# Patient Record
Sex: MALE | Race: WHITE | Employment: OTHER | URBAN - METROPOLITAN AREA
[De-identification: names, ages, dates, MRNs, and addresses within clinical notes are randomized per-mention and may not be internally consistent; named-entity substitution may affect disease eponyms.]

---

## 2017-10-13 PROBLEM — K92.2 GI BLEED: Chronic | Status: ACTIVE | Noted: 2017-10-13

## 2017-10-13 PROBLEM — Z79.01 CHRONIC ANTICOAGULATION: Status: ACTIVE | Noted: 2017-10-13

## 2017-10-16 PROBLEM — K25.0 ACUTE GASTRIC ULCER WITH HEMORRHAGE: Status: ACTIVE | Noted: 2017-10-16

## 2017-11-01 ENCOUNTER — HOSPITAL ENCOUNTER (OUTPATIENT)
Dept: SURGERY | Age: 72
Discharge: HOME | End: 2017-11-01
Attending: SPECIALIST | Admitting: SPECIALIST

## 2017-11-01 VITALS
HEART RATE: 71 BPM | DIASTOLIC BLOOD PRESSURE: 54 MMHG | WEIGHT: 159 LBS | TEMPERATURE: 97.1 F | HEIGHT: 70 IN | RESPIRATION RATE: 16 BRPM | SYSTOLIC BLOOD PRESSURE: 106 MMHG | OXYGEN SATURATION: 95 % | BODY MASS INDEX: 22.76 KG/M2

## 2017-11-01 RX ORDER — SODIUM CHLORIDE, SODIUM LACTATE, POTASSIUM CHLORIDE, CALCIUM CHLORIDE 600; 310; 30; 20 MG/100ML; MG/100ML; MG/100ML; MG/100ML
INJECTION, SOLUTION INTRAVENOUS CONTINUOUS
Status: DISCONTINUED | OUTPATIENT
Start: 2017-11-01 | End: 2017-11-02 | Stop reason: HOSPADM

## 2017-11-01 RX ADMIN — SODIUM CHLORIDE, SODIUM LACTATE, POTASSIUM CHLORIDE, CALCIUM CHLORIDE: 600; 310; 30; 20 INJECTION, SOLUTION INTRAVENOUS at 11:59

## 2017-11-01 ASSESSMENT — PAIN SCALES - GENERAL
PAINLEVEL_OUTOF10: 0
PAINLEVEL_OUTOF10: 0

## 2017-11-01 ASSESSMENT — PAIN - FUNCTIONAL ASSESSMENT: PAIN_FUNCTIONAL_ASSESSMENT: 0-10

## 2017-11-01 NOTE — PROGRESS NOTES
1326 denies pain or nausea. Head of bed up. Dr Ciarra Soriano provided update at bedside, call light in reach  1332 juice provided  [phone] denies needs  [phone] discharge instructions reviewed.  Verbalized understanding  1350 ambulated to bathroom   discharged to car

## 2017-11-01 NOTE — BRIEF OP NOTE
BRIEF EGD REPORT:    Impression:    1) no fresh or old blood and no stigmata of recent bleeding   2) small hiatal hernia with possible short-segment Zimmer's- biopsied   3) otherwise normal          Suggest:   1) continue current med        The complete operative report (including photos) is available in the following locations:   1) soft chart now   2) report will also be scanned and can then be found by going to \"chart review\" then \"notes\" then \"op report\" - \"scanning HPF\" or by going to \"chart review\" then \"media\" then \"op report\". For review of photos, may need to go to page 2.

## 2017-11-01 NOTE — ANESTHESIA PRE-OP
Preanesthesia Chart Xotwmp77/01/17  11:51 AM    Name:  Nikita Pedersen  MRN:  [id number]  :  1945   Age:  67 y.o. Planned procedure: -EGD      Surgeon:   Beth Saravia     Past Medical History:   Diagnosis Date    CAD (coronary artery disease)     History of blood clots     Hyperlipidemia     Hypertension     PAD (peripheral artery disease) (HCC)        Past Surgical History:   Procedure Laterality Date    APPENDECTOMY      ENDOSCOPY, COLON, DIAGNOSTIC      2 weeks ago    FEMORAL BYPASS Bilateral     aortal-femoral bypass surgery     LAPAROSCOPIC APPENDECTOMY  10/26/2016    and primary closure umbilical hernia        Social History     Social History    Marital status:      Spouse name: N/A    Number of children: N/A    Years of education: N/A     Occupational History    Not on file. Social History Main Topics    Smoking status: Current Every Day Smoker     Types: Cigars, Pipe    Smokeless tobacco: Never Used    Alcohol use Yes      Comment: occasionally     Drug use: No    Sexual activity: Not on file     Other Topics Concern    Not on file     Social History Narrative    No narrative on file        Present on Admission:  **None**       Allergies   Allergen Reactions    Azithromycin Diarrhea and Nausea And Vomiting     Diarrhea and coffee ground vomiting. Not in a hospital admission.       lactated ringers infusion Continuous     Most Recent Results:  /64   Pulse 71   Temp 97.1 °F (36.2 °C) (Temporal)   Resp 16   Ht 5' 10\" (1.778 m)   Wt 159 lb (72.1 kg)   SpO2 98%   BMI 22.81 kg/m²      Lab Results   Component Value Date/Time    WBC 8.1 10/31/2017 02:15 PM    HGB 11.8 (L) 10/31/2017 02:15 PM    HCT 36.5 (L) 10/31/2017 02:15 PM     (H) 10/31/2017 02:15 PM     10/31/2017 02:15 PM    K 4.6 10/31/2017 02:15 PM     10/31/2017 02:15 PM    CO2 27 10/31/2017 02:15 PM    BUN 22 10/31/2017 02:15 PM    CREATININE 1.4 (H) 10/31/2017 02:15 PM GLUCOSE 57 (L) 10/31/2017 02:15 PM    PROTIME 20.6 (H) 10/31/2017 02:15 PM    INR 1.78 10/31/2017 02:15 PM    APTT 29.1 10/31/2017 02:15 PM     Anesthesiology focused physical examination:  MP2  RRR without murmur  Lungs clear    Assessment/Plan:  NPO status confirmed  Not on beta-blockers  Plan MAC/TIVA    PS 3    Anesthesia procedures discussed - all questions answered.   Information/plan discussed with CRNA involved in the anesthesia care team.    Archie Jamil MD

## 2017-11-01 NOTE — ANESTHESIA POST-OP
Anesthesia Post-op Note    Patient: Catracho Ryan  MRN: [id number]  YOB: 1945  Date of evaluation: 11/1/2017  Time:  1:24 PM     Procedure(s) Performed: EGD with Bx    Last Vitals: /64   Pulse 71   Temp 97.1 °F (36.2 °C) (Temporal)   Resp 16   Ht 5' 10\" (1.778 m)   Wt 159 lb (72.1 kg)   SpO2 98%   BMI 22.81 kg/m²     Kami Phase I: Kami Score: 10    Kami Phase II:      Anesthesia Post Evaluation    Final anesthesia type: general  Patient location during evaluation: procedure area  Patient participation: complete - patient participated  Level of consciousness: awake and alert  Airway patency: patent  Nausea & Vomiting: no nausea and no vomiting  Complications: no  Cardiovascular status: hemodynamically stable  Respiratory status: acceptable  Hydration status: euvolemic        Gordon Padilla CRNA  1:24 PM

## 2018-10-17 ENCOUNTER — ANESTHESIA EVENT (OUTPATIENT)
Dept: OPERATING ROOM | Age: 73
End: 2018-10-17
Payer: MEDICARE

## 2018-10-17 ASSESSMENT — LIFESTYLE VARIABLES: SMOKING_STATUS: 1

## 2018-10-17 NOTE — ANESTHESIA PRE PROCEDURE
Units by mouth daily      magnesium oxide (MAG-OX) 400 MG tablet Take 400 mg by mouth daily      Multiple Vitamins-Minerals (CENTRUM SILVER PO) Take 1 tablet by mouth daily         Allergies: Allergies   Allergen Reactions    Azithromycin Diarrhea and Nausea And Vomiting     Diarrhea and coffee ground vomiting.         Problem List:    Patient Active Problem List   Diagnosis Code    Acute appendicitis with localized peritonitis K35.30    GI bleed K92.2    Chronic anticoagulation Z79.01    Acute gastric ulcer with hemorrhage K25.0       Past Medical History:        Diagnosis Date    CAD (coronary artery disease)     History of blood clots 1995    Hyperlipidemia     Hypertension     PAD (peripheral artery disease) ([facility] [address]        Past Surgical History:        Procedure Laterality Date    APPENDECTOMY      ENDOSCOPY, COLON, DIAGNOSTIC      2 weeks ago    ENDOSCOPY, COLON, DIAGNOSTIC  11/01/2017    egd w/ biopsy, healing duodenal ulcers w/o recurrent bleeding possible barretts, small hiatal hernia    FEMORAL BYPASS Bilateral 1995    aortal-femoral bypass surgery     LAPAROSCOPIC APPENDECTOMY  10/26/2016    and primary closure umbilical hernia       Social History:    Social History   Substance Use Topics    Smoking status: Current Every Day Smoker     Types: Cigars, Pipe    Smokeless tobacco: Never Used    Alcohol use Yes      Comment: occasionally                                 Ready to quit: Not Answered  Counseling given: Not Answered      Vital Signs (Current):   Vitals:    10/16/18 1650   Weight: 170 lb (77.1 kg)   Height: 5' 10\" (1.778 m)                                              BP Readings from Last 3 Encounters:   11/01/17 (!) 106/54   10/31/17 112/61   10/16/17 (!) 183/65       NPO Status:                                                                                 BMI:   Wt Readings from Last 3 Encounters:   11/01/17 159 lb (72.1 kg)   10/31/17 150 lb (68 kg)   10/16/17 172 lb 11.2 oz (78.3 kg)     Body mass index is 24.39 kg/m². CBC:   Lab Results   Component Value Date    WBC 8.1 10/31/2017    RBC 3.55 10/31/2017    HGB 11.8 10/31/2017    HCT 36.5 10/31/2017    .8 10/31/2017    RDW 12.9 10/31/2017     10/31/2017       CMP:   Lab Results   Component Value Date     10/31/2017    K 4.6 10/31/2017     10/31/2017    CO2 27 10/31/2017    BUN 22 10/31/2017    CREATININE 1.4 10/31/2017    GFRAA >60 10/31/2017    LABGLOM 50 10/31/2017    GLUCOSE 57 10/31/2017    PROT 7.2 10/31/2017    CALCIUM 9.5 10/31/2017    BILITOT 0.2 10/31/2017    ALKPHOS 58 10/31/2017    AST 14 10/31/2017    ALT 8 10/31/2017       POC Tests: No results for input(s): POCGLU, POCNA, POCK, POCCL, POCBUN, POCHEMO, POCHCT in the last 72 hours. Coags:   Lab Results   Component Value Date    PROTIME 20.6 10/31/2017    INR 1.78 10/31/2017    APTT 29.1 10/31/2017       HCG (If Applicable): No results found for: PREGTESTUR, PREGSERUM, HCG, HCGQUANT     ABGs: No results found for: PHART, PO2ART, UJP8EPI, OOY1IZZ, BEART, Q9YIXCNQ     Type & Screen (If Applicable):  No results found for: LABABO, [address]    Anesthesia Evaluation  Patient summary reviewed  Airway: Mallampati: II     Neck ROM: full   Dental:    (+) upper dentures and lower dentures      Pulmonary:normal exam    (+) current smoker          Patient did not smoke on day of surgery. Cardiovascular:    (+) hypertension:, CAD:, hyperlipidemia                  Neuro/Psych:               GI/Hepatic/Renal:   (+) PUD,           Endo/Other:    (+) blood dyscrasia: anticoagulation therapy:., .                 Abdominal:           Vascular:   + PVD, aortic or cerebral, . Anesthesia Plan      MAC     ASA 3     ( Pre Anesthesia Assessment complete. Chart reviewed on 10/17/2018)  Induction: intravenous. Anesthetic plan and risks discussed with patient. Plan discussed with CRNA.     Attending anesthesiologist reviewed and agrees with Pre Eval content            GERRI Schaffer - CRNA   10/17/2018

## 2018-10-18 ENCOUNTER — ANESTHESIA (OUTPATIENT)
Dept: OPERATING ROOM | Age: 73
End: 2018-10-18
Payer: MEDICARE

## 2018-10-18 ENCOUNTER — HOSPITAL ENCOUNTER (OUTPATIENT)
Age: 73
Setting detail: OUTPATIENT SURGERY
Discharge: HOME | End: 2018-10-18
Attending: SPECIALIST | Admitting: SPECIALIST
Payer: MEDICARE

## 2018-10-18 VITALS
RESPIRATION RATE: 16 BRPM | SYSTOLIC BLOOD PRESSURE: 123 MMHG | BODY MASS INDEX: 24.94 KG/M2 | DIASTOLIC BLOOD PRESSURE: 72 MMHG | HEIGHT: 70 IN | OXYGEN SATURATION: 94 % | HEART RATE: 72 BPM | WEIGHT: 174.2 LBS | TEMPERATURE: 97.3 F

## 2018-10-18 VITALS — SYSTOLIC BLOOD PRESSURE: 107 MMHG | DIASTOLIC BLOOD PRESSURE: 59 MMHG | OXYGEN SATURATION: 100 %

## 2018-10-18 PROCEDURE — 2709999900 HC NON-CHARGEABLE SUPPLY: Performed by: SPECIALIST

## 2018-10-18 PROCEDURE — 7100000010 HC PHASE II RECOVERY - FIRST 15 MIN: Performed by: SPECIALIST

## 2018-10-18 PROCEDURE — 3700000000 HC ANESTHESIA ATTENDED CARE: Performed by: SPECIALIST

## 2018-10-18 PROCEDURE — 6360000002 HC RX W HCPCS: Performed by: NURSE ANESTHETIST, CERTIFIED REGISTERED

## 2018-10-18 PROCEDURE — 2580000003 HC RX 258: Performed by: SPECIALIST

## 2018-10-18 PROCEDURE — 88305 TISSUE EXAM BY PATHOLOGIST: CPT

## 2018-10-18 PROCEDURE — 3700000001 HC ADD 15 MINUTES (ANESTHESIA): Performed by: SPECIALIST

## 2018-10-18 PROCEDURE — 2500000003 HC RX 250 WO HCPCS: Performed by: NURSE ANESTHETIST, CERTIFIED REGISTERED

## 2018-10-18 PROCEDURE — 7100000011 HC PHASE II RECOVERY - ADDTL 15 MIN: Performed by: SPECIALIST

## 2018-10-18 PROCEDURE — 3609010300 HC COLONOSCOPY W/BIOPSY SINGLE/MULTIPLE: Performed by: SPECIALIST

## 2018-10-18 RX ORDER — SODIUM CHLORIDE, SODIUM LACTATE, POTASSIUM CHLORIDE, CALCIUM CHLORIDE 600; 310; 30; 20 MG/100ML; MG/100ML; MG/100ML; MG/100ML
INJECTION, SOLUTION INTRAVENOUS CONTINUOUS
Status: DISCONTINUED | OUTPATIENT
Start: 2018-10-18 | End: 2018-10-18 | Stop reason: HOSPADM

## 2018-10-18 RX ORDER — PROPOFOL 10 MG/ML
INJECTION, EMULSION INTRAVENOUS PRN
Status: DISCONTINUED | OUTPATIENT
Start: 2018-10-18 | End: 2018-10-18 | Stop reason: SDUPTHER

## 2018-10-18 RX ORDER — LIDOCAINE HYDROCHLORIDE 20 MG/ML
INJECTION, SOLUTION EPIDURAL; INFILTRATION; INTRACAUDAL; PERINEURAL PRN
Status: DISCONTINUED | OUTPATIENT
Start: 2018-10-18 | End: 2018-10-18 | Stop reason: SDUPTHER

## 2018-10-18 RX ADMIN — SODIUM CHLORIDE, POTASSIUM CHLORIDE, SODIUM LACTATE AND CALCIUM CHLORIDE: 600; 310; 30; 20 INJECTION, SOLUTION INTRAVENOUS at 10:06

## 2018-10-18 RX ADMIN — LIDOCAINE HYDROCHLORIDE 100 MG: 20 INJECTION, SOLUTION EPIDURAL; INFILTRATION; INTRACAUDAL; PERINEURAL at 11:27

## 2018-10-18 RX ADMIN — PROPOFOL 60 MG: 10 INJECTION, EMULSION INTRAVENOUS at 11:31

## 2018-10-18 RX ADMIN — PROPOFOL 60 MG: 10 INJECTION, EMULSION INTRAVENOUS at 11:33

## 2018-10-18 RX ADMIN — PROPOFOL 30 MG: 10 INJECTION, EMULSION INTRAVENOUS at 11:40

## 2018-10-18 RX ADMIN — PROPOFOL 70 MG: 10 INJECTION, EMULSION INTRAVENOUS at 11:27

## 2018-10-18 ASSESSMENT — PAIN - FUNCTIONAL ASSESSMENT: PAIN_FUNCTIONAL_ASSESSMENT: 0-10

## 2018-10-18 ASSESSMENT — PAIN SCALES - GENERAL
PAINLEVEL_OUTOF10: 0

## 2018-10-18 NOTE — ANESTHESIA POSTPROCEDURE EVALUATION
Department of Anesthesiology  Postprocedure Note    Patient: Paolo Moore  MRN: [id number]  YOB: 1945  Date of evaluation: 10/18/2018  Time:  12:05 PM     Procedure Summary     Date:  10/18/18 Room / Location:  [address] [facility] OR    Anesthesia Start:  0954 Anesthesia Stop:  8931    Procedure:  COLONOSCOPY WITH BIOPSY (N/A ) Diagnosis:  (HX Polyps)    Surgeon:  Fatuma Lundy MD Responsible Provider:  Andi Montano MD    Anesthesia Type:  MAC ASA Status:  3          Anesthesia Type: MAC    Kami Phase I: Kami Score: 10    Kami Phase II:      Last vitals: Reviewed and per EMR flowsheets.        Anesthesia Post Evaluation    Patient location during evaluation: PACU  Patient participation: complete - patient participated  Level of consciousness: awake  Airway patency: patent  Nausea & Vomiting: no vomiting and no nausea  Complications: no  Cardiovascular status: blood pressure returned to baseline  Respiratory status: acceptable  Hydration status: euvolemic

## 2018-10-18 NOTE — ANESTHESIA POSTPROCEDURE EVALUATION
Department of Anesthesiology  Postprocedure Note    Patient: Livia Grady  MRN: [id number]  YOB: 1945  Date of evaluation: 10/18/2018  Time:  11:43 AM     Procedure Summary     Date:  10/18/18 Room / Location:  [place] [address] / [facility]    Anesthesia Start:  1126 Anesthesia Stop:      Procedure:  COLONOSCOPY WITH BIOPSY (N/A ) Diagnosis:  (HX Polyps)    Surgeon:  Preston Yanes MD Responsible Provider:  Bita Sevilla MD    Anesthesia Type:  MAC ASA Status:  3          Anesthesia Type: MAC    Kami Phase I: Kami Score: 10    Kami Phase II:      Last vitals: Reviewed and per EMR flowsheets.        Anesthesia Post Evaluation    Patient location during evaluation: bedside  Patient participation: complete - patient participated  Level of consciousness: awake  Pain score: 0  Airway patency: patent  Nausea & Vomiting: no nausea and no vomiting  Complications: no  Cardiovascular status: blood pressure returned to baseline  Respiratory status: spontaneous ventilation and acceptable  Hydration status: euvolemic

## 2018-10-29 ENCOUNTER — TELEPHONE (OUTPATIENT)
Dept: CARDIOLOGY CLINIC | Age: 73
End: 2018-10-29

## 2018-10-29 ENCOUNTER — INITIAL CONSULT (OUTPATIENT)
Dept: CARDIOLOGY CLINIC | Age: 73
End: 2018-10-29
Payer: MEDICARE

## 2018-10-29 VITALS
DIASTOLIC BLOOD PRESSURE: 60 MMHG | HEIGHT: 70 IN | WEIGHT: 178 LBS | SYSTOLIC BLOOD PRESSURE: 94 MMHG | BODY MASS INDEX: 25.48 KG/M2 | HEART RATE: 60 BPM

## 2018-10-29 DIAGNOSIS — Z76.89 ENCOUNTER TO ESTABLISH CARE WITH NEW DOCTOR: Primary | ICD-10-CM

## 2018-10-29 PROCEDURE — 93000 ELECTROCARDIOGRAM COMPLETE: CPT | Performed by: INTERNAL MEDICINE

## 2018-10-29 PROCEDURE — 1101F PT FALLS ASSESS-DOCD LE1/YR: CPT | Performed by: INTERNAL MEDICINE

## 2018-10-29 PROCEDURE — G8419 CALC BMI OUT NRM PARAM NOF/U: HCPCS | Performed by: INTERNAL MEDICINE

## 2018-10-29 PROCEDURE — 99203 OFFICE O/P NEW LOW 30 MIN: CPT | Performed by: INTERNAL MEDICINE

## 2018-10-29 PROCEDURE — 3017F COLORECTAL CA SCREEN DOC REV: CPT | Performed by: INTERNAL MEDICINE

## 2018-10-29 PROCEDURE — G8484 FLU IMMUNIZE NO ADMIN: HCPCS | Performed by: INTERNAL MEDICINE

## 2018-10-29 PROCEDURE — G8427 DOCREV CUR MEDS BY ELIG CLIN: HCPCS | Performed by: INTERNAL MEDICINE

## 2018-10-29 RX ORDER — INDOMETHACIN 25 MG/1
25 CAPSULE ORAL DAILY PRN
COMMUNITY
End: 2019-11-05

## 2018-10-29 RX ORDER — CHOLECALCIFEROL (VITAMIN D3) 125 MCG
1 CAPSULE ORAL EVERY MORNING
COMMUNITY

## 2018-10-29 NOTE — PROGRESS NOTES
constipation, diarrhea or heartburn  · Genitourinary: No dysuria, trouble voiding, or hematuria  · Musculoskeletal:  No gait disturbance, weakness or joint complaints  · Integumentary: No rash or pruritis  · Neurological: No TIA or stroke symptoms  · Psychiatric: No anxiety or depression  · Endocrine: No malaise, fatigue or temperature intolerance  · Hematologic/Lymphatic: No bleeding problems, blood clots or swollen lymph nodes  · Allergic/Immunologic: No nasal congestion or hives  All systems negative except as marked. ·   ·      Physical Examination:    Vitals:    10/29/18 1058   BP: 94/60   Pulse: 60    rr 14  afebrile  Wt Readings from Last 3 Encounters:   10/29/18 178 lb (80.7 kg)   10/18/18 174 lb 3.2 oz (79 kg)   11/01/17 159 lb (72.1 kg)     Body mass index is 25.54 kg/m². General Appearance:  No distress, conversant    Constitutional:  Well developed, Well nourished, No acute distress, Non-toxic appearance. HENT:  Normocephalic, Atraumatic, Bilateral external ears normal, Oropharynx moist, No oral exudates, Nose normal. Neck- Normal range of motion, No tenderness, Supple, No stridor,no apical-carotid delay, no carotid bruit  Eyes:  PERRL, EOMI, Conjunctiva normal, No discharge. Respiratory:  Normal breath sounds, No respiratory distress, No wheezing, No chest tenderness. ,no use of accessory muscles, diaphragm movement is normal  Cardiovascular: (PMI) apex non displaced,no lifts no thrills, no s3,no s4, Normal heart rate, Normal rhythm, No murmurs, No rubs, No gallops. Carotid arteries pulse and amplitude are normal no bruit, no abdominal bruit noted ( normal abdominal aorta ausculation), femoral arteries pulse and amplitude are normal no bruit, pedal pulses are normal  GI:  Bowel sounds normal, Soft, No tenderness, No masses, No pulsatile masses, no hepatosplenomegally, no bruits  : External genitalia appear normal, No masses or lesions. No discharge. No CVA tenderness.    Musculoskeletal:

## 2019-05-02 ENCOUNTER — OFFICE VISIT (OUTPATIENT)
Dept: CARDIOLOGY CLINIC | Age: 74
End: 2019-05-02
Payer: MEDICARE

## 2019-05-02 VITALS
DIASTOLIC BLOOD PRESSURE: 70 MMHG | SYSTOLIC BLOOD PRESSURE: 118 MMHG | HEART RATE: 80 BPM | BODY MASS INDEX: 26.34 KG/M2 | HEIGHT: 70 IN | WEIGHT: 184 LBS

## 2019-05-02 DIAGNOSIS — I25.10 CORONARY ARTERY DISEASE INVOLVING NATIVE CORONARY ARTERY OF NATIVE HEART WITHOUT ANGINA PECTORIS: Primary | ICD-10-CM

## 2019-05-02 PROCEDURE — G8427 DOCREV CUR MEDS BY ELIG CLIN: HCPCS | Performed by: INTERNAL MEDICINE

## 2019-05-02 PROCEDURE — 3017F COLORECTAL CA SCREEN DOC REV: CPT | Performed by: INTERNAL MEDICINE

## 2019-05-02 PROCEDURE — G8598 ASA/ANTIPLAT THER USED: HCPCS | Performed by: INTERNAL MEDICINE

## 2019-05-02 PROCEDURE — G8419 CALC BMI OUT NRM PARAM NOF/U: HCPCS | Performed by: INTERNAL MEDICINE

## 2019-05-02 PROCEDURE — 4040F PNEUMOC VAC/ADMIN/RCVD: CPT | Performed by: INTERNAL MEDICINE

## 2019-05-02 PROCEDURE — 1123F ACP DISCUSS/DSCN MKR DOCD: CPT | Performed by: INTERNAL MEDICINE

## 2019-05-02 PROCEDURE — 1036F TOBACCO NON-USER: CPT | Performed by: INTERNAL MEDICINE

## 2019-05-02 PROCEDURE — 99214 OFFICE O/P EST MOD 30 MIN: CPT | Performed by: INTERNAL MEDICINE

## 2019-05-02 NOTE — PROGRESS NOTES
Marissa Jefferson MD        OFFICE  FOLLOWUP NOTE    Chief complaints: patient is here for management of HTN, dyslipidemia, PAD ( aorta-fempop bypass)      Subjective: patient feels better, no chest pain, no shortness of breath, no dizziness, no palpitations    HPI [facility] is a 68 y. o.year old who  has a past medical history of CAD (coronary artery disease), H/O cardiac catheterization, History of blood clots, Hyperlipidemia, Hypertension, and PAD (peripheral artery disease) ([address] and presents for management of HTN, dyslipidemia, PAD ( aorta-fempop bypass) which are well controlled, ast time his lisinopril/hctz was stopped due to low blood pressure, he feels better now. Current Outpatient Medications   Medication Sig Dispense Refill    Cholecalciferol (VITAMIN D3) 2000 units TABS Take 1 tablet by mouth daily      indomethacin (INDOCIN) 25 MG capsule Take 25 mg by mouth daily as needed for Pain      Magnesium 400 MG CAPS Take 1 capsule by mouth daily      diclofenac sodium (VOLTAREN) 1 % GEL Apply 2 g topically 2 times daily      pantoprazole (PROTONIX) 40 MG tablet Take 1 tablet by mouth every morning (before breakfast) 30 tablet 3    pravastatin (PRAVACHOL) 20 MG tablet Take 20 mg by mouth nightly       cilostazol (PLETAL) 50 MG tablet Take 50 mg by mouth 2 times daily      Aspirin Buf,[id number]-[id number], (BUFFERED ASPIRIN) 325 MG TABS Take by mouth      fenofibrate (TRICOR) 54 MG tablet Take 54 mg by mouth daily s [facility] pharmacy: Please dispense generic fenofibrate unless prescriber denote      ascorbic acid (GNP VITAMIN C) 500 MG tablet Take 500 mg by mouth daily      metoprolol succinate (TOPROL XL) 50 MG extended release tablet Take 50 mg by mouth 2 times daily      nitroGLYCERIN (NITROSTAT) 0.4 MG SL tablet Place 0.4 mg under the tongue every 5 minutes as needed for Chest pain Dissolve 1 tab under tongue at first sign of chest pain.  May repeat every 5 minutes until relief is obtained. If pain persists after taking 3 tabs in a 15-minute period, or the pain is different than is typically experienced, call 9-1-1 immediately.  vitamin E 400 UNIT capsule Take 400 Units by mouth daily      magnesium oxide (MAG-OX) 400 MG tablet Take 400 mg by mouth daily      Multiple Vitamins-Minerals (CENTRUM SILVER PO) Take 1 tablet by mouth daily      warfarin (COUMADIN) 1 MG tablet Take 1 tablet by mouth every other day for 10 days (Patient taking differently: Take 4 mg by mouth daily ) 30 tablet 0     No current facility-administered medications for this visit.       Allergies: Azithromycin  Past Medical History:   Diagnosis Date    CAD (coronary artery disease)     H/O cardiac catheterization 2013 or 2014    No stenting    History of blood clots 1995    Hyperlipidemia     Hypertension     PAD (peripheral artery disease) (formerly [facility])      Past Surgical History:   Procedure Laterality Date    APPENDECTOMY      COLONOSCOPY N/A 10/18/2018    COLONOSCOPY WITH BIOPSY performed by Leana Manzo MD at [address] [place] [address] Rd, COLON, DIAGNOSTIC      2 weeks ago    ENDOSCOPY, COLON, DIAGNOSTIC  11/01/2017    egd w/ biopsy, healing duodenal ulcers w/o recurrent bleeding possible barretts, small hiatal hernia    FEMORAL BYPASS Bilateral 1995    aortal-femoral bypass surgery     LAPAROSCOPIC APPENDECTOMY  10/26/2016    and primary closure umbilical hernia     Family History   Problem Relation Age of Onset    Cancer Mother     Heart Attack Father     Hypertension Father     Crohn's Disease Sister     Deep Vein Thrombosis Sister      Social History     Tobacco Use    Smoking status: Former Smoker     Types: Cigars, Pipe    Smokeless tobacco: Never Used    Tobacco comment: patient states he vapes    Substance Use Topics    Alcohol use: Yes     Comment: occasionally       [unfilled]  Review of Systems:   · Constitutional: No Fever or Weight Loss   · Eyes: No Decreased Vision  · ENT: No Headaches, Hearing Loss or Vertigo  · Cardiovascular: No chest pain, dyspnea on exertion, palpitations or loss of consciousness  · Respiratory: No cough or wheezing    · Gastrointestinal: No abdominal pain, appetite loss, blood in stools, constipation, diarrhea or heartburn  · Genitourinary: No dysuria, trouble voiding, or hematuria  · Musculoskeletal:  No gait disturbance, weakness or joint complaints  · Integumentary: No rash or pruritis  · Neurological: No TIA or stroke symptoms  · Psychiatric: No anxiety or depression  · Endocrine: No malaise, fatigue or temperature intolerance  · Hematologic/Lymphatic: No bleeding problems, blood clots or swollen lymph nodes  · Allergic/Immunologic: No nasal congestion or hives  All systems negative except as marked. Objective:  /70   Pulse 80   Ht 5' 10\" (1.778 m)   Wt 184 lb (83.5 kg)   BMI 26.40 kg/m²   Wt Readings from Last 3 Encounters:   05/02/19 184 lb (83.5 kg)   10/29/18 178 lb (80.7 kg)   10/18/18 174 lb 3.2 oz (79 kg)     Body mass index is 26.4 kg/m². GENERAL - Alert, oriented, pleasant, in no apparent distress,normal grooming  HEENT - pupils are reactive to light and accomodation, cornea intact, conjunctive normal color, ears are normal in exam,throat exam in normal, teeth, gum and palate are normal, oral mucosa is normal without any notation of pallor or cyanosis  Neck - Supple. No jugular venous distention noted. No carotid bruits, no apical -carotid delay  Respiratory:  Normal breath sounds, No respiratory distress, No wheezing, No chest tenderness. ,no use of accessory muscles, diaphragm movement is normal  Cardiovascular: (PMI) apex non displaced,no lifts no thrills, no s3,no s4, Normal heart rate, Normal rhythm, No murmurs, No rubs, No gallops.  Carotid arteries pulse and amplitude are normal no bruit, no abdominal bruit noted ( normal abdominal aorta ausculation), femoral arteries pulse and amplitude are normal no bruit, pedal pulses are normal  Femoral pulses have normal amplitude, no bruits   Extremities - No cyanosis, clubbing, or significant edema, no varicose veins    Abdomen - No masses, tenderness, or organomegaly, no hepato-splenomegally, no bruits  Musculoskeletal - No significant edema, no kyphosis or scoliosis, no deformity in any extremity noted, muscle strength and tone are normal  Skin: no ulcer,no scar,no stasis dermatitis   Neurologic - alert oriented times 3,Cranial nerves II through XII are grossly intact. There were no gross focal neurologic abnormalities. All sensory and motor nerves examined and were normal  Psychiatric: normal mood and affect    No results found for: CKTOTAL, CKMB, CKMBINDEX, TROPONINI  BNP:  No results found for: BNP  PT/INR:  No results found for: PTINR  No results found for: LABA1C  No results found for: CHOL, TRIG, HDL, LDLCALC, LDLDIRECT  Lab Results   Component Value Date    ALT 8 (L) 10/31/2017    AST 14 (L) 10/31/2017     TSH:  No results found for: TSH    Impression:  Astrid Mckeon is a 68 y. o.year old who  has a past medical history of CAD (coronary artery disease), H/O cardiac catheterization, History of blood clots, Hyperlipidemia, Hypertension, and PAD (peripheral artery disease) ([facility] [address] and presents with     Plan:     1. HTN: better off hctz/lisinopril, continue lopressor,  2. PAD: patient had aorta- to femoral graft clot, will continue coumadin and pletal, arterial doppler ordered  3. Echo and stress test ordered AS HE CLAIMS TO HAVE HAVE MINI heart attacks and as per Dr. Suzy Meigs, HE HAd some blockage  4. dyslipidemia : continue statins and fenofibrates  5. Could not get records from PMD AS Dr. Hannah Cross office is closed    1. Health maintenance: exerise and diet  All labs, medications and tests reviewed, continue all other medications of all above medical condition listed as is.     [unfilled]

## 2019-05-17 ENCOUNTER — PROCEDURE VISIT (OUTPATIENT)
Dept: CARDIOLOGY CLINIC | Age: 74
End: 2019-05-17
Payer: MEDICARE

## 2019-05-17 DIAGNOSIS — I25.10 CORONARY ARTERY DISEASE INVOLVING NATIVE CORONARY ARTERY OF NATIVE HEART WITHOUT ANGINA PECTORIS: ICD-10-CM

## 2019-05-17 PROCEDURE — 93922 UPR/L XTREMITY ART 2 LEVELS: CPT | Performed by: INTERNAL MEDICINE

## 2019-05-17 PROCEDURE — 93930 UPPER EXTREMITY STUDY: CPT | Performed by: INTERNAL MEDICINE

## 2019-05-20 ENCOUNTER — PROCEDURE VISIT (OUTPATIENT)
Dept: CARDIOLOGY CLINIC | Age: 74
End: 2019-05-20
Payer: MEDICARE

## 2019-05-20 DIAGNOSIS — I73.9 PAD (PERIPHERAL ARTERY DISEASE) (HCC): Primary | ICD-10-CM

## 2019-05-20 DIAGNOSIS — I25.10 CORONARY ARTERY DISEASE INVOLVING NATIVE CORONARY ARTERY OF NATIVE HEART WITHOUT ANGINA PECTORIS: ICD-10-CM

## 2019-05-20 DIAGNOSIS — I25.10 CORONARY ARTERY DISEASE INVOLVING NATIVE CORONARY ARTERY OF NATIVE HEART WITHOUT ANGINA PECTORIS: Primary | ICD-10-CM

## 2019-05-20 LAB
LV EF: 53 %
LV EF: 64 %
LVEF MODALITY: NORMAL
LVEF MODALITY: NORMAL

## 2019-05-20 PROCEDURE — A9500 TC99M SESTAMIBI: HCPCS | Performed by: INTERNAL MEDICINE

## 2019-05-20 PROCEDURE — 78452 HT MUSCLE IMAGE SPECT MULT: CPT | Performed by: INTERNAL MEDICINE

## 2019-05-20 PROCEDURE — 93306 TTE W/DOPPLER COMPLETE: CPT | Performed by: INTERNAL MEDICINE

## 2019-05-20 PROCEDURE — 93015 CV STRESS TEST SUPVJ I&R: CPT | Performed by: INTERNAL MEDICINE

## 2019-05-22 ENCOUNTER — TELEPHONE (OUTPATIENT)
Dept: CARDIOLOGY CLINIC | Age: 74
End: 2019-05-22

## 2019-05-22 NOTE — TELEPHONE ENCOUNTER
LM on  for patient to call me back for testing results. Supervising physician Dr. Hannah Phipps . normal stress test, normal LVEF, Normal    tracer uptake in all myocardial segment during rest and stress. Decreased    uptake inferiorly due to diaphragmatic artifact.        Recommendation    OPTIMIZE MEDICATIONS,    Recommendation: Routine follow-up. Normal bilateral ABIs.  Right distal SFA has 20-49% stenosis.    Diffuse plaquing noted throughout.        Recommendations        optimize medications       Left ventricular systolic function is normal with an ejection fraction of   50-55%.   Mild concentric left ventricular hypertrophy.   Sclerotic, but non-stenotic aortic valve.   Mild thickening of anterior leaflet of mitral valve.   Right ventricular systolic pressure of 41 mmHg consistent with mild   pulmonary hypertension.   No evidence of pericardial effusion.

## 2019-08-19 ENCOUNTER — OFFICE VISIT (OUTPATIENT)
Dept: CARDIOLOGY CLINIC | Age: 74
End: 2019-08-19
Payer: MEDICARE

## 2019-08-19 VITALS
HEART RATE: 76 BPM | HEIGHT: 70 IN | RESPIRATION RATE: 16 BRPM | WEIGHT: 181 LBS | BODY MASS INDEX: 25.91 KG/M2 | DIASTOLIC BLOOD PRESSURE: 70 MMHG | SYSTOLIC BLOOD PRESSURE: 138 MMHG

## 2019-08-19 DIAGNOSIS — I10 ESSENTIAL HYPERTENSION: Primary | ICD-10-CM

## 2019-08-19 DIAGNOSIS — R53.83 TIREDNESS: ICD-10-CM

## 2019-08-19 PROCEDURE — 99214 OFFICE O/P EST MOD 30 MIN: CPT | Performed by: INTERNAL MEDICINE

## 2019-08-19 PROCEDURE — 3017F COLORECTAL CA SCREEN DOC REV: CPT | Performed by: INTERNAL MEDICINE

## 2019-08-19 PROCEDURE — G8419 CALC BMI OUT NRM PARAM NOF/U: HCPCS | Performed by: INTERNAL MEDICINE

## 2019-08-19 PROCEDURE — G8598 ASA/ANTIPLAT THER USED: HCPCS | Performed by: INTERNAL MEDICINE

## 2019-08-19 PROCEDURE — 1036F TOBACCO NON-USER: CPT | Performed by: INTERNAL MEDICINE

## 2019-08-19 PROCEDURE — 4040F PNEUMOC VAC/ADMIN/RCVD: CPT | Performed by: INTERNAL MEDICINE

## 2019-08-19 PROCEDURE — 1123F ACP DISCUSS/DSCN MKR DOCD: CPT | Performed by: INTERNAL MEDICINE

## 2019-08-19 PROCEDURE — G8427 DOCREV CUR MEDS BY ELIG CLIN: HCPCS | Performed by: INTERNAL MEDICINE

## 2019-08-19 RX ORDER — NITROGLYCERIN 0.4 MG/1
0.4 TABLET SUBLINGUAL EVERY 5 MIN PRN
Qty: 25 TABLET | Refills: 3 | Status: SHIPPED | OUTPATIENT
Start: 2019-08-19 | End: 2021-04-12 | Stop reason: SDUPTHER

## 2019-08-19 NOTE — PROGRESS NOTES
with     Plan:  1. HTN: better off hctz/lisinopril, continue lopressor,  2. PAD: patient had aorta- to femoral graft clot, will continue coumadin and pletal, arterial doppler ordered  3. Echo and stress test ordered showed possible diaphragmatic artifact,AS HE CLAIMS TO HAVE HAVE MINI heart attacks and as per Dr. Leela Piedra, HE HAd some blockage  4. Tiredness \" check tsh  5. Sleep apnea: recommend to follow up with pulmonary to adjust the CPAP, HE had it initially from [place]  6. dyslipidemia : continue statins and fenofibrates  7. Could not get records from PMD AS Dr. Jorge Busby office is closed  1.  Health maintenance: exerise and diet  All labs, medications and tests reviewed, continue all other medications of all above medical condition listed as is.     [unfilled]

## 2019-10-30 ENCOUNTER — HOSPITAL ENCOUNTER (OUTPATIENT)
Dept: CT IMAGING | Age: 74
Discharge: HOME | End: 2019-10-30
Payer: MEDICARE

## 2019-10-30 DIAGNOSIS — R31.21 ASYMPTOMATIC MICROSCOPIC HEMATURIA: ICD-10-CM

## 2019-10-30 LAB
GFR AFRICAN AMERICAN: >60 ML/MIN/1.73M2
GFR NON-AFRICAN AMERICAN: 59 ML/MIN/1.73M2
POC CREATININE: 1.2 MG/DL (ref 0.9–1.3)

## 2019-10-30 PROCEDURE — 74178 CT ABD&PLV WO CNTR FLWD CNTR: CPT

## 2019-10-30 PROCEDURE — 6360000004 HC RX CONTRAST MEDICATION: Performed by: UROLOGY

## 2019-10-30 PROCEDURE — 2580000003 HC RX 258: Performed by: UROLOGY

## 2019-10-30 RX ORDER — SODIUM CHLORIDE 0.9 % (FLUSH) 0.9 %
10 SYRINGE (ML) INJECTION ONCE
Status: COMPLETED | OUTPATIENT
Start: 2019-10-30 | End: 2019-10-30

## 2019-10-30 RX ORDER — 0.9 % SODIUM CHLORIDE 0.9 %
50 VIAL (ML) INJECTION ONCE
Status: COMPLETED | OUTPATIENT
Start: 2019-10-30 | End: 2019-10-30

## 2019-10-30 RX ADMIN — Medication 50 ML: at 11:19

## 2019-10-30 RX ADMIN — IOPAMIDOL 75 ML: 755 INJECTION, SOLUTION INTRAVENOUS at 11:19

## 2019-10-30 RX ADMIN — Medication 10 ML: at 11:19

## 2019-11-01 ENCOUNTER — TELEPHONE (OUTPATIENT)
Dept: CARDIOLOGY CLINIC | Age: 74
End: 2019-11-01

## 2019-11-02 ENCOUNTER — HOSPITAL ENCOUNTER (OUTPATIENT)
Age: 74
Setting detail: SPECIMEN
Discharge: HOME | End: 2019-11-02
Payer: MEDICARE

## 2019-11-02 PROCEDURE — 82274 ASSAY TEST FOR BLOOD FECAL: CPT

## 2019-11-06 ENCOUNTER — HOSPITAL ENCOUNTER (OUTPATIENT)
Age: 74
Discharge: HOME | End: 2019-11-06
Payer: MEDICARE

## 2019-11-06 LAB
BASOPHILS ABSOLUTE: 0.1 K/CU MM
BASOPHILS RELATIVE PERCENT: 0.7 % (ref 0–1)
DIFFERENTIAL TYPE: ABNORMAL
EOSINOPHILS ABSOLUTE: 0.5 K/CU MM
EOSINOPHILS RELATIVE PERCENT: 4.4 % (ref 0–3)
HCT VFR BLD CALC: 33.5 % (ref 42–52)
HEMOGLOBIN: 9.1 GM/DL (ref 13.5–18)
IMMATURE NEUTROPHIL %: 1.3 % (ref 0–0.43)
LYMPHOCYTES ABSOLUTE: 2 K/CU MM
LYMPHOCYTES RELATIVE PERCENT: 16.5 % (ref 24–44)
MCH RBC QN AUTO: 24.5 PG (ref 27–31)
MCHC RBC AUTO-ENTMCNC: 27.2 % (ref 32–36)
MCV RBC AUTO: 90.3 FL (ref 78–100)
MONOCYTES ABSOLUTE: 1 K/CU MM
MONOCYTES RELATIVE PERCENT: 8.5 % (ref 0–4)
NUCLEATED RBC %: 0.8 %
PDW BLD-RTO: 16.6 % (ref 11.7–14.9)
PLATELET # BLD: 667 K/CU MM (ref 140–440)
PMV BLD AUTO: 9.4 FL (ref 7.5–11.1)
RBC # BLD: 3.71 M/CU MM (ref 4.6–6.2)
SEGMENTED NEUTROPHILS ABSOLUTE COUNT: 8.3 K/CU MM
SEGMENTED NEUTROPHILS RELATIVE PERCENT: 68.6 % (ref 36–66)
TOTAL IMMATURE NEUTOROPHIL: 0.16 K/CU MM
TOTAL NUCLEATED RBC: 0.1 K/CU MM
WBC # BLD: 12.2 K/CU MM (ref 4–10.5)

## 2019-11-06 PROCEDURE — 85025 COMPLETE CBC W/AUTO DIFF WBC: CPT

## 2019-11-06 PROCEDURE — 36415 COLL VENOUS BLD VENIPUNCTURE: CPT

## 2019-11-07 LAB
OCCULT BLOOD, FECAL, IA: ABNORMAL
OCCULT BLOOD, FECAL, IA: POSITIVE

## 2019-11-19 ENCOUNTER — TELEPHONE (OUTPATIENT)
Dept: GASTROENTEROLOGY | Age: 74
End: 2019-11-19

## 2019-11-19 ENCOUNTER — HOSPITAL ENCOUNTER (OUTPATIENT)
Age: 74
Discharge: HOME | End: 2019-11-19
Payer: MEDICARE

## 2019-11-19 DIAGNOSIS — K92.2 GASTROINTESTINAL HEMORRHAGE, UNSPECIFIED GASTROINTESTINAL HEMORRHAGE TYPE: Primary | ICD-10-CM

## 2019-11-19 LAB
BASOPHILS ABSOLUTE: 0.1 K/CU MM
BASOPHILS RELATIVE PERCENT: 0.8 % (ref 0–1)
DIFFERENTIAL TYPE: ABNORMAL
EOSINOPHILS ABSOLUTE: 0.3 K/CU MM
EOSINOPHILS RELATIVE PERCENT: 3.6 % (ref 0–3)
HCT VFR BLD CALC: 32.2 % (ref 42–52)
HEMOGLOBIN: 8.5 GM/DL (ref 13.5–18)
IMMATURE NEUTROPHIL %: 0.4 % (ref 0–0.43)
LYMPHOCYTES ABSOLUTE: 1.3 K/CU MM
LYMPHOCYTES RELATIVE PERCENT: 15.9 % (ref 24–44)
MCH RBC QN AUTO: 23.7 PG (ref 27–31)
MCHC RBC AUTO-ENTMCNC: 26.4 % (ref 32–36)
MCV RBC AUTO: 89.7 FL (ref 78–100)
MONOCYTES ABSOLUTE: 0.8 K/CU MM
MONOCYTES RELATIVE PERCENT: 9.4 % (ref 0–4)
NUCLEATED RBC %: 0.4 %
PDW BLD-RTO: 16.4 % (ref 11.7–14.9)
PLATELET # BLD: 574 K/CU MM (ref 140–440)
PMV BLD AUTO: 9 FL (ref 7.5–11.1)
RBC # BLD: 3.59 M/CU MM (ref 4.6–6.2)
SEGMENTED NEUTROPHILS ABSOLUTE COUNT: 5.8 K/CU MM
SEGMENTED NEUTROPHILS RELATIVE PERCENT: 69.9 % (ref 36–66)
TOTAL IMMATURE NEUTOROPHIL: 0.03 K/CU MM
TOTAL NUCLEATED RBC: 0 K/CU MM
WBC # BLD: 8.3 K/CU MM (ref 4–10.5)

## 2019-11-19 PROCEDURE — 85025 COMPLETE CBC W/AUTO DIFF WBC: CPT

## 2019-11-19 PROCEDURE — 36415 COLL VENOUS BLD VENIPUNCTURE: CPT

## 2019-11-26 ENCOUNTER — TELEPHONE (OUTPATIENT)
Dept: GASTROENTEROLOGY | Age: 74
End: 2019-11-26

## 2019-11-26 ENCOUNTER — HOSPITAL ENCOUNTER (OUTPATIENT)
Age: 74
Discharge: HOME | End: 2019-11-26
Payer: MEDICARE

## 2019-11-26 DIAGNOSIS — D64.9 ANEMIA, UNSPECIFIED TYPE: Primary | ICD-10-CM

## 2019-11-26 LAB
BASOPHILS ABSOLUTE: 0.1 K/CU MM
BASOPHILS RELATIVE PERCENT: 0.5 % (ref 0–1)
DIFFERENTIAL TYPE: ABNORMAL
EOSINOPHILS ABSOLUTE: 0.2 K/CU MM
EOSINOPHILS RELATIVE PERCENT: 1.4 % (ref 0–3)
HCT VFR BLD CALC: 32 % (ref 42–52)
HEMOGLOBIN: 8.8 GM/DL (ref 13.5–18)
IMMATURE NEUTROPHIL %: 0.4 % (ref 0–0.43)
LYMPHOCYTES ABSOLUTE: 1.2 K/CU MM
LYMPHOCYTES RELATIVE PERCENT: 10.5 % (ref 24–44)
MCH RBC QN AUTO: 24 PG (ref 27–31)
MCHC RBC AUTO-ENTMCNC: 27.5 % (ref 32–36)
MCV RBC AUTO: 87.2 FL (ref 78–100)
MONOCYTES ABSOLUTE: 0.9 K/CU MM
MONOCYTES RELATIVE PERCENT: 8.2 % (ref 0–4)
NUCLEATED RBC %: 0.4 %
PDW BLD-RTO: 16.3 % (ref 11.7–14.9)
PLATELET # BLD: 494 K/CU MM (ref 140–440)
PMV BLD AUTO: 9.3 FL (ref 7.5–11.1)
RBC # BLD: 3.67 M/CU MM (ref 4.6–6.2)
SEGMENTED NEUTROPHILS ABSOLUTE COUNT: 9.1 K/CU MM
SEGMENTED NEUTROPHILS RELATIVE PERCENT: 79 % (ref 36–66)
TOTAL IMMATURE NEUTOROPHIL: 0.05 K/CU MM
TOTAL NUCLEATED RBC: 0.1 K/CU MM
WBC # BLD: 11.5 K/CU MM (ref 4–10.5)

## 2019-11-26 PROCEDURE — 85025 COMPLETE CBC W/AUTO DIFF WBC: CPT

## 2019-11-26 PROCEDURE — 36415 COLL VENOUS BLD VENIPUNCTURE: CPT

## 2019-12-02 ENCOUNTER — ANESTHESIA EVENT (OUTPATIENT)
Dept: OPERATING ROOM | Age: 74
End: 2019-12-02
Payer: MEDICARE

## 2019-12-04 ENCOUNTER — HOSPITAL ENCOUNTER (OUTPATIENT)
Age: 74
Setting detail: OUTPATIENT SURGERY
Discharge: HOME | End: 2019-12-04
Attending: SPECIALIST | Admitting: SPECIALIST
Payer: MEDICARE

## 2019-12-04 ENCOUNTER — ANESTHESIA (OUTPATIENT)
Dept: OPERATING ROOM | Age: 74
End: 2019-12-04
Payer: MEDICARE

## 2019-12-04 VITALS
WEIGHT: 178 LBS | HEIGHT: 70 IN | SYSTOLIC BLOOD PRESSURE: 111 MMHG | TEMPERATURE: 97 F | RESPIRATION RATE: 16 BRPM | HEART RATE: 67 BPM | OXYGEN SATURATION: 95 % | BODY MASS INDEX: 25.48 KG/M2 | DIASTOLIC BLOOD PRESSURE: 53 MMHG

## 2019-12-04 VITALS — DIASTOLIC BLOOD PRESSURE: 52 MMHG | OXYGEN SATURATION: 97 % | SYSTOLIC BLOOD PRESSURE: 93 MMHG

## 2019-12-04 PROCEDURE — 6360000002 HC RX W HCPCS: Performed by: NURSE ANESTHETIST, CERTIFIED REGISTERED

## 2019-12-04 PROCEDURE — 3609155800 HC ENTEROSCOPY WITH INTERVENTION: Performed by: SPECIALIST

## 2019-12-04 PROCEDURE — 88305 TISSUE EXAM BY PATHOLOGIST: CPT

## 2019-12-04 PROCEDURE — 2720000010 HC SURG SUPPLY STERILE: Performed by: SPECIALIST

## 2019-12-04 PROCEDURE — 2580000003 HC RX 258: Performed by: SPECIALIST

## 2019-12-04 PROCEDURE — 2709999900 HC NON-CHARGEABLE SUPPLY: Performed by: SPECIALIST

## 2019-12-04 PROCEDURE — 7100000011 HC PHASE II RECOVERY - ADDTL 15 MIN: Performed by: SPECIALIST

## 2019-12-04 PROCEDURE — 2500000003 HC RX 250 WO HCPCS: Performed by: NURSE ANESTHETIST, CERTIFIED REGISTERED

## 2019-12-04 PROCEDURE — 3700000000 HC ANESTHESIA ATTENDED CARE: Performed by: SPECIALIST

## 2019-12-04 PROCEDURE — 7100000010 HC PHASE II RECOVERY - FIRST 15 MIN: Performed by: SPECIALIST

## 2019-12-04 PROCEDURE — 3700000001 HC ADD 15 MINUTES (ANESTHESIA): Performed by: SPECIALIST

## 2019-12-04 RX ORDER — LIDOCAINE HYDROCHLORIDE 20 MG/ML
INJECTION, SOLUTION EPIDURAL; INFILTRATION; INTRACAUDAL; PERINEURAL PRN
Status: DISCONTINUED | OUTPATIENT
Start: 2019-12-04 | End: 2019-12-04 | Stop reason: SDUPTHER

## 2019-12-04 RX ORDER — PROPOFOL 10 MG/ML
INJECTION, EMULSION INTRAVENOUS PRN
Status: DISCONTINUED | OUTPATIENT
Start: 2019-12-04 | End: 2019-12-04 | Stop reason: SDUPTHER

## 2019-12-04 RX ORDER — SODIUM CHLORIDE, SODIUM LACTATE, POTASSIUM CHLORIDE, CALCIUM CHLORIDE 600; 310; 30; 20 MG/100ML; MG/100ML; MG/100ML; MG/100ML
INJECTION, SOLUTION INTRAVENOUS CONTINUOUS
Status: DISCONTINUED | OUTPATIENT
Start: 2019-12-04 | End: 2019-12-04 | Stop reason: HOSPADM

## 2019-12-04 RX ADMIN — PROPOFOL 50 MG: 10 INJECTION, EMULSION INTRAVENOUS at 09:51

## 2019-12-04 RX ADMIN — PROPOFOL 30 MG: 10 INJECTION, EMULSION INTRAVENOUS at 09:48

## 2019-12-04 RX ADMIN — PROPOFOL 50 MG: 10 INJECTION, EMULSION INTRAVENOUS at 09:58

## 2019-12-04 RX ADMIN — PROPOFOL 50 MG: 10 INJECTION, EMULSION INTRAVENOUS at 09:46

## 2019-12-04 RX ADMIN — PROPOFOL 50 MG: 10 INJECTION, EMULSION INTRAVENOUS at 09:54

## 2019-12-04 RX ADMIN — PROPOFOL 50 MG: 10 INJECTION, EMULSION INTRAVENOUS at 09:56

## 2019-12-04 RX ADMIN — GLUCAGON HYDROCHLORIDE 0.5 MG: KIT at 09:55

## 2019-12-04 RX ADMIN — SODIUM CHLORIDE, POTASSIUM CHLORIDE, SODIUM LACTATE AND CALCIUM CHLORIDE: 600; 310; 30; 20 INJECTION, SOLUTION INTRAVENOUS at 08:48

## 2019-12-04 RX ADMIN — LIDOCAINE HYDROCHLORIDE 100 MG: 20 INJECTION, SOLUTION EPIDURAL; INFILTRATION; INTRACAUDAL; PERINEURAL at 09:46

## 2019-12-04 RX ADMIN — SODIUM CHLORIDE, POTASSIUM CHLORIDE, SODIUM LACTATE AND CALCIUM CHLORIDE: 600; 310; 30; 20 INJECTION, SOLUTION INTRAVENOUS at 09:23

## 2019-12-04 RX ADMIN — PROPOFOL 50 MG: 10 INJECTION, EMULSION INTRAVENOUS at 10:00

## 2019-12-04 ASSESSMENT — PAIN SCALES - GENERAL
PAINLEVEL_OUTOF10: 0
PAINLEVEL_OUTOF10: 0

## 2019-12-04 ASSESSMENT — PAIN - FUNCTIONAL ASSESSMENT: PAIN_FUNCTIONAL_ASSESSMENT: 0-10

## 2019-12-06 ENCOUNTER — HOSPITAL ENCOUNTER (OUTPATIENT)
Age: 74
Discharge: HOME | End: 2019-12-06
Payer: MEDICARE

## 2019-12-06 ENCOUNTER — TELEPHONE (OUTPATIENT)
Dept: CARDIOLOGY CLINIC | Age: 74
End: 2019-12-06

## 2019-12-06 ENCOUNTER — OFFICE VISIT (OUTPATIENT)
Dept: CARDIOLOGY CLINIC | Age: 74
End: 2019-12-06
Payer: MEDICARE

## 2019-12-06 VITALS
WEIGHT: 178.8 LBS | SYSTOLIC BLOOD PRESSURE: 130 MMHG | BODY MASS INDEX: 25.6 KG/M2 | HEART RATE: 66 BPM | DIASTOLIC BLOOD PRESSURE: 60 MMHG | HEIGHT: 70 IN

## 2019-12-06 DIAGNOSIS — D50.0 IRON DEFICIENCY ANEMIA DUE TO CHRONIC BLOOD LOSS: Primary | ICD-10-CM

## 2019-12-06 LAB
BASOPHILS ABSOLUTE: 0 K/CU MM
BASOPHILS RELATIVE PERCENT: 0.4 % (ref 0–1)
DIFFERENTIAL TYPE: ABNORMAL
EOSINOPHILS ABSOLUTE: 0.2 K/CU MM
EOSINOPHILS RELATIVE PERCENT: 2.4 % (ref 0–3)
HCT VFR BLD CALC: 32.5 % (ref 42–52)
HEMOGLOBIN: 8.9 GM/DL (ref 13.5–18)
IMMATURE NEUTROPHIL %: 0.5 % (ref 0–0.43)
LYMPHOCYTES ABSOLUTE: 1 K/CU MM
LYMPHOCYTES RELATIVE PERCENT: 11 % (ref 24–44)
MCH RBC QN AUTO: 24.1 PG (ref 27–31)
MCHC RBC AUTO-ENTMCNC: 27.4 % (ref 32–36)
MCV RBC AUTO: 88.1 FL (ref 78–100)
MONOCYTES ABSOLUTE: 0.8 K/CU MM
MONOCYTES RELATIVE PERCENT: 8.5 % (ref 0–4)
NUCLEATED RBC %: 0.2 %
PDW BLD-RTO: 16.8 % (ref 11.7–14.9)
PLATELET # BLD: 467 K/CU MM (ref 140–440)
PMV BLD AUTO: 9.2 FL (ref 7.5–11.1)
RBC # BLD: 3.69 M/CU MM (ref 4.6–6.2)
SEGMENTED NEUTROPHILS ABSOLUTE COUNT: 7.2 K/CU MM
SEGMENTED NEUTROPHILS RELATIVE PERCENT: 77.2 % (ref 36–66)
TOTAL IMMATURE NEUTOROPHIL: 0.05 K/CU MM
TOTAL NUCLEATED RBC: 0 K/CU MM
WBC # BLD: 9.3 K/CU MM (ref 4–10.5)

## 2019-12-06 PROCEDURE — 99214 OFFICE O/P EST MOD 30 MIN: CPT | Performed by: INTERNAL MEDICINE

## 2019-12-06 PROCEDURE — 3017F COLORECTAL CA SCREEN DOC REV: CPT | Performed by: INTERNAL MEDICINE

## 2019-12-06 PROCEDURE — G8484 FLU IMMUNIZE NO ADMIN: HCPCS | Performed by: INTERNAL MEDICINE

## 2019-12-06 PROCEDURE — G8427 DOCREV CUR MEDS BY ELIG CLIN: HCPCS | Performed by: INTERNAL MEDICINE

## 2019-12-06 PROCEDURE — 36415 COLL VENOUS BLD VENIPUNCTURE: CPT

## 2019-12-06 PROCEDURE — G8417 CALC BMI ABV UP PARAM F/U: HCPCS | Performed by: INTERNAL MEDICINE

## 2019-12-06 PROCEDURE — 4040F PNEUMOC VAC/ADMIN/RCVD: CPT | Performed by: INTERNAL MEDICINE

## 2019-12-06 PROCEDURE — G8598 ASA/ANTIPLAT THER USED: HCPCS | Performed by: INTERNAL MEDICINE

## 2019-12-06 PROCEDURE — 1036F TOBACCO NON-USER: CPT | Performed by: INTERNAL MEDICINE

## 2019-12-06 PROCEDURE — 1123F ACP DISCUSS/DSCN MKR DOCD: CPT | Performed by: INTERNAL MEDICINE

## 2019-12-06 PROCEDURE — 85025 COMPLETE CBC W/AUTO DIFF WBC: CPT

## 2020-01-24 ENCOUNTER — OFFICE VISIT (OUTPATIENT)
Dept: CARDIOLOGY CLINIC | Age: 75
End: 2020-01-24
Payer: MEDICARE

## 2020-01-24 VITALS
BODY MASS INDEX: 25.77 KG/M2 | HEIGHT: 70 IN | HEART RATE: 80 BPM | DIASTOLIC BLOOD PRESSURE: 90 MMHG | SYSTOLIC BLOOD PRESSURE: 170 MMHG | WEIGHT: 180 LBS

## 2020-01-24 PROCEDURE — 1123F ACP DISCUSS/DSCN MKR DOCD: CPT | Performed by: INTERNAL MEDICINE

## 2020-01-24 PROCEDURE — 99214 OFFICE O/P EST MOD 30 MIN: CPT | Performed by: INTERNAL MEDICINE

## 2020-01-24 PROCEDURE — 93000 ELECTROCARDIOGRAM COMPLETE: CPT | Performed by: INTERNAL MEDICINE

## 2020-01-24 PROCEDURE — G8427 DOCREV CUR MEDS BY ELIG CLIN: HCPCS | Performed by: INTERNAL MEDICINE

## 2020-01-24 PROCEDURE — 1036F TOBACCO NON-USER: CPT | Performed by: INTERNAL MEDICINE

## 2020-01-24 PROCEDURE — 3017F COLORECTAL CA SCREEN DOC REV: CPT | Performed by: INTERNAL MEDICINE

## 2020-01-24 PROCEDURE — 4040F PNEUMOC VAC/ADMIN/RCVD: CPT | Performed by: INTERNAL MEDICINE

## 2020-01-24 PROCEDURE — G8417 CALC BMI ABV UP PARAM F/U: HCPCS | Performed by: INTERNAL MEDICINE

## 2020-01-24 PROCEDURE — G8484 FLU IMMUNIZE NO ADMIN: HCPCS | Performed by: INTERNAL MEDICINE

## 2020-01-24 RX ORDER — FERROUS SULFATE 325(65) MG
325 TABLET ORAL
COMMUNITY
End: 2022-10-13

## 2020-01-24 NOTE — LETTER
CLINICAL STAFF DOCUMENTATION    Lamare Lesches  1945  [id number]    Have you had any Chest Pain - Yes  If Yes DO EKG - How does it feel - Dull Ache   How long does the pain last - minutes   How long have you been having the pain - Years   Did you take a Nitro   And did it relieve the pain - No    Have you had any Shortness of Breath - No     Have you had any dizziness - No       Have you had any palpitations - No       Is the patient on any of the following medications - NONE  If Yes DO EKG    Do you have any edema - swelling in NONE    If Yes - CHECK TO SEE IF THE EDEMA IS PITTING      Check Venous \"LEG PROBLEM Questionnaire\"    Do you have a surgery or procedure scheduled in the near future - No  If Yes- DO EKG    Ask patient [facility] if [facility] are not already [facility]    Check to see if we have an E-MAIL on file for the patient    Check medication list thoroughly!!!  BE SURE TO ASK PATIENT IF THEY NEED MEDICATION REFILLS

## 2020-01-24 NOTE — PROGRESS NOTES
Micah Menchaca MD        OFFICE  FOLLOWUP NOTE    Chief complaints: patient is here for management of  HTN, dyslipidemia, PAD ( aorta-fempop bypass), tiredness, SLEEP apnea. GI bleeding with AVM H/O PUD    Subjective: patient feels better, no chest pain, no shortness of breath, no dizziness, no palpitations    HPI Ayse Rosales is a 76 y. o.year old who  has a past medical history of Arthritis, CAD (coronary artery disease), H/O cardiac catheterization, History of blood clots, History of echocardiogram, History of nuclear stress test, Hx of Arterial Doppler ultrasound, Hx of blood clots, Hyperlipidemia, Hypertension, PAD (peripheral artery disease) ([facility] [address] and Venous malformation. and presents for management of CAD, DM, HTN, AFIB, which are well controlled, HIS HEMOGLOBIN IS 13,HE FEELS BETTER, HE IS NOT BLEEDING NOW,HIS STOOL COLOR IS STILL BLACK DUE TO PEPTO BISMOL. Current Outpatient Medications   Medication Sig Dispense Refill    ferrous sulfate 325 (65 Fe) MG tablet Take 325 mg by mouth daily (with breakfast)      NONFORMULARY 1 Dose every 3 months Pt gets Cortisone joint injection shots every 3 months in bilateral shoulders and Left hip, in [place]. [facility].  Dr. Johnson Messer nitroGLYCERIN (NITROSTAT) 0.4 MG SL tablet Place 1 tablet under the tongue every 5 minutes as needed for Chest pain 25 tablet 3    Cholecalciferol (VITAMIN D3) 2000 units TABS Take 1 tablet by mouth every morning       Magnesium 400 MG CAPS Take 1 capsule by mouth every morning       diclofenac sodium (VOLTAREN) 1 % GEL Apply 2 g topically nightly as needed       pantoprazole (PROTONIX) 40 MG tablet Take 1 tablet by mouth every morning (before breakfast) 30 tablet 3    pravastatin (PRAVACHOL) 20 MG tablet Take 20 mg by mouth nightly       warfarin (COUMADIN) 1 MG tablet Take 1 tablet by mouth every other day for 10 days (Patient taking differently: Take 4 mg by mouth every morning ) 30 tablet 0    Aspirin Buf,[id number], (BUFFERED ASPIRIN) 325 MG TABS Take by mouth every morning       fenofibrate (TRICOR) 54 MG tablet Take 54 mg by mouth every morning [facility] pharmacy: Please dispense generic fenofibrate unless prescriber denote       ascorbic acid (GNP VITAMIN C) 500 MG tablet Take 500 mg by mouth every morning       metoprolol succinate (TOPROL XL) 50 MG extended release tablet Take 50 mg by mouth 2 times daily      vitamin E 400 UNIT capsule Take 400 Units by mouth every morning       Multiple Vitamins-Minerals (CENTRUM SILVER PO) Take 1 tablet by mouth every morning        No current facility-administered medications for this visit.       Allergies: Azithromycin  Past Medical History:   Diagnosis Date    Arthritis     CAD (coronary artery disease)     H/O cardiac catheterization 2013 or 2014    No stenting    History of blood clots 1995    History of echocardiogram 05/17/2019    EF 50-55%, Mild concentric left ventricular hypertrophy, sclerotic but non stenotic aortic valve, Mild thickening of anterior leaflet of mitral valve, Mild Pulm HTN    History of nuclear stress test 05/20/2019    Normal LVEF, normal tracer uptake in all myocardial segment during rest and stress, decreased uptake inferiorly due to diaphragmatic artifact    Hx of Arterial Doppler ultrasound 05/17/2019    Normal bilateral ABIs, Right distal SFA has 20-49% stenosis, diffuse plaquing noted throughout    Hx of blood clots     Hyperlipidemia     Hypertension     PAD [facility]peripheral artery disease) ([facility] [address]     Venous malformation 11/2019     Past Surgical History:   Procedure Laterality Date    APPENDECTOMY      COLONOSCOPY N/A 10/18/2018    COLONOSCOPY WITH BIOPSY performed by Esme Bland MD at [address] [place] [address] Rd, COLON, DIAGNOSTIC      2 weeks ago    ENDOSCOPY, COLON, DIAGNOSTIC  11/01/2017    egd w/ biopsy, healing duodenal ulcers w/o recurrent bleeding possible barretts, small hiatal hernia    ENDOSCOPY, COLON, DIAGNOSTIC  12/04/2019    enteroscopy, bx, cauterization, avm stomach. hiatal hernia, short segment barretts    FEMORAL BYPASS Bilateral 1995    aortal-femoral bypass surgery     LAPAROSCOPIC APPENDECTOMY  10/26/2016    and primary closure umbilical hernia    UPPER GASTROINTESTINAL ENDOSCOPY N/A 12/4/2019    ENTEROSCOPY WITH INTERVENTION apc cauterization avm upper body of stomach, and biopsy performed by Bruno Main MD at [place] [address]     Family History   Problem Relation Age of Onset    Cancer Mother     Heart Attack Father     Hypertension Father     Crohn's Disease Sister     Deep Vein Thrombosis Sister      Social History     Tobacco Use    Smoking status: Former Smoker     Types: Cigars, Pipe    Smokeless tobacco: Never Used    Tobacco comment: patient states he vapes    Substance Use Topics    Alcohol use: Yes     Comment: occasionally       [unfilled]  Review of Systems:   · Constitutional: No Fever or Weight Loss   · Eyes: No Decreased Vision  · ENT: No Headaches, Hearing Loss or Vertigo  · Cardiovascular: No chest pain, dyspnea on exertion, palpitations or loss of consciousness  · Respiratory: No cough or wheezing    · Gastrointestinal: No abdominal pain, appetite loss, blood in stools, constipation, diarrhea or heartburn  · Genitourinary: No dysuria, trouble voiding, or hematuria  · Musculoskeletal:  No gait disturbance, weakness or joint complaints  · Integumentary: No rash or pruritis  · Neurological: No TIA or stroke symptoms  · Psychiatric: No anxiety or depression  · Endocrine: No malaise, fatigue or temperature intolerance  · Hematologic/Lymphatic: No bleeding problems, blood clots or swollen lymph nodes  · Allergic/Immunologic: No nasal congestion or hives  All systems negative except as marked.    Objective:  BP (!) 170/90 (Site: Left Upper Arm, Position: Sitting, Cuff Size: Medium Adult)   Pulse 80   Ht 5' 10\" (1.778 m)   Wt 180 lb (81.6 kg)   BMI 25.83 kg/m² Wt Readings from Last 3 Encounters:   01/24/20 180 lb (81.6 kg)   12/06/19 178 lb 12.8 oz (81.1 kg)   12/04/19 178 lb (80.7 kg)     Body mass index is 25.83 kg/m². GENERAL - Alert, oriented, pleasant, in no apparent distress,normal grooming  HEENT - pupils are reactive to light and accomodation, cornea intact, conjunctive normal color, ears are normal in exam,throat exam in normal, teeth, gum and palate are normal, oral mucosa is normal without any notation of pallor or cyanosis  Neck - Supple. No jugular venous distention noted. No carotid bruits, no apical -carotid delay  Respiratory:  Normal breath sounds, No respiratory distress, No wheezing, No chest tenderness. ,no use of accessory muscles, diaphragm movement is normal  Cardiovascular: (PMI) apex non displaced,no lifts no thrills, no s3,no s4, Normal heart rate, Normal rhythm, No murmurs, No rubs, No gallops. Carotid arteries pulse and amplitude are normal no bruit, no abdominal bruit noted ( normal abdominal aorta ausculation), femoral arteries pulse and amplitude are normal no bruit, pedal pulses are normal  Femoral pulses have normal amplitude, no bruits   Extremities - No cyanosis, clubbing, or significant edema, no varicose veins    Abdomen - No masses, tenderness, or organomegaly, no hepato-splenomegally, no bruits  Musculoskeletal - No significant edema, no kyphosis or scoliosis, no deformity in any extremity noted, muscle strength and tone are normal  Skin: no ulcer,no scar,no stasis dermatitis   Neurologic - alert oriented times 3,Cranial nerves II through XII are grossly intact. There were no gross focal neurologic abnormalities.  All sensory and motor nerves examined and were normal  Psychiatric: normal mood and affect    No results found for: CKTOTAL, CKMB, CKMBINDEX, TROPONINI  BNP:  No results found for: BNP  PT/INR:  No results found for: PTINR  No results found for: LABA1C  No results found for: CHOL, TRIG, HDL, LDLCALC, LDLDIRECT  Lab

## 2020-08-26 ENCOUNTER — HOSPITAL ENCOUNTER (OUTPATIENT)
Age: 75
Discharge: HOME | End: 2020-08-26
Payer: MEDICARE

## 2020-08-26 LAB
BASOPHILS ABSOLUTE: 0.1 K/CU MM
BASOPHILS RELATIVE PERCENT: 0.7 % (ref 0–1)
CHOLESTEROL: 233 MG/DL
DIFFERENTIAL TYPE: ABNORMAL
EOSINOPHILS ABSOLUTE: 0.3 K/CU MM
EOSINOPHILS RELATIVE PERCENT: 3.9 % (ref 0–3)
HCT VFR BLD CALC: 46.7 % (ref 42–52)
HDLC SERPL-MCNC: 54 MG/DL
HEMOGLOBIN: 14.1 GM/DL (ref 13.5–18)
IMMATURE NEUTROPHIL %: 0.5 % (ref 0–0.43)
INR BLD: 3.61 INDEX
LDL CHOLESTEROL DIRECT: 143 MG/DL
LYMPHOCYTES ABSOLUTE: 1.3 K/CU MM
LYMPHOCYTES RELATIVE PERCENT: 17 % (ref 24–44)
MCH RBC QN AUTO: 31.7 PG (ref 27–31)
MCHC RBC AUTO-ENTMCNC: 30.2 % (ref 32–36)
MCV RBC AUTO: 104.9 FL (ref 78–100)
MONOCYTES ABSOLUTE: 0.6 K/CU MM
MONOCYTES RELATIVE PERCENT: 7.6 % (ref 0–4)
NUCLEATED RBC %: 0 %
PDW BLD-RTO: 12.8 % (ref 11.7–14.9)
PLATELET # BLD: 392 K/CU MM (ref 140–440)
PMV BLD AUTO: 9.4 FL (ref 7.5–11.1)
PROTHROMBIN TIME: 44.2 SECONDS (ref 11.7–14.5)
RBC # BLD: 4.45 M/CU MM (ref 4.6–6.2)
SEGMENTED NEUTROPHILS ABSOLUTE COUNT: 5.4 K/CU MM
SEGMENTED NEUTROPHILS RELATIVE PERCENT: 70.3 % (ref 36–66)
TOTAL IMMATURE NEUTOROPHIL: 0.04 K/CU MM
TOTAL NUCLEATED RBC: 0 K/CU MM
TRIGL SERPL-MCNC: 228 MG/DL
WBC # BLD: 7.7 K/CU MM (ref 4–10.5)

## 2020-08-26 PROCEDURE — 80061 LIPID PANEL: CPT

## 2020-08-26 PROCEDURE — 85610 PROTHROMBIN TIME: CPT

## 2020-08-26 PROCEDURE — 83721 ASSAY OF BLOOD LIPOPROTEIN: CPT

## 2020-08-26 PROCEDURE — 85025 COMPLETE CBC W/AUTO DIFF WBC: CPT

## 2020-08-26 PROCEDURE — 36415 COLL VENOUS BLD VENIPUNCTURE: CPT

## 2020-08-26 RX ORDER — NITROGLYCERIN 0.4 MG/1
TABLET SUBLINGUAL
Qty: 25 TABLET | Refills: 0 | OUTPATIENT
Start: 2020-08-26

## 2020-10-03 ENCOUNTER — HOSPITAL ENCOUNTER (OUTPATIENT)
Age: 75
Discharge: HOME | End: 2020-10-03
Payer: MEDICARE

## 2020-10-03 PROCEDURE — 84154 ASSAY OF PSA FREE: CPT

## 2020-10-03 PROCEDURE — 84153 ASSAY OF PSA TOTAL: CPT

## 2020-10-03 PROCEDURE — 36415 COLL VENOUS BLD VENIPUNCTURE: CPT

## 2020-10-07 LAB
PROSTATE SPECIFIC ANTIGEN FREE: 0.6 NG/ML
PROSTATE SPECIFIC ANTIGEN PERCENT FREE: 11 %
PROSTATE SPECIFIC ANTIGEN: 5.6 NG/ML (ref 0–4)

## 2020-11-18 RX ORDER — NITROGLYCERIN 0.4 MG/1
TABLET SUBLINGUAL
Qty: 25 TABLET | Refills: 0 | OUTPATIENT
Start: 2020-11-18

## 2021-04-01 ENCOUNTER — TELEPHONE (OUTPATIENT)
Dept: CARDIOLOGY CLINIC | Age: 76
End: 2021-04-01

## 2021-04-01 NOTE — TELEPHONE ENCOUNTER
Cardiologist: Dr. Sincere Sommer  Surgeon:    Surgery: Left reverse total shoulder replacement  Anesthesia: General  Date: 5/13/2021  FAX# [phone]  # [phone]    Last OV 1/24/2020 w/Barry      1. GI BLEEDING, resolved, his hemoglobin in in 13( however, result is not in Epic). he has AVM on the EGD and PUD,CONTINUE COUMADIN  2. HTN: better off hctz/lisinopril, continue lopressor,  3. PAD: patient had aorta- to femoral graft clot, will continue coumadin and  normal LY noted, OFF  pletal as he has gi bleeing  4. Echo and stress test ordered showed possible diaphragmatic artifact,AS HE CLAIMS TO HAVE HAVE MINI heart attacks and as per Dr. Geovani Oconnell, HE HAd some blockage  5. Tiredness \" check tsh  6. Sleep apnea: recommend to follow up with pulmonary to adjust the CPAP, HE had it initially from [address] [facility]  7. dyslipidemia : continue statins and fenofibrates  1. Could not get records from PMD AS Dr. Viola Castorena maintenanceHealth maintenance: exerise and diet        NM- 5/20/2019  Supervising physician Dr. Sincere Sommer . normal stress test, normal LVEF, Normal    tracer uptake in all myocardial segment during rest and stress. Decreased    uptake inferiorly due to diaphragmatic artifact         Echo- 5/20/2019   Left ventricular systolic function is normal with an ejection fraction of   50-55%. Mild concentric left ventricular hypertrophy. Sclerotic, but non-stenotic aortic valve. Mild thickening of anterior leaflet of mitral valve. Right ventricular systolic pressure of 41 mmHg consistent with mild   pulmonary hypertension. No evidence of pericardial effusion.     Coumadin    Aspirin

## 2021-04-12 ENCOUNTER — HOSPITAL ENCOUNTER (OUTPATIENT)
Age: 76
Discharge: HOME | End: 2021-04-12
Payer: MEDICARE

## 2021-04-12 ENCOUNTER — TELEPHONE (OUTPATIENT)
Dept: CARDIOLOGY CLINIC | Age: 76
End: 2021-04-12

## 2021-04-12 ENCOUNTER — OFFICE VISIT (OUTPATIENT)
Dept: CARDIOLOGY CLINIC | Age: 76
End: 2021-04-12
Payer: MEDICARE

## 2021-04-12 VITALS
DIASTOLIC BLOOD PRESSURE: 84 MMHG | BODY MASS INDEX: 25.91 KG/M2 | HEART RATE: 68 BPM | HEIGHT: 70 IN | WEIGHT: 181 LBS | SYSTOLIC BLOOD PRESSURE: 138 MMHG

## 2021-04-12 DIAGNOSIS — I10 ESSENTIAL HYPERTENSION: ICD-10-CM

## 2021-04-12 DIAGNOSIS — E78.2 MIXED HYPERLIPIDEMIA: ICD-10-CM

## 2021-04-12 DIAGNOSIS — I73.9 PAD (PERIPHERAL ARTERY DISEASE) (HCC): Primary | ICD-10-CM

## 2021-04-12 DIAGNOSIS — Z01.818 PRE-OPERATIVE CLEARANCE: ICD-10-CM

## 2021-04-12 LAB
INR BLD: 2.89 INDEX
PROSTATE SPECIFIC ANTIGEN: 6.95 NG/ML (ref 0–4)
PROTHROMBIN TIME: 35.4 SECONDS (ref 11.7–14.5)

## 2021-04-12 PROCEDURE — G8427 DOCREV CUR MEDS BY ELIG CLIN: HCPCS | Performed by: NURSE PRACTITIONER

## 2021-04-12 PROCEDURE — 99214 OFFICE O/P EST MOD 30 MIN: CPT | Performed by: NURSE PRACTITIONER

## 2021-04-12 PROCEDURE — 85610 PROTHROMBIN TIME: CPT

## 2021-04-12 PROCEDURE — 4040F PNEUMOC VAC/ADMIN/RCVD: CPT | Performed by: NURSE PRACTITIONER

## 2021-04-12 PROCEDURE — G8417 CALC BMI ABV UP PARAM F/U: HCPCS | Performed by: NURSE PRACTITIONER

## 2021-04-12 PROCEDURE — 1036F TOBACCO NON-USER: CPT | Performed by: NURSE PRACTITIONER

## 2021-04-12 PROCEDURE — 93000 ELECTROCARDIOGRAM COMPLETE: CPT | Performed by: NURSE PRACTITIONER

## 2021-04-12 PROCEDURE — G0103 PSA SCREENING: HCPCS

## 2021-04-12 PROCEDURE — 1123F ACP DISCUSS/DSCN MKR DOCD: CPT | Performed by: NURSE PRACTITIONER

## 2021-04-12 PROCEDURE — 36415 COLL VENOUS BLD VENIPUNCTURE: CPT

## 2021-04-12 PROCEDURE — 3017F COLORECTAL CA SCREEN DOC REV: CPT | Performed by: NURSE PRACTITIONER

## 2021-04-12 RX ORDER — NITROGLYCERIN 0.4 MG/1
0.4 TABLET SUBLINGUAL EVERY 5 MIN PRN
Qty: 25 TABLET | Refills: 3 | Status: SHIPPED | OUTPATIENT
Start: 2021-04-12

## 2021-04-12 RX ORDER — PRAVASTATIN SODIUM 40 MG
40 TABLET ORAL DAILY
COMMUNITY
End: 2022-10-13 | Stop reason: SDUPTHER

## 2021-04-12 ASSESSMENT — ENCOUNTER SYMPTOMS
EYES NEGATIVE: 1
GASTROINTESTINAL NEGATIVE: 1
RESPIRATORY NEGATIVE: 1

## 2021-04-12 NOTE — PROGRESS NOTES
[facility] ([facility] [place] [address], [address] [facility],Third [address]  Phone: [phone]    Fax [phone]                  Shay Mccrary MD, Adele Mckay MD, Debra Reddy MD, MD Michelle Marte MD Karenann Rede, MD Leoncio Underwood, APRN      [facility], APRN  Dieudonne Nia, [facility], [facility]        Cardiology Progress Note      4/12/2021    RE: Claudine Suggs  (1945)                             Primary cardiologist: Dr. Michelle Brennan       Subjective:  CC:   1. PAD (peripheral artery disease) ([facility] [address]    2. Essential hypertension    3. Mixed hyperlipidemia    4. Pre-operative clearance        HPI: Claudine Suggs, who is a  76y.o. year old male with a past medical history as listed below. Patient presents to the office for follow up on PAD, HTN, and hyperlipidemia. Patient is  an active male who walks regularly. Patient is  compliant with medications. Patient denies any chest pain, shortness of breath, dizziness, syncope, or palpitations.     Past Medical History:   Diagnosis Date    Arthritis     CAD (coronary artery disease)     H/O cardiac catheterization 2013 or 2014    No stenting    History of blood clots 1995    History of echocardiogram 05/17/2019    EF 50-55%, Mild concentric left ventricular hypertrophy, sclerotic but non stenotic aortic valve, Mild thickening of anterior leaflet of mitral valve, Mild Pulm HTN    History of nuclear stress test 05/20/2019    Normal LVEF, normal tracer uptake in all myocardial segment during rest and stress, decreased uptake inferiorly due to diaphragmatic artifact    Hx of Arterial Doppler ultrasound 05/17/2019    Normal bilateral ABIs, Right distal SFA has 20-49% stenosis, diffuse plaquing noted throughout    Hx of blood clots     Hyperlipidemia     Hypertension     PAD (peripheral artery disease) ([facility] [address]     Venous malformation 11/2019       Current Outpatient Medications   Medication Sig Dispense Refill    nitroGLYCERIN (NITROSTAT) 0.4 MG SL tablet Place 1 tablet under the tongue every 5 minutes as needed for Chest pain 25 tablet 3    ferrous sulfate 325 (65 Fe) MG tablet Take 325 mg by mouth daily (with breakfast)      NONFORMULARY 1 Dose every 3 months Pt gets Cortisone joint injection shots every 3 months in bilateral shoulders and Left hip, in [facility]. Sports Medicine. Dr. Anne Ye Cholecalciferol (VITAMIN D3) 2000 units TABS Take 1 tablet by mouth every morning       Magnesium 400 MG CAPS Take 1 capsule by mouth every morning       diclofenac sodium (VOLTAREN) 1 % GEL Apply 2 g topically nightly as needed       pantoprazole (PROTONIX) 40 MG tablet Take 1 tablet by mouth every morning (before breakfast) 30 tablet 3    pravastatin (PRAVACHOL) 20 MG tablet Take 20 mg by mouth nightly       warfarin (COUMADIN) 1 MG tablet Take 1 tablet by mouth every other day for 10 days (Patient taking differently: Take 4 mg by mouth every morning ) 30 tablet 0    Aspirin Buf,[id number], (BUFFERED ASPIRIN) 325 MG TABS Take by mouth every morning       fenofibrate (TRICOR) 54 MG tablet Take 54 mg by mouth every morning [facility] pharmacy: Please dispense generic fenofibrate unless prescriber denote       ascorbic acid (GNP VITAMIN C) 500 MG tablet Take 500 mg by mouth every morning       metoprolol succinate (TOPROL XL) 50 MG extended release tablet Take 50 mg by mouth 2 times daily      vitamin E 400 UNIT capsule Take 400 Units by mouth every morning       Multiple Vitamins-Minerals (CENTRUM SILVER PO) Take 1 tablet by mouth every morning        No current facility-administered medications for this visit. Review of Systems:  Review of Systems   Constitutional: Negative. HENT: Negative. Eyes: Negative. Respiratory: Negative. Cardiovascular: Negative. Gastrointestinal: Negative. Genitourinary: Negative. Musculoskeletal: Positive for joint swelling. Neurological: Negative. Hematological: Negative. Psychiatric/Behavioral: Negative. All other systems reviewed and are negative. Objective:      Physical Exam:  /84 (Site: Right Upper Arm, Position: Sitting, Cuff Size: Medium Adult)   Pulse 68   Ht 5' 10\" (1.778 m)   Wt 181 lb (82.1 kg)   BMI 25.97 kg/m²   Wt Readings from Last 3 Encounters:   04/12/21 181 lb (82.1 kg)   01/24/20 180 lb (81.6 kg)   12/06/19 178 lb 12.8 oz (81.1 kg)     Body mass index is 25.97 kg/m². Physical exam:  Physical Exam   Constitutional: He is oriented to person, place, and time. He appears well-developed and well-nourished. HENT:   Head: Normocephalic. Eyes: Pupils are equal, round, and reactive to light. Neck: Normal range of motion. Neck supple. Cardiovascular: Normal rate, regular rhythm and normal heart sounds. Pulses:       Dorsalis pedis pulses are 1+ on the right side and 1+ on the left side. Posterior tibial pulses are 1+ on the right side and 1+ on the left side. Pulmonary/Chest: Effort normal and breath sounds normal.   Abdominal: Soft. Bowel sounds are normal.   Musculoskeletal: Normal range of motion. Neurological: He is alert and oriented to person, place, and time. Skin: Skin is warm and dry. Psychiatric: He has a normal mood and affect. Vitals reviewed.        DATA:  No results found for: CKTOTAL, CKMB, CKMBINDEX, TROPONINI  BNP:  No results found for: BNP  PT/INR:  No results found for: PTINR  No results found for: LABA1C  Lab Results   Component Value Date    CHOL 233 (H) 08/26/2020    TRIG 228 (H) 08/26/2020    HDL 54 08/26/2020    LDLDIRECT 143 (H) 08/26/2020     Lab Results   Component Value Date    ALT 8 (L) 10/31/2017    AST 14 (L) 10/31/2017     TSH:    Lab Results   Component Value Date    TSH 1.250 08/26/2019       Vitals:    04/12/21 1110   BP: 138/84   Pulse: 68       Echo:5/20/19  Left ventricular systolic function is normal with an ejection fraction of 50-55%. Mild concentric left ventricular hypertrophy. Sclerotic, but non-stenotic aortic valve. Mild thickening of anterior leaflet of mitral valve. Right ventricular systolic pressure of 41 mmHg consistent with mild   pulmonary hypertension. No evidence of pericardial effusion. Stress Test:2019  No ischemia       LY screenin  normal     The 10-year ASCVD risk score (Italia Jo, et al., 2013) is: 28.4%    Values used to calculate the score:      Age: 76 years      Sex: Male      Is Non- : No      Diabetic: No      Tobacco smoker: No      Systolic Blood Pressure: 216 mmHg      Is BP treated: No      HDL Cholesterol: 54 MG/DL      Total Cholesterol: 233 MG/DL      Assessment/ Plan:     PAD (peripheral artery disease) ([facility] [address]  -aortal-femoral bypass in , normal Ly in 2019. Patient had developed multiple clots 1 month post-op and is now on life long anticoagulation. Essential hypertension  -Stable, continue Toprol-XL 50 mg daily    Mixed hyperlipidemia  -At or near goal no, patient does not want to increase pravastatin at this time. Previously had some lathergy with higher doses.      -He is to continue current medications (fenofibrate and pravastatin) Hepatic function panel WNL. No abdominal pain. No myalgias.     -The nature of cardiac risk has been fully discussed with this patient. I have made him aware of his LDL target goal given his cardiovascular risk analysis. I have discussed the appropriate diet. The need for lifelong compliance in order to reduce risk is stressed. A regular exercise program is recommended to help achieve and maintain normal body weight, fitness and improve lipid balance. A written copy of a low fat, low cholesterol diet has been given to the patient.        Pre-operative clearance  Revised Cardiac Risk Index:   High risk type of surgery no   H/O ischemic heart disease  (h/o MI, or a positive stress test, current complaint of chest pain considered to be secondary to myocardial ischemia,  Use of nitrate therapy or ECG with pathological Q waves; do not count prior coronary revascularization procedure unless one of the other criteria for ischemic heart disease is present) no     H/O heart failure no  H/O CVA no   H/O DM treated with insulin no  Preoperative serum creatinine >2.0 mg/dl (177 micromol/L) no     The calculated rate of cardiac death, nonfatal myocardial infarction, and nonfatal cardiac arrest according to the number of predictors is; No risk factors  0.4% (95% CI: 0.1-0.8)     he is considered a low risk for surgery. Anticoagulation can be held prior to surgery at the discretion of the surgeon. Current recommendations are to hold 24-48 hours prior to surgery. It should be resumed as soon as possible if held. Patient seen, interviewed and examined. Testing was reviewed. Patient is encouraged to exercise even a brisk walk for 30 minutes at least 3 to 4 times a week. Lifestyle and risk factor modificatons discussed. Various goals are discussed and questions answered. Continue current medications. Appropriate prescriptions are addressed. Questions answered and patient verbalizes understanding. Call for any problems, questions, or concerns. Pt is to follow up in 6 months for Cardiac management    Electronically signed by GERRI Milton CNP on 4/12/2021 at 11:39 AM

## 2021-04-12 NOTE — TELEPHONE ENCOUNTER
Pt was seen today and states she wanted to up his carlie meds to 40 mg, pt states Family Dr agreed with her. Pt will stay on 40 mg. Please change on his med list. For this office it states 20 mg.  Nan Falcon

## 2021-04-12 NOTE — ASSESSMENT & PLAN NOTE
-At or near goal no, patient does not want to increase pravastatin at this time. Previously had some lathergy with higher doses.      -He is to continue current medications (fenofibrate and pravastatin) Hepatic function panel WNL. No abdominal pain. No myalgias.     -The nature of cardiac risk has been fully discussed with this patient. I have made him aware of his LDL target goal given his cardiovascular risk analysis. I have discussed the appropriate diet. The need for lifelong compliance in order to reduce risk is stressed. A regular exercise program is recommended to help achieve and maintain normal body weight, fitness and improve lipid balance. A written copy of a low fat, low cholesterol diet has been given to the patient.

## 2021-04-12 NOTE — ASSESSMENT & PLAN NOTE
-aortal-femoral bypass in 1995, normal Christian in 2019. Patient had developed multiple clots 1 month post-op and is now on life long anticoagulation.

## 2021-04-12 NOTE — LETTER
[address]  1945  [id number]    Have you had any Chest Pain that is not new? - Yes  If Yes DO EKG - How does it feel - Tightness   How long does the pain last - 5 minutes    How long have you been having the pain - Weeks   Did you take a Nitro   And did it relieve the pain - Yes    Have you had any Shortness of Breath - No  If Yes - When     Have you had any dizziness - No       Have you had any palpitations that are not new? - No       Is the patient on any of the following medications - NONE  If Yes DO EKG - Needs done every 6 months    Do you have any edema - swelling in No    If Yes - CHECK TO SEE IF THE EDEMA IS PITTING      When did you have your last labs drawn 8/26/20, in Epic    If we do not have these labs you are retrieve these labs for these providers! Do you have a surgery or procedure scheduled in the near future - Yes  If Yes- DO EKG  If Yes - Who is the surgery with?  Dr. Asia Prince  Phone number of physician     Fax number of physician     Type of surgery Left reverse total shoulder replacement, 5/13/2021    GIVE THIS INFORMATION TO ADINA WELCH     Ask patient if they want to sign up for [facility] if they are not already signed up     Check to see if we have an E-MAIL on file for the patient     Check medication list thoroughly!!! AND RECONCILE OUTSIDE MEDICATIONS  If dose has changed change the entire order not just the MG  BE SURE TO ASK PATIENT IF [address] check out add to every patient's \"wrap up\" the following dot phrase AFTERHOURSEDUCATION and ensure we explain this to our patients

## 2021-04-21 ENCOUNTER — TELEPHONE (OUTPATIENT)
Dept: CARDIOLOGY CLINIC | Age: 76
End: 2021-04-21

## 2021-05-12 PROBLEM — Z01.818 PRE-OPERATIVE CLEARANCE: Status: RESOLVED | Noted: 2021-04-12 | Resolved: 2021-05-12

## 2021-05-17 ENCOUNTER — HOSPITAL ENCOUNTER (OUTPATIENT)
Age: 76
Discharge: HOME | End: 2021-05-17
Payer: MEDICARE

## 2021-05-17 LAB
INR BLD: 1.66 INDEX
PROTHROMBIN TIME: 20.2 SECONDS (ref 11.7–14.5)

## 2021-05-17 PROCEDURE — 36415 COLL VENOUS BLD VENIPUNCTURE: CPT

## 2021-05-17 PROCEDURE — 85610 PROTHROMBIN TIME: CPT

## 2021-06-07 ENCOUNTER — HOSPITAL ENCOUNTER (OUTPATIENT)
Dept: PHYSICAL THERAPY | Age: 76
Setting detail: THERAPIES SERIES
Discharge: HOME | End: 2021-06-07
Payer: MEDICARE

## 2021-06-07 PROCEDURE — 97162 PT EVAL MOD COMPLEX 30 MIN: CPT

## 2021-06-07 PROCEDURE — 97016 VASOPNEUMATIC DEVICE THERAPY: CPT

## 2021-06-07 PROCEDURE — 97110 THERAPEUTIC EXERCISES: CPT

## 2021-06-07 ASSESSMENT — PAIN DESCRIPTION - PAIN TYPE: TYPE: SURGICAL PAIN

## 2021-06-07 ASSESSMENT — PAIN DESCRIPTION - DESCRIPTORS: DESCRIPTORS: SHARP

## 2021-06-07 ASSESSMENT — PAIN DESCRIPTION - ORIENTATION: ORIENTATION: LEFT;ANTERIOR;OUTER

## 2021-06-07 ASSESSMENT — PAIN SCALES - GENERAL: PAINLEVEL_OUTOF10: 0

## 2021-06-07 ASSESSMENT — PAIN - FUNCTIONAL ASSESSMENT: PAIN_FUNCTIONAL_ASSESSMENT: PREVENTS OR INTERFERES SOME ACTIVE ACTIVITIES AND ADLS

## 2021-06-07 ASSESSMENT — PAIN DESCRIPTION - PROGRESSION: CLINICAL_PROGRESSION: GRADUALLY IMPROVING

## 2021-06-07 ASSESSMENT — PAIN DESCRIPTION - FREQUENCY: FREQUENCY: INTERMITTENT

## 2021-06-07 ASSESSMENT — PAIN DESCRIPTION - LOCATION: LOCATION: SHOULDER

## 2021-06-07 NOTE — PLAN OF CARE
Outpatient Physical Therapy           [place]           [] Phone: [phone]   Fax: [phone]  [place]           [] Phone: [phone]   Fax: [phone]     To: Referring Practitioner: Jake Garcia PA-C    From: Dagoberto Hayes, PT, DPT, OCS    Patient: John Martinez         : 1945  Diagnosis: Diagnosis: L Reverse TSA   Treatment Diagnosis: Treatment Diagnosis: L UE stiffness, weakness, pain   Date: 2021    Physical Therapy Certification/Re-Certification Form  Dear Jake Garcia PA-C  The following patient has been evaluated for physical therapy services and for therapy to continue, insurance requires physician review of the treatment plan initially and every 90 days. Please review the attached evaluation and/or summary of the patient's plan of care, and verify that you agree therapy should continue by signing the attached document and sending it back to our office. Assessment:      Pt is 76year old male s/p L Reverse TSA 21. Pt now has difficulties completing all ADLS with use of L UE and sleeping. Pt demo deficits this date that include L UE PROM, scap/deltoid strength in neutral, elow ext ROM and pain. Pt will benefit with PT services with progression of strength/ROM, manual and modalities per protocol to return to OF. Pt prior to onset of current condition had min/no pain with able to complete full ADLs and outdoor activities. Patient received education on their current pathology and how their condition effects them with their functional activities. Patient understood discussion and questions were answered. Patient understands their activity limitations and understands rational for treatment progression.     Plan of Care/Treatment to date:  [x] Therapeutic Exercise  [x] Modalities:  [x] Therapeutic Activity     [] Ultrasound  [x] Electrical Stimulation  [] Gait Training      [] Cervical Traction [] Lumbar Traction  [x] Neuromuscular Re-education    [x] Cold/hotpack [] Iontophoresis   [x] Instruction in HEP      [x] Vasopneumatic    [] Dry Needling  [x] Manual Therapy               [] Aquatic Therapy       Other:          Frequency/Duration:  # Days per week: [] 1 day # Weeks: [] 1 week [] 5 weeks     [x] 2 days   [] 2 weeks [] 6 weeks     [] 3 days   [] 3 weeks [] 7 weeks     [] 4 days   [] 4 weeks [] 8 weeks         [] 9 weeks [x] 10 weeks         [] 11 weeks [] 12 weeks    Rehab Potential/Progress: [] Excellent [x] Good [] Fair  [] Poor     Goals:    Patient goals : return to full L UE function  Short term goals  Time Frame for Short term goals: Defer to Long Term Goals  Short term goal 1: 5 weeks 6/14/21  Short term goal 2: Pt will demo >75% full PROM with flex/ABD to ease reaching. Short term goal 3: Pt will discharge sling appropriately. Short term goal 4: Pt will report <55% disability per Quick DASH. Long term goals  Time Frame for Long term goals : 10 weeks 8/14/21  Long term goal 1: Pt will demo I with HEP/symptom management. Long term goal 2: Pt will demo AROM within 10 deg of R UE to ease ADLs. Long term goal 3: Pt will demo Good strength in neutral and able to lift 3# overhead to ease overhead reaching. Long term goal 4: Pt will report <35% Quick DASH to demo improved function. 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 Electronically signed by:  Favio Can PT, DPT, OCS  6/7/2021, 11:28 AM    6/7/2021 11:28 AM         If you have any questions or concerns, please don't hesitate to call.   Thank you for your referral.      Physician Signature:________________________________Date:_________ TIME: _____  By signing above, therapists plan is approved by physician

## 2021-06-07 NOTE — PROGRESS NOTES
Physical Therapy  Initial Assessment  Date: 2021  Patient Name: Chana Pierre  MRN: [id number]  : 1945     Treatment Diagnosis: L UE stiffness, weakness, pain    Restrictions       Subjective   General  Chart Reviewed: Yes  Patient assessed for rehabilitation services?: Yes  Referring Practitioner: Teo Lancaster PA-C  Diagnosis: L Reverse TSA  PT Visit Information  PT Insurance Information: [employer]  Subjective  Subjective: L shoulder pain and weakness for 5-6 years. Eventual MRI with tear and bone on bone. Decision to have surgery. DOS: 21 with donning sling since. Return followed at 2 weeks and expect to return he beleives at 6 week post op. Completing pendulum, elbow/wrist AROM and power  for home exercise program. Denies N/T. Difficulty sleeping due to sling and laying supine. Min pain since start of therapy. Pain Screening  Patient Currently in Pain: Yes  Pain Assessment  Pain Assessment: 0-10  Pain Level: 0 (Worst: 7-8/10)  Patient's Stated Pain Goal: No pain  Pain Type: Surgical pain  Pain Location: Shoulder  Pain Orientation: Left; Anterior; Outer  Pain Descriptors: Sharp  Pain Frequency: Intermittent  Clinical Progression: Gradually improving  Functional Pain Assessment: Prevents or interferes some active activities and ADLs  Vital Signs  Patient Currently in Pain: Yes    Vision/Hearing  Vision  Vision: Within Functional Limits  Hearing  Hearing: Within functional limits    Orientation  Orientation  Overall Orientation Status: Within Normal Limits    Social/Functional History  Social/Functional History  Lives With: Spouse  ADL Assistance: Needs assistance  Homemaking Assistance: Needs assistance  Ambulation Assistance: Independent  Transfer Assistance: Independent  Active : Yes  Mode of Transportation: Car  Occupation: Retired  Leisure & Hobbies: photography, gardening    Objective     Observation/Palpation  Palpation: Global [facility] and effects them with their functional activities. Patient understood discussion and questions were answered. Patient understands their activity limitations and understands rational for treatment progression. Treatment Diagnosis: L UE stiffness, weakness, pain  Prognosis: Good  Decision Making: Medium Complexity  Barriers to Learning: None  REQUIRES PT FOLLOW UP: Yes  Activity Tolerance  Activity Tolerance: Patient Tolerated treatment well         Plan   Plan  Times per week: 2  Plan weeks: 10  Specific instructions for Next Treatment: review HEP, manual PROM into flex/scaption to  deg, elbow ext, scap strengthening, light isometrics into ext/ER/flex (No IR) , gameready. SEE PROTOCOL  Current Treatment Recommendations: Strengthening, ROM, Neuromuscular Re-education, Home Exercise Program, Manual Therapy - Soft Tissue Mobilization, Modalities     Goals  Short term goals  Time Frame for Short term goals: Defer to Long Term Goals  Short term goal 1: 5 weeks 6/14/21  Short term goal 2: Pt will demo >75% full PROM with flex/ABD to ease reaching. Short term goal 3: Pt will discharge sling appropriately. Short term goal 4: Pt will report <55% disability per Quick DASH. Long term goals  Time Frame for Long term goals : 10 weeks 8/14/21  Long term goal 1: Pt will demo I with HEP/symptom management. Long term goal 2: Pt will demo AROM within 10 deg of R UE to ease ADLs. Long term goal 3: Pt will demo Good strength in neutral and able to lift 3# overhead to ease overhead reaching. Long term goal 4: Pt will report <35% Quick DASH to demo improved function. 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   Patient Goals   Patient goals : return to full L UE function        Rowena Mccauley, PT, DPT, OCS    6/7/2021 11:26 AM

## 2021-06-07 NOTE — FLOWSHEET NOTE
Outpatient Physical Therapy  [place]           [x] Phone: [phone]   Fax: [phone]  [place]           [] Phone: [phone]   Fax: [phone]        Physical Therapy Daily Treatment Note  Date:  2021    Patient Name:  Claudine Suggs    :  1945  MRN: [id number]  Restrictions/Precautions:  No ER past neutral, no active IR, sling till 6 weeks   Diagnosis:  L Reverse Total Shoulder Replacement     Date of Injury/Surgery: 21  Treatment Diagnosis:    L UE stiffness, weakness, pain   Insurance/Certification information: [employer]     Referring Physician:  Gabriela Horton PA-C   Next Doctor Visit:    Plan of care signed (Y/N): N, sent 21    Outcome Measure: Quick DASH: 77% disability   Visit# / total visits:   per POC  Pain level: 0/10   Goals:         Patient goals : return to full L UE function  Short term goals  Time Frame for Short term goals: Defer to Long Term Goals  Short term goal 1: 5 weeks 21  Short term goal 2: Pt will demo >75% full PROM with flex/ABD to ease reaching. Short term goal 3: Pt will discharge sling appropriately. Short term goal 4: Pt will report <55% disability per Quick DASH. Long term goals  Time Frame for Long term goals : 10 weeks 21  Long term goal 1: Pt will demo I with HEP/symptom management. Long term goal 2: Pt will demo AROM within 10 deg of R UE to ease ADLs. Long term goal 3: Pt will demo Good strength in neutral and able to lift 3# overhead to ease overhead reaching. Long term goal 4: Pt will report <35% Quick DASH to demo improved function. 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 Summary of Evaluation: Pt is 76year old male s/p L Reverse TSA 21. Pt now has difficulties completing all ADLS with use of L UE and sleeping. Pt demo deficits this date that include L UE PROM, scap/deltoid strength in neutral, elow ext ROM and pain.  Pt will benefit with PT services with progression of strength/ROM, manual and modalities per protocol to return to PLOF. Pt prior to onset of current condition had min/no pain with able to complete full ADLs and outdoor activities. Patient received education on their current pathology and how their condition effects them with their functional activities. Patient understood discussion and questions were answered. Patient understands their activity limitations and understands rational for treatment progression. Subjective:  See eval         Any changes in Ambulatory Summary Sheet? None        Objective:  See eval   COVID screening questions were asked and patient attested that there had been no contact or symptoms        Exercises: (No more than 4 columns)   Exercise/Equipment 6/7/21 #1 Date Date           WARM UP                     TABLE      *PROM table flexion slides with stool slides  10x  5\"     *scap squeezes with hands clasped 10x  3\"                          STANDING      *Elbow ext  10x  5\"     Pendulums into flex/ext       *iso shoulder ext into pillow/ball 10x2  2\"     Passive shoulder ROM with SB on table into flex/ext/circles                              PROPRIOCEPTION                                    MODALITIES                      Other Therapeutic Activities/Education:  Patient received education on their current pathology and how their condition effects them with their functional activities. Patient understood discussion and questions were answered. Patient understands their activity limitations and understands rational for treatment progression. Home Exercise Program:  HO issued, reviewed and discussed with patient. Pt agreed to comply. Manual Treatments:        Modalities:  Gameready to L shoulder in sitting, low pressure, 34 deg x10' for pain management. No adverse effects.         Communication with other providers:  POC sent 6/7/21       Assessment:  (Response towards treatment session) (Pain Rating) Pt is 76year old male s/p L Reverse TSA 5/13/21. Pt now has difficulties completing all ADLS with use of L UE and sleeping. Pt demo deficits this date that include L UE PROM, scap/deltoid strength in neutral, elow ext ROM and pain. Pt will benefit with PT services with progression of strength/ROM, manual and modalities per protocol to return to PLOF. Pt prior to onset of current condition had min/no pain with able to complete full ADLs and outdoor activities. Patient received education on their current pathology and how their condition effects them with their functional activities. Patient understood discussion and questions were answered. Patient understands their activity limitations and understands rational for treatment progression. Plan for Next Session:  review HEP, manual PROM into flex/scaption to  deg, elbow ext, scap strengthening, light isometrics into ext/ER/flex (No IR) , gameready.  SEE PROTOCOL      Time In / Time Out:   4020-3423         If BWC Please Indicate Time In/Out/Total Time  CPT Code Time in Time out Total Time                                                            Total for session             Timed Code/Total Treatment Minutes:  12/50'  12' TE, 1 PT eval, 10' vaso       Next Progress Note due:  Eval 6/7/21  Visit 10       Plan of Care Interventions:  [x] Therapeutic Exercise  [x] Modalities:  [x] Therapeutic Activity     [] Ultrasound  [x] Estim  [] Gait Training      [] Cervical Traction [] Lumbar Traction  [x] Neuromuscular Re-education    [x] Cold/hotpack [] Iontophoresis   [x] Instruction in HEP      [x] Vasopneumatic   [] Dry Needling    [x] Manual Therapy               [] Aquatic Therapy              Electronically signed by:  Samantha Vu PT, DPT, OCS  6/7/2021, 8:17 AM    6/7/2021 8:17 AM

## 2021-06-10 NOTE — FLOWSHEET NOTE
Patients Plan of Care was received and signed. Signed POC was scanned and placed in the patients chart.     Viet Omalley

## 2021-06-12 ENCOUNTER — HOSPITAL ENCOUNTER (OUTPATIENT)
Dept: PHYSICAL THERAPY | Age: 76
Setting detail: THERAPIES SERIES
Discharge: HOME | End: 2021-06-12
Payer: MEDICARE

## 2021-06-12 PROCEDURE — 97140 MANUAL THERAPY 1/> REGIONS: CPT

## 2021-06-12 PROCEDURE — 97016 VASOPNEUMATIC DEVICE THERAPY: CPT

## 2021-06-12 PROCEDURE — 97110 THERAPEUTIC EXERCISES: CPT

## 2021-06-12 NOTE — FLOWSHEET NOTE
Outpatient Physical Therapy  [place]           [x] Phone: [phone]   Fax: [phone]  [place]           [] Phone: [phone]   Fax: [phone]        Physical Therapy Daily Treatment Note  Date:  2021    Patient Name:  Gregoria Bonilla    :  1945  MRN: [id number]  Restrictions/Precautions:  No ER past neutral, no active IR, sling till 6 weeks   Diagnosis:  L Reverse Total Shoulder Replacement     Date of Injury/Surgery: 21  Treatment Diagnosis:    L UE stiffness, weakness, pain   Insurance[facility]Certification information[facility]     Referring Physician:  Melany Park PA-C   Next Doctor Visit:    Plan of care signed (Y/N): y    Outcome Measure: Quick DASH: 77% disability   Visit# / total visits:   per POC  Pain level: 6-7/10       Goals:         Patient goals : return to full L UE function  Short term goals  Time Frame for Short term goals: Defer to Long Term Goals  Short term goal 1: 5 weeks 21  Short term goal 2: Pt will demo >75% full PROM with flex/ABD to ease reaching. Short term goal 3: Pt will discharge sling appropriately. Short term goal 4: Pt will report <55% disability per Quick DASH. Long term goals  Time Frame for Long term goals : 10 weeks 21  Long term goal 1: Pt will demo I with HEP/symptom management. Long term goal 2: Pt will demo AROM within 10 deg of R UE to ease ADLs. Long term goal 3: Pt will demo Good strength in neutral and able to lift 3# overhead to ease overhead reaching. Long term goal 4: Pt will report <35% Quick DASH to demo improved function. 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 Summary of Evaluation: Pt is 76year old male s/p L Reverse TSA 21. Pt now has difficulties completing all ADLS with use of L UE and sleeping. Pt demo deficits this date that include L UE PROM, scap/deltoid strength in neutral, elow ext ROM and pain.  Pt will benefit with PT services with during his exercises but he does well maintaining passive movement only. Flexion/Scaption ROM is within protocol limitations without pain. End session pain: 0/10    Plan for Next Session:  review HEP, manual PROM into flex/scaption to  deg, elbow ext, scap strengthening, light isometrics into ext/ER/flex (No IR) , gameready.  SEE PROTOCOL      Time In / Time Out:   0930/1008      Timed Code/Total Treatment Minutes:  28'/38' 1 vaso 10' 1 TE 10' 1 man 25'      Next Progress Note due:  Eval 6/7/21  Visit 10       Plan of Care Interventions:  [x] Therapeutic Exercise  [x] Modalities:  [x] Therapeutic Activity     [] Ultrasound  [x] Estim  [] Gait Training      [] Cervical Traction [] Lumbar Traction  [x] Neuromuscular Re-education    [x] Cold/hotpack [] Iontophoresis   [x] Instruction in HEP      [x] Vasopneumatic   [] Dry Needling    [x] Manual Therapy               [] Aquatic Therapy              Electronically signed by:  Annette Zhou    8:20 AM   6/12/2021

## 2021-06-15 ENCOUNTER — HOSPITAL ENCOUNTER (OUTPATIENT)
Dept: PHYSICAL THERAPY | Age: 76
Setting detail: THERAPIES SERIES
Discharge: HOME | End: 2021-06-15
Payer: MEDICARE

## 2021-06-15 PROCEDURE — 97140 MANUAL THERAPY 1/> REGIONS: CPT

## 2021-06-15 PROCEDURE — 97016 VASOPNEUMATIC DEVICE THERAPY: CPT

## 2021-06-15 PROCEDURE — 97110 THERAPEUTIC EXERCISES: CPT

## 2021-06-15 NOTE — FLOWSHEET NOTE
Outpatient Physical Therapy  [place]           [x] Phone: [phone]   Fax: [phone]  [place]           [] Phone: [phone]   Fax: [phone]        Physical Therapy Daily Treatment Note  Date:  6/15/2021    Patient Name:  Lb Charles    :  1945  MRN: [id number]  Restrictions/Precautions:  No ER past neutral, no active IR, sling till 6 weeks   Diagnosis:  L Reverse Total Shoulder Replacement     Date of Injury/Surgery: 21  Treatment Diagnosis:    L UE stiffness, weakness, pain   Insurance/Certification information: [employer]     Referring Physician:  Manuela Murillo PA-C   Next Doctor Visit:    Plan of care signed (Y/N): y    Outcome Measure: Quick DASH: 77% disability   Visit# / total visits:  3/20 per POC  Pain level: 0/10 at rest; occasionally a 5/10 when moving around       Goals:         Patient goals : return to full L UE function  Short term goals  Time Frame for Short term goals: Defer to Long Term Goals  Short term goal 1: 5 weeks 21  Short term goal 2: Pt will demo >75% full PROM with flex/ABD to ease reaching. Short term goal 3: Pt will discharge sling appropriately. Short term goal 4: Pt will report <55% disability per Quick DASH. Long term goals  Time Frame for Long term goals : 10 weeks 21  Long term goal 1: Pt will demo I with HEP/symptom management. Long term goal 2: Pt will demo AROM within 10 deg of R UE to ease ADLs. Long term goal 3: Pt will demo Good strength in neutral and able to lift 3# overhead to ease overhead reaching. Long term goal 4: Pt will report <35% Quick DASH to demo improved function. 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 Summary of Evaluation: Pt is 76year old male s/p L Reverse TSA 21. Pt now has difficulties completing all ADLS with use of L UE and sleeping.  Pt demo deficits this date that include L UE PROM, scap/deltoid strength in neutral, elow ext ROM and pain. Pt will benefit with PT services with progression of strength/ROM, manual and modalities per protocol to return to PLOF. Pt prior to onset of current condition had min/no pain with able to complete full ADLs and outdoor activities. Patient received education on their current pathology and how their condition effects them with their functional activities. Patient understood discussion and questions were answered. Patient understands their activity limitations and understands rational for treatment progression. Subjective:   Pt arrives to tx session reporting 0/10 pain at rest.         Any changes in Ambulatory Summary Sheet? None        Objective:  COVID screening questions were asked and patient attested that there had been no contact or symptoms        Exercises: (No more than 4 columns)   Exercise/Equipment 6/7/21 #1 6/12/2021 #2 6/15/21  #3           WARM UP                     TABLE      *PROM table flexion slides with stool slides hands clasped patient seated in chair 10x  5\" 10*5\"  10*5\"    *scap squeezes with hands clasped 10x  3\" 2*10 2*10                        STANDING      *Elbow ext  10x  5\" 20* 20*   Pendulums into flex/ext   --    *iso shoulder ext into pillow/ball 10x2  2\" 2*10 2\" 2*10 2\"   Passive shoulder ROM with SB on table into flex/ext/circles   10* ea  10* ea                           PROPRIOCEPTION                                    MODALITIES                      Other Therapeutic Activities/Education: NONE       Home Exercise Program:  HO issued, reviewed and discussed with patient. Pt agreed to comply. Manual Treatments:  PROM flexion/scaption °,       Modalities:  Gameready to L shoulder in sitting, low pressure, 34 deg x10' for pain management. No adverse effects. Communication with other providers:  POC sent 6/7/21       Assessment:  Pt tolerated today's session well.  He is stiff and sore with passive movement; states feeling less stiff with PROM this date. Flexion/Scaption ROM is within protocol limitations without pain. End session pain: 0/10    Plan for Next Session:  review HEP, manual PROM into flex/scaption to  deg, elbow ext, scap strengthening, light isometrics into ext/ER/flex (No IR) , gameready.  SEE PROTOCOL      Time In / Time Out:   1600 / 1645      Timed Code/Total Treatment Minutes:  35'/45' 1 vaso 1 TE  1 man       Next Progress Note due:  Eval 6/7/21  Visit 10       Plan of Care Interventions:  [x] Therapeutic Exercise  [x] Modalities:  [x] Therapeutic Activity     [] Ultrasound  [x] Estim  [] Gait Training      [] Cervical Traction [] Lumbar Traction  [x] Neuromuscular Re-education    [x] Cold/hotpack [] Iontophoresis   [x] Instruction in HEP      [x] Vasopneumatic   [] Dry Needling    [x] Manual Therapy               [] Aquatic Therapy              Electronically signed by:  Edvin Lane PTA,    1:55 PM   6/15/2021

## 2021-06-18 ENCOUNTER — HOSPITAL ENCOUNTER (OUTPATIENT)
Dept: PHYSICAL THERAPY | Age: 76
Setting detail: THERAPIES SERIES
Discharge: HOME | End: 2021-06-18
Payer: MEDICARE

## 2021-06-18 PROCEDURE — 97140 MANUAL THERAPY 1/> REGIONS: CPT

## 2021-06-18 NOTE — FLOWSHEET NOTE
Outpatient Physical Therapy  [place]           [x] Phone: [phone]   Fax: [phone]  [place]           [] Phone: [phone]   Fax: [phone]        Physical Therapy Daily Treatment Note  Date:  2021    Patient Name:  Claudine Suggs    :  1945  MRN: [id number]  Restrictions/Precautions:  No ER past neutral, no active IR, sling till 6 weeks   Diagnosis:  L Reverse Total Shoulder Replacement     Date of Injury/Surgery: 21  Treatment Diagnosis:    L UE stiffness, weakness, pain   Insurance/Certification information: [employer]     Referring Physician:  Gabriela Horton PA-C   Next Doctor Visit:    Plan of care signed (Y/N): y    Outcome Measure: Quick DASH: 77% disability   Visit# / total visits:   per POC  Pain level: 5/10       Goals:         Patient goals : return to full L UE function  Short term goals  Time Frame for Short term goals: Defer to Long Term Goals  Short term goal 1: 5 weeks 21  Short term goal 2: Pt will demo >75% full PROM with flex/ABD to ease reaching. Short term goal 3: Pt will discharge sling appropriately. Short term goal 4: Pt will report <55% disability per Quick DASH. Long term goals  Time Frame for Long term goals : 10 weeks 21  Long term goal 1: Pt will demo I with HEP/symptom management. Long term goal 2: Pt will demo AROM within 10 deg of R UE to ease ADLs. Long term goal 3: Pt will demo Good strength in neutral and able to lift 3# overhead to ease overhead reaching. Long term goal 4: Pt will report <35% Quick DASH to demo improved function. 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 Summary of Evaluation: Pt is 76year old male s/p L Reverse TSA 21. Pt now has difficulties completing all ADLS with use of L UE and sleeping. Pt demo deficits this date that include L UE PROM, scap/deltoid strength in neutral, elow ext ROM and pain.  Pt will benefit with PT services with progression of strength/ROM, manual and modalities per protocol to return to PLOF. Pt prior to onset of current condition had min/no pain with able to complete full ADLs and outdoor activities. Patient received education on their current pathology and how their condition effects them with their functional activities. Patient understood discussion and questions were answered. Patient understands their activity limitations and understands rational for treatment progression. Subjective:   Pt reports he would rate his pain at 5/10 - which is about normal for him. Any changes in Ambulatory Summary Sheet? None        Objective:  See below    COVID screening questions were asked and patient attested that there had been no contact or symptoms        Exercises: (No more than 4 columns)   Exercise/Equipment 6/7/21 #1 6/12/2021 #2 6/15/21  #3 6/18/21  #4            WARM UP                        TABLE       *PROM table flexion slides with stool slides hands clasped patient seated in chair 10x  5\" 10*5\"  10*5\"     *scap squeezes with hands clasped 10x  3\" 2*10 2*10           Manual therapy    See below             STANDING       *Elbow ext  10x  5\" 20* 20*    Pendulums into flex/ext   --     *iso shoulder ext into pillow/ball 10x2  2\" 2*10 2\" 2*10 2\"    Passive shoulder ROM with SB on table into flex/ext/circles   10* ea  10* ea                               PROPRIOCEPTION                                          MODALITIES    none                     Other Therapeutic Activities/Education: NONE       Home Exercise Program:  HO issued, reviewed and discussed with patient. Pt agreed to comply.         Manual Treatments:  PROM/MOBS to the left shoulder joint complex with TPR/MFR to related hypertonicity      Modalities:  Pt would like to try going with out the Game Ready today and see if he notices a difference        Communication with other providers:  POC sent 6/7/21       Assessment:  Pt tolerated listed manual interventions well with a decrease in complaints of pain. Pt relaxes easily and is a pleasure to work with in the clinic today. Flexion/Scaption ROM remains within protocol limitations without pain. End session pain: 0/10      Plan for Next Session:  review HEP, manual PROM into flex/scaption to  deg, elbow ext, scap strengthening, light isometrics into ext/ER/flex (No IR) , gameready.  SEE PROTOCOL      Time In / Time Out:   1119/1207      Timed Code/Total Treatment Minutes:  MAN X 48' X 3      Next Progress Note due:  Eval 6/7/21  Visit 10       Plan of Care Interventions:  [x] Therapeutic Exercise  [x] Modalities:  [x] Therapeutic Activity     [] Ultrasound  [x] Estim  [] Gait Training      [] Cervical Traction [] Lumbar Traction  [x] Neuromuscular Re-education    [x] Cold/hotpack [] Iontophoresis   [x] Instruction in HEP      [x] Vasopneumatic   [] Dry Needling    [x] Manual Therapy               [] Aquatic Therapy              Electronically signed by:  Shruthi Guerrero II, PTA [id number]       7:18 AM   6/18/2021

## 2021-06-21 ENCOUNTER — HOSPITAL ENCOUNTER (OUTPATIENT)
Dept: PHYSICAL THERAPY | Age: 76
Setting detail: THERAPIES SERIES
Discharge: HOME | End: 2021-06-21
Payer: MEDICARE

## 2021-06-21 PROCEDURE — 97140 MANUAL THERAPY 1/> REGIONS: CPT

## 2021-06-21 PROCEDURE — 97110 THERAPEUTIC EXERCISES: CPT

## 2021-06-21 PROCEDURE — 97016 VASOPNEUMATIC DEVICE THERAPY: CPT

## 2021-06-21 NOTE — FLOWSHEET NOTE
Outpatient Physical Therapy  [place]           [x] Phone: [phone]   Fax: [phone]  [facility]           [] Phone: [phone]   Fax: [phone]        Physical Therapy Daily Treatment Note  Date:  2021    Patient Name:  Candice Coates    :  1945  MRN: [id number]  Restrictions/Precautions:  No ER past neutral, no active IR, sling till 6 weeks   Diagnosis:  L Reverse Total Shoulder Replacement     Date of Injury/Surgery: 21  Treatment Diagnosis:    L UE stiffness, weakness, pain   Insurance/Certification information: [employer]     Referring Physician:  Rina Hurst PA-C   Next Doctor Visit:    Plan of care signed (Y/N): y    Outcome Measure: Quick DASH: 77% disability   Visit# / total visits:   per POC  Pain level: 5/10 only when moving; denies pain at rest.       Goals:         Patient goals : return to full L UE function  Short term goals  Time Frame for Short term goals: Defer to Long Term Goals  Short term goal 1: 5 weeks 21  Short term goal 2: Pt will demo >75% full PROM with flex/ABD to ease reaching. Short term goal 3: Pt will discharge sling appropriately. Short term goal 4: Pt will report <55% disability per Quick DASH. Long term goals  Time Frame for Long term goals : 10 weeks 21  Long term goal 1: Pt will demo I with HEP/symptom management. Long term goal 2: Pt will demo AROM within 10 deg of R UE to ease ADLs. Long term goal 3: Pt will demo Good strength in neutral and able to lift 3# overhead to ease overhead reaching. Long term goal 4: Pt will report <35% Quick DASH to demo improved function. 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 Summary of Evaluation: Pt is 76year old male s/p L Reverse TSA 21. Pt now has difficulties completing all ADLS with use of L UE and sleeping. Pt demo deficits this date that include L UE PROM, scap/deltoid strength in neutral, elow ext ROM and pain.  Pt will benefit with PT services with progression of strength/ROM, manual and modalities per protocol to return to PLOF. Pt prior to onset of current condition had min/no pain with able to complete full ADLs and outdoor activities. Patient received education on their current pathology and how their condition effects them with their functional activities. Patient understood discussion and questions were answered. Patient understands their activity limitations and understands rational for treatment progression. Subjective:   Pt reports he would rate his pain at 5/10 - which is about normal for him. Any changes in Ambulatory Summary Sheet? None        Objective:  See below    COVID screening questions were asked and patient attested that there had been no contact or symptoms        Exercises: (No more than 4 columns)   Exercise/Equipment 6/15/21  #3 6/18/21  #4 6/21/21 #5           WARM UP                     TABLE      *PROM table flexion slides with stool slides hands clasped patient seated in chair 10*5\"   10*5\"    *scap squeezes with hands clasped 2*10  2*10         Manual therapy  See below See below            STANDING      *Elbow ext  20*  20*   Pendulums into flex/ext       *iso shoulder ext into pillow/ball 2*10 2\"  2*10 2\"   Passive shoulder ROM with SB on table into flex/ext/circles  10* ea   10* ea                          PROPRIOCEPTION                                    MODALITIES  none 10'                   Other Therapeutic Activities/Education: NONE       Home Exercise Program:  HO issued, reviewed and discussed with patient. Pt agreed to comply. Manual Treatments:  PROM/MOBS to the left shoulder joint complex with TPR/MFR to related hypertonicity      Modalities:   Gameready to L shoulder in sitting, low pressure, 34 deg x10' for pain management. No adverse effects.         Communication with other providers:  POC sent 6/7/21       Assessment:  Pt tolerated tx session well without complaints of increased pain. Flexion/Scaption ROM remains within protocol limitations without pain. End session pain: 0/10      Plan for Next Session:  review HEP, manual PROM into flex/scaption to  deg, elbow ext, scap strengthening, light isometrics into ext/ER/flex (No IR) , gameready.  SEE PROTOCOL      Time In / Time Out:    1118 / 1200      Timed Code/Total Treatment Minutes:   28' / 43'   1 man    1 vaso     1 TE      Next Progress Note due:  Eval 6/7/21  Visit 10       Plan of Care Interventions:  [x] Therapeutic Exercise  [x] Modalities:  [x] Therapeutic Activity     [] Ultrasound  [x] Estim  [] Gait Training      [] Cervical Traction [] Lumbar Traction  [x] Neuromuscular Re-education    [x] Cold/hotpack [] Iontophoresis   [x] Instruction in HEP      [x] Vasopneumatic   [] Dry Needling    [x] Manual Therapy               [] Aquatic Therapy              Electronically signed by:  Ina Ferreira PTA        9:02 AM   6/21/2021

## 2021-06-25 NOTE — FLOWSHEET NOTE
Outpatient Physical Therapy  [place]           [x] Phone: [phone]   Fax: [phone]  [place]           [] Phone: [phone]   Fax: [phone]        Physical Therapy Daily Treatment Note  Date:  2021    Patient Name:  Claudine Suggs    :  1945  MRN: [id number]  Restrictions/Precautions:  No ER past neutral, no active IR, sling till 6 weeks   Diagnosis:  L Reverse Total Shoulder Replacement     Date of Injury/Surgery: 21  Treatment Diagnosis:    L UE stiffness, weakness, pain   Insurance/Certification information: [employer]     Referring Physician:  Gabriela Horton PA-C   Next Doctor Visit:    Plan of care signed (Y/N): y    Outcome Measure: Quick DASH: 77% disability   Visit# / total visits:   per POC  Pain level: 7-8/10 at worst when moving, but 0-1/10 at rest      Goals:         Patient goals : return to full L UE function  Short term goals  Time Frame for Short term goals: Defer to Long Term Goals  Short term goal 1: 5 weeks 21  Short term goal 2: Pt will demo >75% full PROM with flex/ABD to ease reaching. Short term goal 3: Pt will discharge sling appropriately. Short term goal 4: Pt will report <55% disability per Quick DASH. Long term goals  Time Frame for Long term goals : 10 weeks 21  Long term goal 1: Pt will demo I with HEP/symptom management. Long term goal 2: Pt will demo AROM within 10 deg of R UE to ease ADLs. Long term goal 3: Pt will demo Good strength in neutral and able to lift 3# overhead to ease overhead reaching. Long term goal 4: Pt will report <35% Quick DASH to demo improved function. 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 Summary of Evaluation: Pt is 76year old male s/p L Reverse TSA 21. Pt now has difficulties completing all ADLS with use of L UE and sleeping. Pt demo deficits this date that include L UE PROM, scap/deltoid strength in neutral, elow ext ROM and pain.  Pt will benefit with PT services with progression of strength/ROM, manual and modalities per protocol to return to PLOF. Pt prior to onset of current condition had min/no pain with able to complete full ADLs and outdoor activities. Patient received education on their current pathology and how their condition effects them with their functional activities. Patient understood discussion and questions were answered. Patient understands their activity limitations and understands rational for treatment progression. Subjective:   Pt reports he has minimal to no pain at rest, but pain can get up to 7-8/10 at worst.  Pt saw the Dr who told him everything looked good, he could get rid of the sling, and that he was to tell the people in PT to make sure and stretch him out every session. Any changes in Ambulatory Summary Sheet? None      Objective:  See below    COVID screening questions were asked and patient attested that there had been no contact or symptoms    Exercises: (No more than 4 columns)   Exercise/Equipment 6/15/21  #3 6/18/21  #4 6/21/21 #5 6/26/21  #6            WARM UP                        TABLE       *PROM table flexion slides with stool slides hands clasped patient seated in chair 10*5\"   10*5\"     *scap squeezes with hands clasped 2*10  2*10           Manual therapy  See below See below See below             STANDING       *Elbow ext  20*  20*    Pendulums into flex/ext        *iso shoulder ext into pillow/ball 2*10 2\"  2*10 2\"    Passive shoulder ROM with SB on table into flex/ext/circles  10* ea   10* ea                              PROPRIOCEPTION                                          MODALITIES  none 10' 10'                     Other Therapeutic Activities/Education: None       Home Exercise Program:  HO issued, reviewed and discussed with patient. Pt agreed to comply.         Manual Treatments:  PROM/MOBS to the left shoulder joint complex with TPR/MFR to related hypertonicity, including supine scapular MOBS      Modalities: Gameready to L shoulder in sitting, low pressure, 34 deg x10' for pain management. No adverse effects. Communication with other providers:  POC sent 6/7/21       Assessment:  Pt is able to tolerate manual interventions well overall with no complaints of pain increase. Pt demo's decreased scapular mobility that dramatically improves with scapular MOBS performed in supine. Flexion/Scaption ROM remains within protocol limitations without pain. End session pain: 0/10      Plan for Next Session:  review HEP, manual PROM into flex/scaption to  deg, elbow ext, scap strengthening, light isometrics into ext/ER/flex (No IR) , gameready.  SEE PROTOCOL      Time In / Time Out:    1514/1614      Timed Code/Total Treatment Minutes:   MAN X 50' X 3;   GR X 10' X 1      Next Progress Note due:  Eval 6/7/21  Visit 10       Plan of Care Interventions:  [x] Therapeutic Exercise  [x] Modalities:  [x] Therapeutic Activity     [] Ultrasound  [x] Estim  [] Gait Training      [] Cervical Traction [] Lumbar Traction  [x] Neuromuscular Re-education    [x] Cold/hotpack [] Iontophoresis   [x] Instruction in HEP      [x] Vasopneumatic   [] Dry Needling    [x] Manual Therapy               [] Aquatic Therapy              Electronically signed by:  Amarilis Reisch II, PTA [id number]         9:32 AM   6/25/2021

## 2021-06-26 ENCOUNTER — HOSPITAL ENCOUNTER (OUTPATIENT)
Dept: PHYSICAL THERAPY | Age: 76
Setting detail: THERAPIES SERIES
Discharge: HOME | End: 2021-06-26
Payer: MEDICARE

## 2021-06-26 PROCEDURE — 97140 MANUAL THERAPY 1/> REGIONS: CPT

## 2021-06-26 PROCEDURE — 97016 VASOPNEUMATIC DEVICE THERAPY: CPT

## 2021-06-28 ENCOUNTER — HOSPITAL ENCOUNTER (OUTPATIENT)
Dept: PHYSICAL THERAPY | Age: 76
Setting detail: THERAPIES SERIES
Discharge: HOME | End: 2021-06-28
Payer: MEDICARE

## 2021-06-28 PROCEDURE — 97016 VASOPNEUMATIC DEVICE THERAPY: CPT

## 2021-06-28 PROCEDURE — 97140 MANUAL THERAPY 1/> REGIONS: CPT

## 2021-06-28 NOTE — FLOWSHEET NOTE
Outpatient Physical Therapy  [place]           [x] Phone: [phone]   Fax: [phone]  [place]           [] Phone: [phone]   Fax: [phone]        Physical Therapy Daily Treatment Note  Date:  2021    Patient Name:  Anuj Hernandez    :  1945  MRN: [id number]  Restrictions/Precautions:  No ER past neutral, no active IR, sling till 6 weeks   Diagnosis:  L Reverse Total Shoulder Replacement     Date of Injury/Surgery: 21  Treatment Diagnosis:    L UE stiffness, weakness, pain   Insurance/Certification information: [employer]     Referring Physician:  Oliverio Grayson PA-C   Next Doctor Visit:    Plan of care signed (Y/N): y    Outcome Measure: Quick DASH: 77% disability   Visit# / total visits:   per POC  Pain level: 7-8/10 at worst when moving, but 0-1/10 at rest      Goals:         Patient goals : return to full L UE function  Short term goals  Time Frame for Short term goals: Defer to Long Term Goals  Short term goal 1: 5 weeks 21  Short term goal 2: Pt will demo >75% full PROM with flex/ABD to ease reaching. Short term goal 3: Pt will discharge sling appropriately. Short term goal 4: Pt will report <55% disability per Quick DASH. Long term goals  Time Frame for Long term goals : 10 weeks 21  Long term goal 1: Pt will demo I with HEP/symptom management. Long term goal 2: Pt will demo AROM within 10 deg of R UE to ease ADLs. Long term goal 3: Pt will demo Good strength in neutral and able to lift 3# overhead to ease overhead reaching. Long term goal 4: Pt will report <35% Quick DASH to demo improved function. 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 Summary of Evaluation: Pt is 76year old male s/p L Reverse TSA 21. Pt now has difficulties completing all ADLS with use of L UE and sleeping. Pt demo deficits this date that include L UE PROM, scap/deltoid strength in neutral, elow ext ROM and pain.  Pt will benefit with PT services with progression of strength/ROM, manual and modalities per protocol to return to PLOF. Pt prior to onset of current condition had min/no pain with able to complete full ADLs and outdoor activities. Patient received education on their current pathology and how their condition effects them with their functional activities. Patient understood discussion and questions were answered. Patient understands their activity limitations and understands rational for treatment progression. Subjective:   Pt arrives to tx session reporting 0/10 pain at rest. States that in the morning when he gets up he has about a 7/10 pain; dissipates with time. Reports that \"the stretching that you guys do here is the most        Any changes in Ambulatory Summary Sheet? None      Objective:  See below    COVID screening questions were asked and patient attested that there had been no contact or symptoms    Exercises: (No more than 4 columns)   Exercise/Equipment 6/21/21 #5 6/26/21  #6 6/28/21 #7           WARM UP                     TABLE      *PROM table flexion slides with stool slides hands clasped patient seated in chair 10*5\"      *scap squeezes with hands clasped 2*10           Manual therapy See below See below See below            STANDING      *Elbow ext  20*     Pendulums into flex/ext       *iso shoulder ext into pillow/ball 2*10 2\"     Passive shoulder ROM with SB on table into flex/ext/circles  10* ea                            PROPRIOCEPTION                                    MODALITIES 10' 10' 10'                   Other Therapeutic Activities/Education: None       Home Exercise Program:  HO issued, reviewed and discussed with patient. Pt agreed to comply. Manual Treatments:  PROM/MOBS to the left shoulder joint complex with TPR/MFR to related hypertonicity      Modalities: Gameready to L shoulder in sitting, low pressure, 34 deg x10' for pain management. No adverse effects. Communication with other providers:  POC sent 6/7/21       Assessment:  Pt is able to tolerate manual interventions well overall with no complaints of pain increase. Flexion/Scaption ROM remains within protocol limitations without pain. End session pain: 3/10 after vaso      Plan for Next Session:  review HEP, manual PROM into flex/scaption to  deg, elbow ext, scap strengthening, light isometrics into ext/ER/flex (No IR) , gameready.  SEE PROTOCOL      Time In / Time Out:    1031 / 1119      Timed Code/Total Treatment Minutes:  45' / 50'    2 man    1 vaso       Next Progress Note due:  Eval 6/7/21  Visit 10       Plan of Care Interventions:  [x] Therapeutic Exercise  [x] Modalities:  [x] Therapeutic Activity     [] Ultrasound  [x] Estim  [] Gait Training      [] Cervical Traction [] Lumbar Traction  [x] Neuromuscular Re-education    [x] Cold/hotpack [] Iontophoresis   [x] Instruction in HEP      [x] Vasopneumatic   [] Dry Needling    [x] Manual Therapy               [] Aquatic Therapy              Electronically signed by:  Latisha Freeman PTA         8:57 AM   6/28/2021

## 2021-07-02 ENCOUNTER — HOSPITAL ENCOUNTER (OUTPATIENT)
Dept: PHYSICAL THERAPY | Age: 76
Setting detail: THERAPIES SERIES
Discharge: HOME | End: 2021-07-02
Payer: MEDICARE

## 2021-07-02 PROCEDURE — 97016 VASOPNEUMATIC DEVICE THERAPY: CPT

## 2021-07-02 PROCEDURE — 97140 MANUAL THERAPY 1/> REGIONS: CPT

## 2021-07-02 NOTE — FLOWSHEET NOTE
Outpatient Physical Therapy  [place]           [x] Phone: [phone]   Fax: [phone]  [place]           [] Phone: [phone]   Fax: [phone]        Physical Therapy Daily Treatment Note  Date:  2021    Patient Name:  Alejandra De La Rosa    :  1945  MRN: [id number]  Restrictions/Precautions:  No ER past neutral, no active IR, sling till 6 weeks   Diagnosis:  L Reverse Total Shoulder Replacement     Date of Injury/Surgery: 21  Treatment Diagnosis:    L UE stiffness, weakness, pain   Insurance/Certification information: [facility] [employer]     Referring Physician:  Romaine Lucia PA-C   Next Doctor Visit:    Plan of care signed (Y/N): y    Outcome Measure: Quick DASH: 77% disability   Visit# / total visits:   per POC  Pain level: 5/10    Goals:         Patient goals : return to full L UE function  Short term goals  Time Frame for Short term goals: Defer to Long Term Goals  Short term goal 1: 5 weeks 21  Short term goal 2: Pt will demo >75% full PROM with flex/ABD to ease reaching. Short term goal 3: Pt will discharge sling appropriately. Short term goal 4: Pt will report <55% disability per Quick DASH. Long term goals  Time Frame for Long term goals : 10 weeks 21  Long term goal 1: Pt will demo I with HEP/symptom management. Long term goal 2: Pt will demo AROM within 10 deg of R UE to ease ADLs. Long term goal 3: Pt will demo Good strength in neutral and able to lift 3# overhead to ease overhead reaching. Long term goal 4: Pt will report <35% Quick DASH to demo improved function. 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 Summary of Evaluation: Pt is 76year old male s/p L Reverse TSA 21. Pt now has difficulties completing all ADLS with use of L UE and sleeping. Pt demo deficits this date that include L UE PROM, scap/deltoid strength in neutral, elow ext ROM and pain.  Pt will benefit with PT services with progression of strength/ROM, manual and modalities per protocol to return to PLOF. Pt prior to onset of current condition had min/no pain with able to complete full ADLs and outdoor activities. Patient received education on their current pathology and how their condition effects them with their functional activities. Patient understood discussion and questions were answered. Patient understands their activity limitations and understands rational for treatment progression. Subjective:   Pt continues to report the pain is pretty extreme when he first wakes up in the morning - lasts for about 10-15 minutes then slowly gets better      Any changes in Ambulatory Summary Sheet? None      Objective:  See below    COVID screening questions were asked and patient attested that there had been no contact or symptoms    Exercises: (No more than 4 columns)   Exercise/Equipment 6/21/21 #5 6/26/21  #6 6/28/21 #7 7/2/21  #8            WARM UP                        TABLE       *PROM table flexion slides with stool slides hands clasped patient seated in chair 10*5\"       *scap squeezes with hands clasped 2*10             Manual therapy See below See below See below See below             STANDING       *Elbow ext  20*      Pendulums into flex/ext        *iso shoulder ext into pillow/ball 2*10 2\"      Passive shoulder ROM with SB on table into flex/ext/circles  10* ea                                PROPRIOCEPTION                                          MODALITIES 10' 10' 10' 10'                     Other Therapeutic Activities/Education: None       Home Exercise Program:  HO issued, reviewed and discussed with patient. Pt agreed to comply. Manual Treatments:  PROM/MOBS to the left shoulder joint complex with TPR/MFR to related hypertonicity      Modalities: Gameready to L shoulder in sitting, low pressure, 34 deg x10' for pain management. No adverse effects.         Communication with other providers:  POC sent 6/7/21

## 2021-07-06 ENCOUNTER — HOSPITAL ENCOUNTER (OUTPATIENT)
Dept: PHYSICAL THERAPY | Age: 76
Setting detail: THERAPIES SERIES
Discharge: HOME | End: 2021-07-06
Payer: MEDICARE

## 2021-07-06 PROCEDURE — 97110 THERAPEUTIC EXERCISES: CPT

## 2021-07-06 PROCEDURE — 97140 MANUAL THERAPY 1/> REGIONS: CPT

## 2021-07-06 NOTE — FLOWSHEET NOTE
Outpatient Physical Therapy  [place]           [x] Phone: [phone]   Fax: [phone]  [place]           [] Phone: [phone]   Fax: [phone]        Physical Therapy Daily Treatment Note  Date:  2021    Patient Name:  Brody Plunkett    :  1945  MRN: [id number]  Restrictions/Precautions:  No ER past neutral, no active IR, sling till 6 weeks   Diagnosis:  L Reverse Total Shoulder Replacement     Date of Injury/Surgery: 21  Treatment Diagnosis:    L UE stiffness, weakness, pain   Insurance/Certification information: [employer]     Referring Physician:  Leslie Diop PA-C   Next Doctor Visit:    Plan of care signed (Y/N): y    Outcome Measure: Quick DASH: 77% disability   Visit# / total visits:   per POC  Pain level: 5/10    Goals:         Patient goals : return to full L UE function  Short term goals  Time Frame for Short term goals: Defer to Long Term Goals  Short term goal 1: 5 weeks 21  Short term goal 2: Pt will demo >75% full PROM with flex/ABD to ease reaching. Progressing   Short term goal 3: Pt will discharge sling appropriately. Met   Short term goal 4: Pt will report <55% disability per Quick DASH. Long term goals  Time Frame for Long term goals : 10 weeks 21  Long term goal 1: Pt will demo I with HEP/symptom management. Reports partial compliance   Long term goal 2: Pt will demo AROM within 10 deg of R UE to ease ADLs. Long term goal 3: Pt will demo Good strength in neutral and able to lift 3# overhead to ease overhead reaching. Long term goal 4: Pt will report <35% Quick DASH to demo improved function. 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 Summary of Evaluation: Pt is 76year old male s/p L Reverse TSA 21. Pt now has difficulties completing all ADLS with use of L UE and sleeping. Pt demo deficits this date that include L UE PROM, scap/deltoid strength in neutral, elow ext ROM and pain.  Pt will benefit with PT services with progression of strength/ROM, manual and modalities per protocol to return to OF. Pt prior to onset of current condition had min/no pain with able to complete full ADLs and outdoor activities. Patient received education on their current pathology and how their condition effects them with their functional activities. Patient understood discussion and questions were answered. Patient understands their activity limitations and understands rational for treatment progression. Subjective:   Nav Macias arrives to therapy stating that the shoulder is feeling okay, just sore. Any changes in Ambulatory Summary Sheet? None      Objective:  COVID screening questions were asked and patient attested that there had been no contact or symptoms    PROM  Flexion 148°  Abduction 136°  ER 35°      Exercises: (No more than 4 columns)   Exercises  7/6/2021         Warm Up     Pulley's Flexion/Abduction          Table      AAROM supine cane press up 10*    AAROM supine cane ER 10*5\"    AAROM supine cane flexion  10*5\"    Supine circles 10* ea dir          Standing     Shoulder iso's in all directions except for IR 10*5\"    Scap squeezes  10*3\"                                      Other Therapeutic Activities/Education: None       Home Exercise Program:  7/6: issued isometrics for home program (NO IR)      Manual Treatments:  PROM to L shoulder inot      Modalities: Gameready to L shoulder in sitting, low pressure, 34 deg x10' for pain management. No adverse effects. Communication with other providers:  POC sent 6/7/21       Assessment:  Nav Macias is making nice progress towards his goals with improvements made in all planes of movement. He remains the most limited into ER and abduction which is typical for this stage of healing. He was able to progress to [facility] and isometric activities this date with good tolerance.  He will continue to benefit from skilled PT services in order to progress ROM and strength for return to PLOF. End session pain: 0/10      Plan for Next Session:  review HEP, manual PROM into flex/scaption to  deg, elbow ext, scap strengthening, light isometrics into ext/ER/flex (No IR) , gameready.  SEE PROTOCOL      Time In / Time Out:   1020/1055       Timed Code/Total Treatment Minutes:  28' 1 man 15' 1 TE 20'    Next Progress Note due:  Eval 6/7/21  Visit 10       Plan of Care Interventions:  [x] Therapeutic Exercise  [x] Modalities:  [x] Therapeutic Activity     [] Ultrasound  [x] Estim  [] Gait Training      [] Cervical Traction [] Lumbar Traction  [x] Neuromuscular Re-education    [x] Cold/hotpack [] Iontophoresis   [x] Instruction in HEP      [x] Vasopneumatic   [] Dry Needling    [x] Manual Therapy               [] Aquatic Therapy              Electronically signed by:  Monserrat Weir PTA         7:50 AM   7/6/2021

## 2021-07-08 ENCOUNTER — HOSPITAL ENCOUNTER (OUTPATIENT)
Dept: PHYSICAL THERAPY | Age: 76
Setting detail: THERAPIES SERIES
Discharge: HOME | End: 2021-07-08
Payer: MEDICARE

## 2021-07-08 PROCEDURE — 97016 VASOPNEUMATIC DEVICE THERAPY: CPT

## 2021-07-08 PROCEDURE — 97140 MANUAL THERAPY 1/> REGIONS: CPT

## 2021-07-08 PROCEDURE — 97110 THERAPEUTIC EXERCISES: CPT

## 2021-07-08 NOTE — FLOWSHEET NOTE
Outpatient Physical Therapy  [place]           [x] Phone: [phone]   Fax: [phone]  [place]           [] Phone: [phone]   Fax: [phone]        Physical Therapy Daily Treatment Note  Date:  2021    Patient Name:  Sirisha Goldman    :  1945  MRN: [id number]  Restrictions/Precautions:  No ER past neutral, no active IR, sling till 6 weeks   Diagnosis:  L Reverse Total Shoulder Replacement     Date of Injury/Surgery: 21  Treatment Diagnosis:    L UE stiffness, weakness, pain   Insurance/Certification information: [employer]     Referring Physician:  Aidee Marmolejo PA-C   Next Doctor Visit:    Plan of care signed (Y/N): y    Outcome Measure: Quick DASH: 77% disability   Visit# / total visits:  10/20 per POC  Pain level: 5/10 with activity, 0/10 at rest    Goals:         Patient goals : return to full L UE function  Short term goals  Time Frame for Short term goals: Defer to Long Term Goals  Short term goal 1: 5 weeks 21  Short term goal 2: Pt will demo >75% full PROM with flex/ABD to ease reaching. Progressing   Short term goal 3: Pt will discharge sling appropriately. Met   Short term goal 4: Pt will report <55% disability per Quick DASH. 21 Quick Dash Score = 50%  Long term goals  Time Frame for Long term goals : 10 weeks 21  Long term goal 1: Pt will demo I with HEP/symptom management. Reports partial compliance   Long term goal 2: Pt will demo AROM within 10 deg of R UE to ease ADLs. Long term goal 3: Pt will demo Good strength in neutral and able to lift 3# overhead to ease overhead reaching. Long term goal 4: Pt will report <35% Quick DASH to demo improved function. 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 Summary of Evaluation: Pt is 76year old male s/p L Reverse TSA 21. Pt now has difficulties completing all ADLS with use of L UE and sleeping.  Pt demo deficits this date that include L UE PROM, scap/deltoid strength in neutral, elow ext ROM and pain. Pt will benefit with PT services with progression of strength/ROM, manual and modalities per protocol to return to OF. Pt prior to onset of current condition had min/no pain with able to complete full ADLs and outdoor activities. Patient received education on their current pathology and how their condition effects them with their functional activities. Patient understood discussion and questions were answered. Patient understands their activity limitations and understands rational for treatment progression. Subjective:   Benjamin Elise states his pain is usually around 5/10 when he tries to use his arm. Has no pain at rest. Also states he did well with cane exercises last visit. Any changes in Ambulatory Summary Sheet? None      Objective:  COVID screening questions were asked and patient attested that there had been no contact or symptoms    PROM  Flexion 148°  Abduction 136°  ER 35°    Quick DASH: 50% disability       Exercises: (No more than 4 columns)   Exercises  7/6/2021 7/8/21 #10        Warm Up     Pulley's Flexion/Abduction          Table      AAROM supine cane press up 10* 10x   AAROM supine cane ER 10*5\" 10*5\"   AAROM supine cane flexion  10*5\" 10*5\"   Supine circles 10* ea dir  10* ea dir   Supine AA/punch  2x10 with min A   Supine AA/flex  2x10 with mod A   sidelying ER  2x10 with min A        Standing     Shoulder iso's in all directions except for IR 10*5\"    Scap squeezes  10*3\" 10*3\"                                     Other Therapeutic Activities/Education: None       Home Exercise Program:  7/6: issued isometrics for home program (NO IR)  7/8/21 Added supine cane flex and ER. Provided with handouts    Manual Treatments:  PROM to L shoulder flex, abd and ER      Modalities: Gameready to L shoulder in sitting, low pressure, 34 deg x10' for pain management. No adverse effects.         Communication with other providers:  POC sent 6/7/21 Assessment:  Derrick Valdes is making nice progress towards his goals. Progressing to [facility] and isometric activities with overall good tolerance. Did have some discomfort with AA/ER in sidelying. He will continue to benefit from skilled PT services in order to progress ROM and strength for return to PLOF. End session pain: 0/10      Plan for Next Session:  review HEP, manual PROM into flex/scaption to  deg, elbow ext, scap strengthening, light isometrics into ext/ER/flex (No IR) , gameready.  SEE PROTOCOL      Time In / Time Out:   2990/3250       Timed Code/Total Treatment Minutes:  65/46, (1) TE, (1) MT, (1) vaso    Next Progress Note due:  Eval 6/7/21  Visit 10       Plan of Care Interventions:  [x] Therapeutic Exercise  [x] Modalities:  [x] Therapeutic Activity     [] Ultrasound  [x] Estim  [] Gait Training      [] Cervical Traction [] Lumbar Traction  [x] Neuromuscular Re-education    [x] Cold/hotpack [] Iontophoresis   [x] Instruction in HEP      [x] Vasopneumatic   [] Dry Needling    [x] Manual Therapy               [] Aquatic Therapy              Electronically signed by:  Jillian Qureshi PTA         9:56 AM   7/8/2021

## 2021-07-09 NOTE — PROGRESS NOTES
therapy per protocol. Pt agreed to this plan. Goal Status:  [] Achieved [x] Partially Achieved  [] Not Achieved       Patient goals : return to full L UE function  Short term goals  Time Frame for Short term goals: Defer to Long Term Goals  Short term goal 1: 5 weeks 6/14/21  Short term goal 2: Pt will demo >75% full PROM with flex/ABD to ease reaching. Progressing   Short term goal 3: Pt will discharge sling appropriately. Met   Short term goal 4: Pt will report <55% disability per Quick DASH. 7/8/21 Quick Dash Score = 50%  Long term goals  Time Frame for Long term goals : 10 weeks 8/14/21  Long term goal 1: Pt will demo I with HEP/symptom management. Reports partial compliance   Long term goal 2: Pt will demo AROM within 10 deg of R UE to ease ADLs. Long term goal 3: Pt will demo Good strength in neutral and able to lift 3# overhead to ease overhead reaching. Long term goal 4: Pt will report <35% Quick DASH to demo improved function. 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 Rehab Potential: [] Excellent [x] Good [] Fair  [] Poor         Patient Status: [x] Continue per initial plan of Care     [] Patient now discharged     [] Additional visits requested, Please re-certify for additional visits: If we are requesting more visits, we fully anticipate the patient's condition is expected to improve within the treatment timeframe we are requesting. Electronically signed by:  Fela Alexander, PT, DPT, OCS  7/9/2021, 7:50 AM    7/9/2021 7:50 AM     If you have any questions or concerns, please don't hesitate to call.   Thank you for your referral.    Physician Signature:______________________ Date:______ Time: ________  By signing above, therapists plan is approved by physician

## 2021-07-16 ENCOUNTER — HOSPITAL ENCOUNTER (OUTPATIENT)
Dept: PHYSICAL THERAPY | Age: 76
Setting detail: THERAPIES SERIES
Discharge: HOME | End: 2021-07-16
Payer: MEDICARE

## 2021-07-16 PROCEDURE — 97140 MANUAL THERAPY 1/> REGIONS: CPT

## 2021-07-16 PROCEDURE — 97110 THERAPEUTIC EXERCISES: CPT

## 2021-07-16 PROCEDURE — 97016 VASOPNEUMATIC DEVICE THERAPY: CPT

## 2021-07-16 NOTE — FLOWSHEET NOTE
Outpatient Physical Therapy  [place]           [x] Phone: [phone]   Fax: [phone]  [place]           [] Phone: [phone]   Fax: [phone]        Physical Therapy Daily Treatment Note  Date:  2021    Patient Name:  Ruslan SahuB:  1945  MRN: [id number]  Restrictions/Precautions:  No ER past neutral, no active IR, sling till 6 weeks   Diagnosis:  L Reverse Total Shoulder Replacement     Date of Injury/Surgery: 21  Treatment Diagnosis:    L UE stiffness, weakness, pain   Insurance/Certification information: [employer]     Referring Physician:  Henry Addison PA-C   Next Doctor Visit:    Plan of care signed (Y/N): y    Outcome Measure: Quick DASH: 77% disability   Visit# / total visits:   per POC  Pain level: 5/10 with activity, 0/10 at rest    Goals:         Patient goals : return to full L UE function  Short term goals  Time Frame for Short term goals: Defer to Long Term Goals  Short term goal 1: 5 weeks 21  Short term goal 2: Pt will demo >75% full PROM with flex/ABD to ease reaching. Progressing   Short term goal 3: Pt will discharge sling appropriately. Met   Short term goal 4: Pt will report <55% disability per Quick DASH. 21 Quick Dash Score = 50%  Long term goals  Time Frame for Long term goals : 10 weeks 21  Long term goal 1: Pt will demo I with HEP/symptom management. Reports partial compliance   Long term goal 2: Pt will demo AROM within 10 deg of R UE to ease ADLs. Long term goal 3: Pt will demo Good strength in neutral and able to lift 3# overhead to ease overhead reaching. Long term goal 4: Pt will report <35% Quick DASH to demo improved function. 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 Summary of Evaluation: Pt is 76year old male s/p L Reverse TSA 21. Pt now has difficulties completing all ADLS with use of L UE and sleeping.  Pt demo deficits this date that include L UE PROM, scap/deltoid strength in neutral, elow ext ROM and pain. Pt will benefit with PT services with progression of strength/ROM, manual and modalities per protocol to return to PLOF. Pt prior to onset of current condition had min/no pain with able to complete full ADLs and outdoor activities. Patient received education on their current pathology and how their condition effects them with their functional activities. Patient understood discussion and questions were answered. Patient understands their activity limitations and understands rational for treatment progression. Subjective:   Pt reports he is doing OK today with a pain rating of 5/10 - which is about his normal these days. Any changes in Ambulatory Summary Sheet? None      Objective:  COVID screening questions were asked and patient attested that there had been no contact or symptoms           Exercises: (No more than 4 columns)   Exercises  7/6/2021 7/8/21 #10 7/16/21  #11         Warm Up      Pulley's Flexion/Abduction   1x10 flex         Table       AAROM supine cane press up 10* 10x 1# bar 1x15   AAROM supine cane ER 10*5\" 10*5\"    AAROM supine cane flexion  10*5\" 10*5\" 1# bar 1x15   Supine circles 10* ea dir  10* ea dir 1x15 cw/ccw - AROM   Supine AA/punch  2x10 with min A    Supine AA/flex  2x10 with mod A    sidelying ER  2x10 with min A 1x15 - AROM   ABC's at 90 deg flex   1 x thru - AROM   SA push 90 deg flex   1x15 - AROM         Manual therapy   See below         Standing      Shoulder iso's in all directions except for IR 10*5\"     Scap squeezes  10*3\" 10*3\"                      Modalities      Game Ready   10'             Other Therapeutic Activities/Education: None       Home Exercise Program:  7/6: issued isometrics for home program (NO IR)  7/8/21 Added supine cane flex and ER.  Provided with handouts    Manual Treatments:  PROM/MOBS to the left shoulder joint complex with TPR/MFR to related hypertonicity      Modalities: Gameready to L shoulder in sitting, low pressure, 34 deg x10' for pain management. No adverse effects. Communication with other providers:  POC sent 6/7/21       Assessment:  Pt continues to progress with his ROM - still not WNL's, but improving each visit. Pt presented a little more tight than expected, he had been out of town for almost a week and was not fully compliant with HEP. Pt instructed with all listed interventions and tolerates all well - except he continues to trouble with ER AROM. He will continue to benefit from skilled PT services in order to progress ROM and strength for return to PLOF. End session pain: 0/10      Plan for Next Session:  review HEP, manual PROM into flex/scaption to  deg, elbow ext, scap strengthening, light isometrics into ext/ER/flex (No IR) , gameready.  SEE PROTOCOL      Time In / Time Out:   1030/1120      Timed Code/Total Treatment Minutes:  MAN X 25' X 2;   TE X 15' X 1;    GR X 10' X 1      Next Progress Note due:  Eval 6/7/21  Visit 10       Plan of Care Interventions:  [x] Therapeutic Exercise  [x] Modalities:  [x] Therapeutic Activity     [] Ultrasound  [x] Estim  [] Gait Training      [] Cervical Traction [] Lumbar Traction  [x] Neuromuscular Re-education    [x] Cold/hotpack [] Iontophoresis   [x] Instruction in HEP      [x] Vasopneumatic   [] Dry Needling    [x] Manual Therapy               [] Aquatic Therapy              Electronically signed by:  Giovanny Dunn II, PTA [id number]             7:31 AM   7/16/2021

## 2021-07-20 ENCOUNTER — HOSPITAL ENCOUNTER (OUTPATIENT)
Dept: PHYSICAL THERAPY | Age: 76
Setting detail: THERAPIES SERIES
Discharge: HOME | End: 2021-07-20
Payer: MEDICARE

## 2021-07-20 PROCEDURE — 97110 THERAPEUTIC EXERCISES: CPT

## 2021-07-20 PROCEDURE — 97016 VASOPNEUMATIC DEVICE THERAPY: CPT

## 2021-07-20 PROCEDURE — 97140 MANUAL THERAPY 1/> REGIONS: CPT

## 2021-07-20 NOTE — FLOWSHEET NOTE
Outpatient Physical Therapy  [place]           [x] Phone: [phone]   Fax: [phone]  [place]           [] Phone: [phone]   Fax: [phone]        Physical Therapy Daily Treatment Note  Date:  2021    Patient Name:  Brenda Salter    :  1945  MRN: [id number]  Restrictions/Precautions:  No ER past neutral, no active IR, sling till 6 weeks   Diagnosis:  L Reverse Total Shoulder Replacement     Date of Injury/Surgery: 21  Treatment Diagnosis:    L UE stiffness, weakness, pain   Insurance/Certification information: [facility] Medicare     Referring Physician:  James Díaz PA-C   Next Doctor Visit:    Plan of care signed (Y/N): y    Outcome Measure: Quick DASH: 77% disability   Visit# / total visits:   per POC  Pain level: 4/04 with  certain activities, 2/65 at rest    Goals:         Patient goals : return to full L UE function  Short term goals  Time Frame for Short term goals: Defer to Long Term Goals  Short term goal 1: 5 weeks 21  Short term goal 2: Pt will demo >75% full PROM with flex/ABD to ease reaching. Progressing   Short term goal 3: Pt will discharge sling appropriately. Met   Short term goal 4: Pt will report <55% disability per Quick DASH. 21 Quick Dash Score = 50%  Long term goals  Time Frame for Long term goals : 10 weeks 21  Long term goal 1: Pt will demo I with HEP/symptom management. Reports partial compliance   Long term goal 2: Pt will demo AROM within 10 deg of R UE to ease ADLs. Long term goal 3: Pt will demo Good strength in neutral and able to lift 3# overhead to ease overhead reaching. Long term goal 4: Pt will report <35% Quick DASH to demo improved function. 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 Summary of Evaluation: Pt is 76year old male s/p L Reverse TSA 21. Pt now has difficulties completing all ADLS with use of L UE and sleeping.  Pt demo deficits this date that include L UE PROM, scap/deltoid strength in neutral, elow ext ROM and pain. Pt will benefit with PT services with progression of strength/ROM, manual and modalities per protocol to return to PLOF. Pt prior to onset of current condition had min/no pain with able to complete full ADLs and outdoor activities. Patient received education on their current pathology and how their condition effects them with their functional activities. Patient understood discussion and questions were answered. Patient understands their activity limitations and understands rational for treatment progression. Subjective:   Pt reports it hurts at time with activity. Reports doing HEP. Report hurting in the morning. Pt reports he averages his pain rating to 5/10. No pain at rest and mostly stiffness noted. Some sharp 8/10 with some activities. Any changes in Ambulatory Summary Sheet?   None      Objective:  COVID screening questions were asked and patient attested that there had been no contact or symptoms    PROM  Flexion 155°  Abduction 135°  ER 40°      Exercises: (No more than 4 columns)   Exercises  7/6/2021 7/8/21 #10 7/16/21  #11 7/20/21 #12       Week 10 in 2 days   Warm Up       Pulley's Flexion/Abduction   1x10 flex 2 x 10 flex           Table        AAROM supine cane press up 10* 10x 1# bar 1x15 2# bar 1x15   AAROM supine cane ER 10*5\" 10*5\"     AAROM supine cane flexion  10*5\" 10*5\" 1# bar 1x15 2# bar 1x15   Supine circles 10* ea dir  10* ea dir 1x15 cw/ccw - AROM 1x15 cw/ccw - AROM   Supine AA/punch  2x10 with min A     Supine AA/flex  2x10 with mod A     sidelying ER  2x10 with min A 1x15 - AROM 1x15 - AROM   ABC's at 90 deg flex   1 x thru - AROM 1 x w 1#   SA push 90 deg flex   1x15 - AROM X 15 AROM  X 10 w 1#           Manual therapy   See below See below          Standing       Shoulder iso's in all directions except for IR 10*5\"   manual x 15 3 ct   Scap squeezes  10*3\" 10*3\"                          Modalities Game Ready   10' 10'              Other Therapeutic Activities/Education: None   7/20/21 Reviewed precautions and weight restrictions and progress to be expect per protocol. Home Exercise Program:  7/6: issued isometrics for home program (NO IR)  7/8/21 Added supine cane flex and ER. Provided with handouts    Manual Treatments:  PROM/MOBS to the left shoulder joint complex with TPR/MFR to related hypertonicity      Modalities: Gameready to L shoulder in sitting, low pressure, 34 deg x10' for pain management. No adverse effects. Communication with other providers:  POC sent 6/7/21       Assessment:  Pt demonstrated GOOD tolerance to tx with some increase in ROM and increase in strength. Pt's AROM progressing well. Challenged by ER and stiffness. Report doing HEP. He will continue to benefit from skilled PT services in order to progress ROM and strength for return to PLOF. End session pain: 0/10      Plan for Next Session:  review HEP, manual PROM into flex/scaption to  deg, elbow ext, scap strengthening, light isometrics into ext/ER/flex (No IR) , gameready.  SEE PROTOCOL      Time In / Time Out:   1104/1204      Timed Code/Total Treatment Minutes:  50'/60'  30 TE 20' MT 10' vaso      Next Progress Note due:  Eval 6/7/21  Visit 10       Plan of Care Interventions:  [x] Therapeutic Exercise  [x] Modalities:  [x] Therapeutic Activity     [] Ultrasound  [x] Estim  [] Gait Training      [] Cervical Traction [] Lumbar Traction  [x] Neuromuscular Re-education    [x] Cold/hotpack [] Iontophoresis   [x] Instruction in HEP      [x] Vasopneumatic   [] Dry Needling    [x] Manual Therapy               [] Aquatic Therapy              Electronically signed by:  Jamin Hall, VLADIMIR II, PTA [id number]             11:06 AM   7/20/2021

## 2021-07-27 ENCOUNTER — HOSPITAL ENCOUNTER (OUTPATIENT)
Dept: PHYSICAL THERAPY | Age: 76
Discharge: HOME | End: 2021-07-27

## 2021-08-12 ENCOUNTER — HOSPITAL ENCOUNTER (OUTPATIENT)
Dept: PHYSICAL THERAPY | Age: 76
Setting detail: THERAPIES SERIES
Discharge: HOME | End: 2021-08-12
Payer: MEDICARE

## 2021-08-12 PROCEDURE — 97110 THERAPEUTIC EXERCISES: CPT

## 2021-08-12 PROCEDURE — 97530 THERAPEUTIC ACTIVITIES: CPT

## 2021-08-12 NOTE — PROGRESS NOTES
Outpatient Physical Therapy           [place]           [] Phone: [phone]   Fax: [phone]  [place]           [] Phone: [phone]   Fax: [phone]      To:   Nestor Ziegler PA-C       From: Bart Perales, PT, DPT, OCS     Patient: Kevin Henderson                     : 1945  Diagnosis:    L Reverse Total Shoulder Replacement    21  Date: 2021  Treatment Diagnosis:   L UE stiffness, weakness, pain       [x]  Progress Note                []  Discharge Note    Evaluation Date:   21  Total Visits to date:  15  Cancels/No-shows to date:1    Subjective:     Pt reports it hurts at time with activity with 5/10 on average. Increases to 7-8/10 with donning belt. Reports doing HEP. Report hurting in the morning. Feels ROM/strength is improving. Feels like he is able to do more without pain. Plan of Care/Treatment to date:  [x] Therapeutic Exercise    [] Modalities:  [x] Therapeutic Activity     [] Ultrasound  [x] Electrical Stimulation  [] Gait Training      [] Cervical Traction   [] Lumbar Traction  [x] Neuromuscular Re-education  [x] Cold/hotpack [] Iontophoresis  [x] Instruction in HEP      Other:  [] Manual Therapy       [x]  Vasopneumatic  [] Aquatic Therapy       []   Dry Needle Therapy                      Objective/Significant Findings At Last Visit/Comments:      No guarding with normal shoulder movement with ambulation  Min tenderness at incision.       L UE A/PROM:  Flexion 113/150°  Abduction 89/160°  ER: functionally to C4: 74°   IR: functionally to PSIS, 70°     Fair (+) strength within available motion  Quick DASH: 32% disability       Assessment:   Pt completed 13 total visits since eval on 21. Min pain with active ROM with shoulder with strength deficit contributing to ROM at mod height. Progressing well with P/AAROM at this time with progression continuing targeting shoulder/scapular region at this time.  Anticipate continued improvement in strength to ease

## 2021-08-12 NOTE — FLOWSHEET NOTE
Outpatient Physical Therapy  [place]           [x] Phone: [phone]   Fax: [phone]  [place]           [] Phone: [phone]   Fax: [phone]        Physical Therapy Daily Treatment Note  Date:  2021    Patient Name:  Lizet Monk    :  1945  MRN: [id number]  Restrictions/Precautions:  --  Diagnosis:  L Reverse Total Shoulder Replacement     Date of Injury/Surgery: 21  Treatment Diagnosis:    L UE stiffness, weakness, pain   Insurance/Certification information: [employer]     Referring Physician:  Blanca Cotto PA-C   Next Doctor Visit:    Plan of care signed (Y/N): y    Outcome Measure: Quick DASH: 32% disability   Visit# / total visits:   per POC  Pain level:  0/73 with  certain activities,  0/10 at rest    Goals:         Patient goals : return to full L UE function  Short term goals  Time Frame for Short term goals: Defer to Long Term Goals  Short term goal 1: 5 weeks 21  Short term goal 2: Pt will demo >75% full PROM with flex/ABD to ease reaching. MET  Short term goal 3: Pt will discharge sling appropriately. Met   Short term goal 4: Pt will report <55% disability per Quick DASH. MET  Long term goals  Time Frame for Long term goals : 10 weeks 21  Long term goal 1: Pt will demo I with HEP/symptom management. Reports partial compliance   Long term goal 2: Pt will demo AROM within 10 deg of R UE to ease ADLs. Partially MET   Long term goal 3: Pt will demo Good strength in neutral and able to lift 3# overhead to ease overhead reaching. Not MET   Long term goal 4: Pt will report <35% Quick DASH to demo improved function. 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 Partially MET       Summary of Evaluation: Pt is 76year old male s/p L Reverse TSA 21. Pt now has difficulties completing all ADLS with use of L UE and sleeping.  Pt demo deficits this date that include L UE PROM, scap/deltoid strength in neutral, elow ext ROM and pain. Pt will benefit with PT services with progression of strength/ROM, manual and modalities per protocol to return to PLOF. Pt prior to onset of current condition had min/no pain with able to complete full ADLs and outdoor activities. Patient received education on their current pathology and how their condition effects them with their functional activities. Patient understood discussion and questions were answered. Patient understands their activity limitations and understands rational for treatment progression. Subjective:   Pt reports it hurts at time with activity with 5/10 on average. Increases to 7-8/10 with donning belt. Reports doing HEP. Report hurting in the morning. Feels ROM/strength is improving. Feels like he is able to do more without pain. Any changes in Ambulatory Summary Sheet?   None      Objective:  COVID screening questions were asked and patient attested that there had been no contact or symptoms        No guarding with normal shoulder movement with ambulation  Min tenderness at incision.       L UE A/PROM:  Flexion 113/150°  Abduction 89/160°  ER: functionally to C4: 74°   IR: functionally to PSIS, 70°    Fair (+) strength within available motion  Quick DASH: 32% disability           Exercises: (No more than 4 columns)   Exercises  7/6/2021 7/8/21 #10 7/16/21  #11 7/20/21 #12 08/12/21  #13       Week 10 in 2 days Week 12   Warm Up        Pulley's Flexion/Abduction   1x10 flex 2 x 10 flex  2 x 10 flex            Table         AAROM flexion   10* 10x 1# bar 1x15 2# bar 1x15    AAROM supine cane ER 10*5\" 10*5\"      AAROM supine cane flexion  10*5\" 10*5\" 1# bar 1x15 2# bar 1x15    Supine circles 10* ea dir  10* ea dir 1x15 cw/ccw - AROM 1x15 cw/ccw - AROM 2# 10x2 each dir   Supine AA/punch  2x10 with min A   2# 10x2   Supine AA/flex  2x10 with mod A      sidelying ER/ABD  2x10 with min A 1x15 - AROM 1x15 - AROM 2# 10x2   Min assist to prevent compensation ABC's at 90 deg flex   1 x thru - AROM 1 x w 1#    SA push 90 deg flex   1x15 - AROM X 15 AROM  X 10 w 1#             Manual therapy   See below See below                                            Standing        Shoulder iso's in all directions except for IR 10*5\"   manual x 15 3 ct    Scap squeezes  10*3\" 10*3\"      Mid Rows      GTB 10x2   Ball taps OH     10x2   Ball on wall OH     10x2 each dir    scaption      2# 10x2 to 90 deg                            Modalities        Game Ready   10' 10'                Other Therapeutic Activities/Education: None   7/20/21 Reviewed precautions and weight restrictions and progress to be expect per protocol. Home Exercise Program:  7/6: issued isometrics for home program (NO IR)  7/8/21 Added supine cane flex and ER. Provided with handouts    Manual Treatments:        Modalities: . Communication with other providers:  POC sent 6/7/21      PN sent 8/12/21       Assessment:   Pt completed 13 total visits since eval on 6/7/21. Min pain with active ROM with shoulder with strength deficit contributing to ROM at mod height. Progressing well with P/AAROM at this time with progression continuing targeting shoulder/scapular region at this time. Anticipate continued improvement in strength to ease use of shoulder with ADLs and further mitigate pain. Will continue progression to maximzie function in therapy per protocol. Pt agreed to this plan        End session pain: 0/10      Plan for Next Session:  manual PROM to end ranges, elbow ext, scap strengthening, light isometrics into ext/ER/flex , gameready.  SEE PROTOCOL      Time In / Time Out:   9192-5994      Timed Code/Total Treatment Minutes:  45/45'    33'TE   12' TA       Next Progress Note due:  Eval 6/7/21    PN sent 8/12/21      Plan of Care Interventions:  [x] Therapeutic Exercise  [x] Modalities:  [x] Therapeutic Activity     [] Ultrasound  [x] Estim  [] Gait Training      [] Cervical Traction [] Lumbar Traction  [x] Neuromuscular Re-education    [x] Cold/hotpack [] Iontophoresis   [x] Instruction in HEP      [x] Vasopneumatic   [] Dry Needling    [x] Manual Therapy               [] Aquatic Therapy              Electronically signed by:  Tam Turner PT , DPT, OCS    8/12/2021 12:46 PM

## 2021-08-16 ENCOUNTER — HOSPITAL ENCOUNTER (OUTPATIENT)
Age: 76
Discharge: HOME | End: 2021-08-16
Payer: MEDICARE

## 2021-08-16 LAB
INR BLD: 3.6 INDEX
PROTHROMBIN TIME: 47.1 SECONDS (ref 11.7–14.5)

## 2021-08-16 PROCEDURE — 84154 ASSAY OF PSA FREE: CPT

## 2021-08-16 PROCEDURE — 36415 COLL VENOUS BLD VENIPUNCTURE: CPT

## 2021-08-16 PROCEDURE — 85610 PROTHROMBIN TIME: CPT

## 2021-08-16 PROCEDURE — 84153 ASSAY OF PSA TOTAL: CPT

## 2021-08-19 ENCOUNTER — HOSPITAL ENCOUNTER (OUTPATIENT)
Dept: PHYSICAL THERAPY | Age: 76
Setting detail: THERAPIES SERIES
Discharge: HOME | End: 2021-08-19
Payer: MEDICARE

## 2021-08-19 LAB
PROSTATE SPECIFIC ANTIGEN FREE: 0.6 NG/ML
PROSTATE SPECIFIC ANTIGEN PERCENT FREE: 10 %
PROSTATE SPECIFIC ANTIGEN: 6.3 NG/ML (ref 0–4)

## 2021-08-19 PROCEDURE — 97110 THERAPEUTIC EXERCISES: CPT

## 2021-08-19 PROCEDURE — 97530 THERAPEUTIC ACTIVITIES: CPT

## 2021-08-19 NOTE — FLOWSHEET NOTE
Outpatient Physical Therapy  [place]           [x] Phone: [phone]   Fax: [phone]  [place]           [] Phone: [phone]   Fax: [phone]        Physical Therapy Daily Treatment Note  Date:  2021    Patient Name:  Benny Fernandes    :  1945  MRN: [id number]  Restrictions/Precautions:  --  Diagnosis:  L Reverse Total Shoulder Replacement     Date of Injury/Surgery: 21  Treatment Diagnosis:    L UE stiffness, weakness, pain   Insurance/Certification information:  [facility] Medicare     Referring Physician:  Dale Pinto PA-C   Next Doctor Visit:    Plan of care signed (Y/N): y    Outcome Measure: Quick DASH: 32% disability   Visit# / total visits:   per POC  Pain level:  5/10 in the morning with  certain activities,  0/10 at rest     Goals:         Patient goals : return to full L UE function  Short term goals  Time Frame for Short term goals: Defer to Long Term Goals  Short term goal 1: 5 weeks 21  Short term goal 2: Pt will demo >75% full PROM with flex/ABD to ease reaching. MET  Short term goal 3: Pt will discharge sling appropriately. Met   Short term goal 4: Pt will report <55% disability per Quick DASH. MET  Long term goals  Time Frame for Long term goals : 10 weeks 21  Long term goal 1: Pt will demo I with HEP/symptom management. Reports partial compliance   Long term goal 2: Pt will demo AROM within 10 deg of R UE to ease ADLs. Partially MET   Long term goal 3: Pt will demo Good strength in neutral and able to lift 3# overhead to ease overhead reaching. Not MET   Long term goal 4: Pt will report <35% Quick DASH to demo improved function. 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 Partially MET       Summary of Evaluation: Pt is 76year old male s/p L Reverse TSA 21. Pt now has difficulties completing all ADLS with use of L UE and sleeping.  Pt demo deficits this date that include L UE PROM, scap/deltoid strength in neutral, elow ext ROM and pain. Pt will benefit with PT services with progression of strength/ROM, manual and modalities per protocol to return to PLOF. Pt prior to onset of current condition had min/no pain with able to complete full ADLs and outdoor activities. Patient received education on their current pathology and how their condition effects them with their functional activities. Patient understood discussion and questions were answered. Patient understands their activity limitations and understands rational for treatment progression. Subjective:   Pt reports it hurts at time with activity with 5/10 on average. Reports doing HEP. Report soreness in the morning such as when you work out. Any changes in Ambulatory Summary Sheet? None      Objective:  COVID screening questions were asked and patient attested that there had been no contact or symptoms    Normal arm swing with ambulation   Min tenderness at incision.     Tight pectoral at insertion     L UE A/PROM:  Flexion 113/150°  Abduction 89/160°  ER: functionally to C4: 74°  IR: functionally to PSIS, 70°      Exercises: (No more than 4 columns)   Exercises  7/16/21  #11 7/20/21 #12 08/12/21  #13 8/19/21 #14     Week 10 in 2 days Week 12    Warm Up       Pulley's Flexion/Abduction 1x10 flex 2 x 10 flex  2 x 10 flex  2 x 10 flex           Table        AAROM flexion   1# bar 1x15 2# bar 1x15 2# bar 1x15 2# bar 1x15   AAROM supine cane ER       AAROM supine cane flexion  1# bar 1x15 2# bar 1x15     Supine circles 1x15 cw/ccw - AROM 1x15 cw/ccw - AROM 2# 10x2 each dir 2# 10x2 each dir   Supine AA/punch   2# 10x2 2# 10x2    Supine AA/flex       sidelying ER/ABD 1x15 - AROM 1x15 - AROM 2# 10x2   Min assist to prevent compensation  2# 10x2   Min assist to prevent compensation   ABC's at 90 deg flex 1 x thru - AROM 1 x w 1#  1 x w 1#   SA push 90 deg flex 1x15 - AROM X 15 AROM  X 10 w 1#   Incorporated with the punch  2# 10x2          Manual therapy See below See below                                        Standing       Shoulder iso's in all directions except for IR  manual x 15 3 ct     Scap squeezes        Mid Rows    GTB 10x2 GTB 10x2   Ball taps OH   10x2 10x2   Ball on wall OH   10x2 each dir  10x2 each dir    scaption    2# 10x2 to 90 deg  2# 10x2 to 90 deg                        Modalities       Game Ready 10' 10'  declined              Other Therapeutic Activities/Education: None   7/20/21 Reviewed precautions and weight restrictions and progress to be expect per protocol. Home Exercise Program:  7/6: issued isometrics for home program (NO IR)  7/8/21 Added supine cane flex and ER. Provided with handouts    Manual Treatments: none      Modalities:none       Communication with other providers:  POC sent 6/7/21      PN sent 8/12/21       Assessment:   Pt demonstrated GOOD tolerance to tx. Noted IR stiffness and flexion ROM restriction. Difficulty with ER strengthening exercises. Pt would continue to benefit from skilled therapy interventions to address remaining impairments, improve mobility and strength and progress toward goal completion while reducing risk for re-injury or further decline. End session pain: 0/10 5/10 with mvt. Same as arrival      Plan for Next Session:  manual PROM to end ranges, elbow ext, scap strengthening, light isometrics into ext/ER/flex , gameready.  SEE PROTOCOL      Time In / Time Out:   3965-8694      Timed Code/Total Treatment Minutes:  40/40'    30'TE   10' TA       Next Progress Note due:  Eval 6/7/21    PN sent 8/12/21      Plan of Care Interventions:  [x] Therapeutic Exercise  [x] Modalities:  [x] Therapeutic Activity     [] Ultrasound  [x] Estim  [] Gait Training      [] Cervical Traction [] Lumbar Traction  [x] Neuromuscular Re-education    [x] Cold/hotpack [] Iontophoresis   [x] Instruction in HEP      [x] Vasopneumatic   [] Dry Needling    [x] Manual Therapy               [] Aquatic Therapy Electronically signed by:  Michael Ha PTA, CLT 8/19/2021 8:51 AM

## 2021-08-27 ENCOUNTER — HOSPITAL ENCOUNTER (OUTPATIENT)
Dept: PHYSICAL THERAPY | Age: 76
Setting detail: THERAPIES SERIES
Discharge: HOME | End: 2021-08-27
Payer: MEDICARE

## 2021-08-27 PROCEDURE — 97112 NEUROMUSCULAR REEDUCATION: CPT

## 2021-08-27 PROCEDURE — 97110 THERAPEUTIC EXERCISES: CPT

## 2021-08-27 NOTE — FLOWSHEET NOTE
strength in neutral, elow ext ROM and pain. Pt will benefit with PT services with progression of strength/ROM, manual and modalities per protocol to return to PLOF. Pt prior to onset of current condition had min/no pain with able to complete full ADLs and outdoor activities. Patient received education on their current pathology and how their condition effects them with their functional activities. Patient understood discussion and questions were answered. Patient understands their activity limitations and understands rational for treatment progression. Subjective:   Pt reports he was not 100% compliant with his HEP while he was out of town, feels like he is a little behind now. Any changes in Ambulatory Summary Sheet? None      Objective:  COVID screening questions were asked and patient attested that there had been no contact or symptoms    Normal arm swing with ambulation   Min tenderness at incision.     Tight pectoral at insertion     L UE A/PROM:  Flexion 113/150°  Abduction 89/160°  ER: functionally to C4: 74°  IR: functionally to PSIS, 70°      Exercises: (No more than 4 columns)   Exercises  7/20/21 #12 08/12/21  #13 8/19/21 #14 8/27/21   #15    Week 10 in 2 days Week 12     Warm Up       Pulley's Flexion/Abduction 2 x 10 flex  2 x 10 flex  2 x 10 flex  20 x each          Table        AAROM flexion   2# bar 1x15 2# bar 1x15 2# bar 1x15 2# 2x10   AAROM supine cane ER       AAROM supine cane flexion  2# bar 1x15      Supine circles 1x15 cw/ccw - AROM 2# 10x2 each dir 2# 10x2 each dir 2# 1x20 cw/ccw   Supine AA/punch  2# 10x2 2# 10x2     Supine AA/flex       sidelying ER/ABD 1x15 - AROM 2# 10x2   Min assist to prevent compensation  2# 10x2   Min assist to prevent compensation    ABC's at 90 deg flex 1 x w 1#  1 x w 1# 2# 1x thru   SA push 90 deg flex X 15 AROM  X 10 w 1#   Incorporated with the punch  2# 10x2 2# 1x20          Manual therapy See below                                         Standing Shoulder iso's in all directions except for IR manual x 15 3 ct      Scap squeezes        Mid Rows   GTB 10x2 GTB 10x2 BTB 2x10   B ext    BTB 2x10   Ball taps OH  10x2 10x2    Ball on wall OH  10x2 each dir  10x2 each dir     scaption   2# 10x2 to 90 deg  2# 10x2 to 90 deg    3D matrix    2# 1x8 R/L each   V combo    2# db 2x8   OH cross punch    1# db's 1x8 R/L each   OH halo    1# db 1x10 cw/ccw   B hor ABD    YTB 1x10   Isometric adduction    5 x 5\" holds   Bicep curls    2# 1x12          Modalities       Game Ready 10'  declined               Other Therapeutic Activities/Education: None   7/20/21 Reviewed precautions and weight restrictions and progress to be expect per protocol. Home Exercise Program:  7/6: issued isometrics for home program (NO IR)  7/8/21 Added supine cane flex and ER. Provided with handouts    Manual Treatments: none      Modalities:none       Communication with other providers:  POC sent 6/7/21      PN sent 8/12/21       Assessment:   Pt is able to tolerate listed interventions today, but is definitely challenged overall. Added shoulder adduction isometrics to HEP as pt continues to have difficulty with stabilizing elbow position for ER strengthening exercises. Pt would continue to benefit from skilled therapy interventions to address remaining impairments, improve mobility and strength and progress toward goal completion while reducing risk for re-injury or further decline. End session pain: 0/10       Plan for Next Session:  manual PROM to end ranges, elbow ext, scap strengthening, light isometrics into ext/ER/flex , gameready.  SEE PROTOCOL      Time In / Time Out:   1017/1055      Timed Code/Total Treatment Minutes:  TE X 30' X 2;   NR X 8' X 1      Next Progress Note due:  Eval 6/7/21    PN sent 8/12/21      Plan of Care Interventions:  [x] Therapeutic Exercise  [x] Modalities:  [x] Therapeutic Activity     [] Ultrasound  [x] Estim  [] Gait Training      [] Cervical Traction [] Lumbar Traction  [x] Neuromuscular Re-education    [x] Cold/hotpack [] Iontophoresis   [x] Instruction in HEP      [x] Vasopneumatic   [] Dry Needling    [x] Manual Therapy               [] Aquatic Therapy              Electronically signed by:  Mikey Tinoco II, PTA [id number]     8/27/2021 7:14 AM

## 2021-09-03 ENCOUNTER — HOSPITAL ENCOUNTER (OUTPATIENT)
Dept: PHYSICAL THERAPY | Age: 76
Setting detail: THERAPIES SERIES
Discharge: HOME | End: 2021-09-03
Payer: MEDICARE

## 2021-09-03 PROCEDURE — 97110 THERAPEUTIC EXERCISES: CPT

## 2021-09-03 NOTE — FLOWSHEET NOTE
Outpatient Physical Therapy  [place]           [x] Phone: [phone]   Fax: [phone]  [place]           [] Phone: [phone]   Fax: [phone]        Physical Therapy Daily Treatment Note  Date:  9/3/2021    Patient Name:  Nikita Hoyos    :  1945  MRN: [id number]  Restrictions/Precautions:  --  Diagnosis:  L Reverse Total Shoulder Replacement     Date of Injury/Surgery: 21  Treatment Diagnosis:    L UE stiffness, weakness, pain   Insurance/Certification information: [employer]     Referring Physician:  Neha Malone PA-C   Next Doctor Visit:  21  Plan of care signed (Y/N): y    Outcome Measure: Quick DASH: 32% disability   Visit# / total visits:  15/20 per POC  Pain level:  5/10 in the morning with certain activities, but  0/10 at rest     Goals:         Patient goals : return to full L UE function  Short term goals  Time Frame for Short term goals: Defer to Long Term Goals  Short term goal 1: 5 weeks 21  Short term goal 2: Pt will demo >75% full PROM with flex/ABD to ease reaching. MET  Short term goal 3: Pt will discharge sling appropriately. Met   Short term goal 4: Pt will report <55% disability per Quick DASH. MET  Long term goals  Time Frame for Long term goals : 10 weeks 21  Long term goal 1: Pt will demo I with HEP/symptom management. MET  Long term goal 2: Pt will demo AROM within 10 deg of R UE to ease ADLs. Mostly MET   Long term goal 3: Pt will demo Good strength in neutral and able to lift 3# overhead to ease overhead reaching. MET   Long term goal 4: Pt will report <35% Quick DASH to demo improved function. 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 MET       Summary of Evaluation: Pt is 76year old male s/p L Reverse TSA 21. Pt now has difficulties completing all ADLS with use of L UE and sleeping.  Pt demo deficits this date that include L UE PROM, scap/deltoid strength in neutral, elow ext ROM and pain. Pt will benefit with PT services with progression of strength/ROM, manual and modalities per protocol to return to PLOF. Pt prior to onset of current condition had min/no pain with able to complete full ADLs and outdoor activities. Patient received education on their current pathology and how their condition effects them with their functional activities. Patient understood discussion and questions were answered. Patient understands their activity limitations and understands rational for treatment progression. Subjective:   Pt reports no pain currently. Worst this morning at 5/10. No problems after last visit. Does feel like he is getting stronger. Any changes in Ambulatory Summary Sheet? None      Objective:  COVID screening questions were asked and patient attested that there had been no contact or symptoms    Normal arm swing with ambulation   Reviewed HEP with good technique. Most challenged with compensation into ABD with passive or resisted ER.    Issued HO.     L UE A/PROM:  Flexion 113/150°  Abduction 89/160°  ER: functionally to C4: 74°, limited to 10 deg in neutral   IR: functionally to PSIS, 70°      Exercises: (No more than 4 columns)   Exercises  8/19/21 #14 8/27/21   #15 9/3/21  #16         Warm Up      Pulley's Flexion/Abduction 2 x 10 flex  20 x each    UBE   1.5' each dir    Table       AAROM flexion   2# bar 1x15 2# 2x10    AAROM supine cane ER   Against doorway standing 10x2  3\"   AAROM supine cane flexion       Supine circles 2# 10x2 each dir 2# 1x20 cw/ccw    Supine AA/punch 2# 10x2      Supine AA/flex      sidelying ER/ABD 2# 10x2   Min assist to prevent compensation     ABC's at 90 deg flex 1 x w 1# 2# 1x thru    SA push 90 deg flex [employer] with the punch  2# 10x2 2# 1x20          Manual therapy      AAROM shoulder ABd with cane    10x2                            Standing      Shoulder iso's in all directions except for IR      Scap squeezes       Mid Rows  GTB 10x2 BTB 2x10 Discuss with RTB   B ext  BTB 2x10    Ball taps OH 10x2     Ball on wall OH 10x2 each dir      scaption  2# 10x2 to 90 deg     3D matrix  2# 1x8 R/L each    V combo  2# db 2x8    OH cross punch  1# db's 1x8 R/L each    OH halo  1# db 1x10 cw/ccw    B hor ABD  YTB 1x10    Isometric adduction  5 x 5\" holds    Bicep curls  2# 1x12    Resisted scaption    YTB 10x2 or 3# DB at home   Resisted ER against doorway   YTB 10x2 within available motion    Wall slides    10x2  3\"               Modalities      Game Ready declined  Declined              Other Therapeutic Activities/Education: None   7/20/21 Reviewed precautions and weight restrictions and progress to be expect per protocol. Home Exercise Program:  7/6: issued isometrics for home program (NO IR)  7/8/21 Added supine cane flex and ER. Provided with handouts  Issued no HO with exercises completed this date on 9/3/21. Manual Treatments: none      Modalities:none       Communication with other providers:  POC sent 6/7/21      PN sent 8/12/21       Assessment:   Pt is able to tolerate listed interventions today with issued HO to continue for home program. Pt came in with numerous sheets of exercises and reduced to 3 appropriate stretching and strengthening at this time. Will continue for the next week until next follow up. Antic bower being placed on hold after next visit to continue with home program. Will discharge after 30 days if no follow up is completed. Pt agreed to this plan. Pt would continue to benefit from skilled therapy interventions to address remaining impairments, improve mobility and strength and progress toward goal completion while reducing risk for re-injury or further decline. End session pain: 0/10       Plan for Next Session:  manual PROM to end ranges, elbow ext, scap strengthening, light isometrics into ext/ER/flex , gameready.  SEE PROTOCOL      Time In / Time Out:   [id number]      Timed Code/Total Treatment Minutes: 43/36'   TE X 40' X 3     Next Progress Note due:  Eval 6/7/21    PN sent 8/12/21      Plan of Care Interventions:  [x] Therapeutic Exercise  [x] Modalities:  [x] Therapeutic Activity     [] Ultrasound  [x] Estim  [] Gait Training      [] Cervical Traction [] Lumbar Traction  [x] Neuromuscular Re-education    [x] Cold/hotpack [] Iontophoresis   [x] Instruction in HEP      [x] Vasopneumatic   [] Dry Needling    [x] Manual Therapy               [] Aquatic Therapy               Electronically signed by:  Judy Kwon, PT DPT, OCS    9/3/2021 7:37 AM

## 2021-09-10 ENCOUNTER — HOSPITAL ENCOUNTER (OUTPATIENT)
Dept: PHYSICAL THERAPY | Age: 76
Setting detail: THERAPIES SERIES
Discharge: HOME | End: 2021-09-10
Payer: MEDICARE

## 2021-09-10 PROCEDURE — 97140 MANUAL THERAPY 1/> REGIONS: CPT

## 2021-09-10 PROCEDURE — 97110 THERAPEUTIC EXERCISES: CPT

## 2021-09-10 NOTE — FLOWSHEET NOTE
Outpatient Physical Therapy  [place]           [x] Phone: [phone]   Fax: [phone]  [place]           [] Phone: [phone]   Fax: [phone]        Physical Therapy Daily Treatment Note  Date:  9/10/2021    Patient Name:  Liberty Mays    :  1945  MRN: [id number]  Restrictions/Precautions:  --  Diagnosis:  L Reverse Total Shoulder Replacement     Date of Injury/Surgery: 21  Treatment Diagnosis:    L UE stiffness, weakness, pain   Insurance/Certification information: [employer]     Referring Physician:  Mel Cronin PA-C   Next Doctor Visit:  21  Plan of care signed (Y/N): y    Outcome Measure: Quick DASH: 32% disability   Visit# / total visits:   per POC  Pain level:  10    Goals:         Patient goals : return to full L UE function  Short term goals  Time Frame for Short term goals: Defer to Long Term Goals  Short term goal 1: 5 weeks 21  Short term goal 2: Pt will demo >75% full PROM with flex/ABD to ease reaching. MET  Short term goal 3: Pt will discharge sling appropriately. Met   Short term goal 4: Pt will report <55% disability per Quick DASH. MET  Long term goals  Time Frame for Long term goals : 10 weeks 21  Long term goal 1: Pt will demo I with HEP/symptom management. MET  Long term goal 2: Pt will demo AROM within 10 deg of R UE to ease ADLs. Mostly MET   Long term goal 3: Pt will demo Good strength in neutral and able to lift 3# overhead to ease overhead reaching. MET   Long term goal 4: Pt will report <35% Quick DASH to demo improved function. 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 MET       Summary of Evaluation: Pt is 76year old male s/p L Reverse TSA 21. Pt now has difficulties completing all ADLS with use of L UE and sleeping. Pt demo deficits this date that include L UE PROM, scap/deltoid strength in neutral, elow ext ROM and pain.  Pt will benefit with PT services with progression of strength/ROM, manual and modalities per protocol to return to PLOF. Pt prior to onset of current condition had min/no pain with able to complete full ADLs and outdoor activities. Patient received education on their current pathology and how their condition effects them with their functional activities. Patient understood discussion and questions were answered. Patient understands their activity limitations and understands rational for treatment progression. Subjective:   Pt reports he is starting the day with about 4/10 pain level. Pt reports his wife is in the hospital, he left there to come here and is headed back there after PT. Any changes in Ambulatory Summary Sheet? None      Objective:  COVID screening questions were asked and patient attested that there had been no contact or symptoms    Normal arm swing with ambulation   Reviewed HEP with good technique. Most challenged with compensation into ABD with passive or resisted ER.    Issued HO.     L UE A/PROM:  Flexion 113/152°  Abduction 89/160°  ER: functionally to C4: 74°, limited to 10 deg in neutral   IR: functionally to PSIS, 70°      Exercises: (No more than 4 columns)   Exercises  8/19/21 #14 8/27/21   #15 9/3/21  #16 9/10/21  #17          Warm Up       Pulley's Flexion/Abduction 2 x 10 flex  20 x each     UBE   1.5' each dir  2'/2'   Table        AAROM flexion   2# bar 1x15 2# 2x10     AAROM supine cane ER   Against doorway standing 10x2  3\"    AAROM supine cane flexion        Supine circles 2# 10x2 each dir 2# 1x20 cw/ccw     Supine AA/punch 2# 10x2       Supine AA/flex       sidelying ER/ABD 2# 10x2   Min assist to prevent compensation      ABC's at 90 deg flex 1 x w 1# 2# 1x thru     SA push 90 deg flex [employer] with the punch  2# 10x2 2# 1x20            Manual therapy    See below   AAROM shoulder ABd with cane    10x2                                 Standing       Shoulder iso's in all directions except for IR Scap squeezes        Mid Rows  GTB 10x2 BTB 2x10 Discuss with RTB BTB 2x10   B ext  BTB 2x10  BTB 2x10   Ball taps OH 10x2      Ball on wall OH 10x2 each dir       scaption  2# 10x2 to 90 deg      3D matrix  2# 1x8 R/L each     V combo  2# db 2x8     OH cross punch  1# db's 1x8 R/L each     OH halo  1# db 1x10 cw/ccw     B hor ABD  YTB 1x10     Isometric adduction  5 x 5\" holds     Bicep curls  2# 1x12     Resisted scaption    YTB 10x2 or 3# DB at home    Resisted ER against doorway   YTB 10x2 within available motion     Wall slides    10x2  3\"                  Modalities       Game Ready declined  Declined  declined              Other Therapeutic Activities/Education: None   7/20/21 Reviewed precautions and weight restrictions and progress to be expect per protocol. Home Exercise Program:  7/6: issued isometrics for home program (NO IR)  7/8/21 Added supine cane flex and ER. Provided with handouts  Issued no HO with exercises completed this date on 9/3/21. Manual Treatments: PROM/MOBS to the left shoulder joint complex with TPR/MFR to related hypertonicity      Modalities:none       Communication with other providers:  POC sent 6/7/21      PN sent 8/12/21       Assessment:   Pt requests manual interventions today as he most likely will not be coming back after today, but possibly, and just wants to make sure he is doing OK. Pt tolerates listed manual interventions well and has no increased complaints of pain. Pt to be placed on hold after this visit to continue with home program. Will discharge after 30 days if no follow up is completed. Pt has previously agreed to this plan. Pt would continue to benefit from skilled therapy interventions to address remaining impairments, improve mobility and strength and progress toward goal completion while reducing risk for re-injury or further decline.     End session pain: 0/10       Plan for Next Session:  manual PROM to end ranges, elbow ext, scap strengthening,

## 2021-10-26 ENCOUNTER — OFFICE VISIT (OUTPATIENT)
Dept: CARDIOLOGY CLINIC | Age: 76
End: 2021-10-26
Payer: MEDICARE

## 2021-10-26 VITALS
WEIGHT: 170 LBS | DIASTOLIC BLOOD PRESSURE: 88 MMHG | HEIGHT: 70 IN | BODY MASS INDEX: 24.34 KG/M2 | SYSTOLIC BLOOD PRESSURE: 132 MMHG | HEART RATE: 80 BPM

## 2021-10-26 DIAGNOSIS — E78.2 MIXED HYPERLIPIDEMIA: ICD-10-CM

## 2021-10-26 DIAGNOSIS — I10 ESSENTIAL HYPERTENSION: ICD-10-CM

## 2021-10-26 DIAGNOSIS — I73.9 PAD (PERIPHERAL ARTERY DISEASE) (HCC): Primary | ICD-10-CM

## 2021-10-26 PROCEDURE — G8420 CALC BMI NORM PARAMETERS: HCPCS | Performed by: NURSE PRACTITIONER

## 2021-10-26 PROCEDURE — G8484 FLU IMMUNIZE NO ADMIN: HCPCS | Performed by: NURSE PRACTITIONER

## 2021-10-26 PROCEDURE — G8427 DOCREV CUR MEDS BY ELIG CLIN: HCPCS | Performed by: NURSE PRACTITIONER

## 2021-10-26 PROCEDURE — 1123F ACP DISCUSS/DSCN MKR DOCD: CPT | Performed by: NURSE PRACTITIONER

## 2021-10-26 PROCEDURE — 4040F PNEUMOC VAC/ADMIN/RCVD: CPT | Performed by: NURSE PRACTITIONER

## 2021-10-26 PROCEDURE — 99214 OFFICE O/P EST MOD 30 MIN: CPT | Performed by: NURSE PRACTITIONER

## 2021-10-26 PROCEDURE — 1036F TOBACCO NON-USER: CPT | Performed by: NURSE PRACTITIONER

## 2021-10-26 ASSESSMENT — ENCOUNTER SYMPTOMS
EYES NEGATIVE: 1
GASTROINTESTINAL NEGATIVE: 1
RESPIRATORY NEGATIVE: 1

## 2021-10-26 NOTE — ASSESSMENT & PLAN NOTE
-New Order-femoral bypass in 1995, normal LY in 2019. Patient had developed multiple clots postop and is now on lifelong anticoagulation.

## 2021-10-26 NOTE — LETTER
[facility]    Dr. Hutchinson 621  1945  [id number]    Have you had any Chest Pain that is not new? - No       DO EKG IF: Patient has a Heart Rate above 100 or below 40     CAD (Coronary Artery Disease) patient should have one on file every 6 months        Have you had any Shortness of Breath - No      Have you had any dizziness - No       Sitting wait 5 minutes do supine (laying down) wait 5 minutes then do standing - log each in \"vitals\" area in Epic   Be sure to ask what symptoms they are having if they get dizzy while completing ortho stats such as: room spinning, nausea, etc.    Have you had any palpitations that are not new? - No      Is the patient on any of the following medications - No  If Yes DO EKG - Needs done every 6 months    Do you have any edema - swelling in No        Vein \"LEG PROBLEM Questionnaire\"  1. Do you have prominent leg veins? No   2. Do you have any skin discoloration? No  3. Do you have any healed or active sores? No  4. Do you have swelling of the legs? No  5. Do you have a family history of varicose veins? No  6. Does your profession involve pro-longed        standing or heavy lifting? No  7. Have you been fighting overweight problems? No  8. Do you have restless legs? No  9. Do you have any night time cramps? No  10. Do you have any of the following in your legs:        No     When did you have your last labs drawn 08/16/2021  Where did you have them done   What doctor ordered Vikas Arroyo    If we do not have these labs you are retrieve these labs for these providers!     Do you have a surgery or procedure scheduled in the near future - No     Ask patient if they want to sign up for [employer] if they are not already signed up     Check to see if we have an E-MAIL on file for the patient     Check medication list thoroughly!!! AND RECONCILE OUTSIDE MEDICATIONS  If dose has changed change the entire order not just the MG  BE SURE TO ASK PATIENT IF THEY NEED MEDICATION REFILLS     At check out add to every patient's \"wrap up\" the following dot phrase AFTERHOURSEDUCATION and ensure we explain this to our patients

## 2021-10-26 NOTE — PROGRESS NOTES
[facility] PHYSICAL [facility]  [id number]  Phone: [phone]    Fax [phone]                  Marti Mendenhall MD, Elio Baeza MD, Gilmar Claros MD, Cassell Brake, MD Abelino Cobb, MD Herschell Leventhal, MD Veronia Pintos, APRN      [facility], APRN  [facility], [facility], [facility]        Cardiology Progress Note      10/26/2021    RE: Todd Suazo  (1945)                             Primary cardiologist: Dr. Romel Allen       Subjective:  CC:   1. PAD (peripheral artery disease) ([facility] [address]    2. Essential hypertension    3. Mixed hyperlipidemia        HPI: Todd Suazo, who is a  68y.o. year old male with a past medical history as listed below. Patient presents to the office for follow up on PAD, HTN, and hyperlipidemia. Patient is  an active male who walks regularly. Patient is  compliant with medications. Patient denies any chest pain, shortness of breath, dizziness, syncope, or palpitations.     Past Medical History:   Diagnosis Date    Arthritis     CAD (coronary artery disease)     H/O cardiac catheterization 2013 or 2014    No stenting    History of blood clots 1995    History of echocardiogram 05/17/2019    EF 50-55%, Mild concentric left ventricular hypertrophy, sclerotic but non stenotic aortic valve, Mild thickening of anterior leaflet of mitral valve, Mild Pulm HTN    History of nuclear stress test 05/20/2019    Normal LVEF, normal tracer uptake in all myocardial segment during rest and stress, decreased uptake inferiorly due to diaphragmatic artifact    Hx of Arterial Doppler ultrasound 05/17/2019    Normal bilateral ABIs, Right distal SFA has 20-49% stenosis, diffuse plaquing noted throughout    Hx of blood clots     Hyperlipidemia     Hypertension     PAD (peripheral artery disease) ([facility] [address]     Venous malformation 11/2019       Current Outpatient Medications   Medication Sig Dispense Refill    nitroGLYCERIN (NITROSTAT) 0.4 MG SL tablet Place 1 tablet under the tongue every 5 minutes as needed for Chest pain 25 tablet 3    pravastatin (PRAVACHOL) 40 MG tablet Take 40 mg by mouth daily      ferrous sulfate 325 (65 Fe) MG tablet Take 325 mg by mouth daily (with breakfast)      NONFORMULARY 1 Dose every 3 months Pt gets Cortisone joint injection shots every 3 months in bilateral shoulders and Left hip, in [facility]. Sports Medicine. Dr. Seymour Ge Cholecalciferol (VITAMIN D3) 2000 units TABS Take 1 tablet by mouth every morning       Magnesium 400 MG CAPS Take 1 capsule by mouth every morning       diclofenac sodium (VOLTAREN) 1 % GEL Apply 2 g topically nightly as needed       pantoprazole (PROTONIX) 40 MG tablet Take 1 tablet by mouth every morning (before breakfast) 30 tablet 3    Aspirin Buf,JhMbw-JpHdg-GzMgu, (BUFFERED ASPIRIN) 325 MG TABS Take by mouth every morning       fenofibrate (TRICOR) 54 MG tablet Take 54 mg by mouth every morning [facility] pharmacy: Please dispense generic fenofibrate unless prescriber denote       ascorbic acid (GNP VITAMIN C) 500 MG tablet Take 500 mg by mouth every morning       metoprolol succinate (TOPROL XL) 50 MG extended release tablet Take 50 mg by mouth 2 times daily      vitamin E 400 UNIT capsule Take 400 Units by mouth every morning       Multiple Vitamins-Minerals (CENTRUM SILVER PO) Take 1 tablet by mouth every morning       warfarin (COUMADIN) 1 MG tablet Take 1 tablet by mouth every other day for 10 days (Patient taking differently: Take 4 mg by mouth every morning ) 30 tablet 0     No current facility-administered medications for this visit. Review of Systems:  Review of Systems   Constitutional: Negative. HENT: Negative. Eyes: Negative. Respiratory: Negative. Cardiovascular: Negative. Gastrointestinal: Negative. Genitourinary: Negative. Musculoskeletal: Positive for joint swelling. Neurological: Negative. Hematological: Negative. Psychiatric/Behavioral: Negative. All other systems reviewed and are negative. Objective:      Physical Exam:  /88   Pulse 80   Ht 5' 10\" (1.778 m)   Wt 170 lb (77.1 kg)   BMI 24.39 kg/m²   Wt Readings from Last 3 Encounters:   10/26/21 170 lb (77.1 kg)   04/12/21 181 lb (82.1 kg)   01/24/20 180 lb (81.6 kg)     Body mass index is 24.39 kg/m². Physical exam:  Physical Exam  Vitals reviewed. Constitutional:       Appearance: He is well-developed. HENT:      Head: Normocephalic. Eyes:      Pupils: Pupils are equal, round, and reactive to light. Cardiovascular:      Rate and Rhythm: Normal rate and regular rhythm. Pulses:           Dorsalis pedis pulses are 1+ on the right side and 1+ on the left side. Posterior tibial pulses are 1+ on the right side and 1+ on the left side. Heart sounds: Normal heart sounds. Pulmonary:      Effort: Pulmonary effort is normal.      Breath sounds: Normal breath sounds. Abdominal:      General: Bowel sounds are normal.      Palpations: Abdomen is soft. Musculoskeletal:         General: Normal range of motion. Cervical back: Normal range of motion and neck supple. Skin:     General: Skin is warm and dry. Neurological:      Mental Status: He is alert and oriented to person, place, and time.           DATA:  No results found for: CKTOTAL, CKMB, CKMBINDEX, TROPONINI  BNP:  No results found for: BNP  PT/INR:  No results found for: PTINR  No results found for: LABA1C  Lab Results   Component Value Date    CHOL 233 (H) 08/26/2020    TRIG 228 (H) 08/26/2020    HDL 54 08/26/2020    LDLDIRECT 143 (H) 08/26/2020     Lab Results   Component Value Date    ALT 8 (L) 10/31/2017    AST 14 (L) 10/31/2017     TSH:    Lab Results   Component Value Date    TSH 1.250 08/26/2019       Vitals:    10/26/21 1042   BP: 132/88   Pulse: 80       Echo:5/20/19  Left ventricular systolic function is normal with an ejection fraction of 50-55%. Mild concentric left ventricular hypertrophy. Sclerotic, but non-stenotic aortic valve. Mild thickening of anterior leaflet of mitral valve. Right ventricular systolic pressure of 41 mmHg consistent with mild pulmonary hypertension. No evidence of pericardial effusion. Stress Test:2019  No ischemia     LY screenin  normal     The 10-year ASCVD risk score (Bambi Acevedo., et al., 2013) is: 28%    Values used to calculate the score:      Age: 68 years      Sex: Male      Is Non- : No      Diabetic: No      Tobacco smoker: No      Systolic Blood Pressure: 368 mmHg      Is BP treated: No      HDL Cholesterol: 54 MG/DL      Total Cholesterol: 233 MG/DL      Assessment/ Plan:     PAD (peripheral artery disease) ([facility] [address]   -Aorta-femoral bypass in , normal LY in 2019. Patient had developed multiple clots postop and is now on lifelong anticoagulation. Essential hypertension   -Stable, continue with Toprol-XL 50 mg daily. Mixed hyperlipidemia   --At or near goal No, patient has had difficulty with     -He is to continue current medications (fenofibrate 54 mg, pravastatin 40 mg) Hepatic function panel WNL. No abdominal pain. No myalgias.     -The nature of cardiac risk has been fully discussed with this patient. I have made him aware of his LDL target goal given his cardiovascular risk analysis. I have discussed the appropriate diet. The need for lifelong compliance in order to reduce risk is stressed. A regular exercise program is recommended to help achieve and maintain normal body weight, fitness and improve lipid balance. A written copy of a low fat, low cholesterol diet has been given to the patient. Patient seen, interviewed and examined. Testing was reviewed. Patient is encouraged to exercise even a brisk walk for 30 minutes at least 3 to 4 times a week. Lifestyle and risk factor modificatons discussed.  Various goals are discussed and questions answered. Continue current medications. Appropriate prescriptions are addressed. Questions answered and patient verbalizes understanding. Call for any problems, questions, or concerns. Pt is to follow up in 12 months for Cardiac management    Electronically signed by Kaye Mckeon.  GERRI Ram CNP on 10/26/2021 at 11:56 AM

## 2022-02-09 ENCOUNTER — HOSPITAL ENCOUNTER (OUTPATIENT)
Age: 77
Discharge: HOME | End: 2022-02-09
Payer: MEDICARE

## 2022-02-09 LAB
INR BLD: 3.47 INDEX
PROSTATE SPECIFIC ANTIGEN: 5.38 NG/ML (ref 0–4)
PROTHROMBIN TIME: 45.3 SECONDS (ref 11.7–14.5)

## 2022-02-09 PROCEDURE — 36415 COLL VENOUS BLD VENIPUNCTURE: CPT

## 2022-02-09 PROCEDURE — 85610 PROTHROMBIN TIME: CPT

## 2022-02-09 PROCEDURE — G0103 PSA SCREENING: HCPCS

## 2022-03-19 ENCOUNTER — HOSPITAL ENCOUNTER (EMERGENCY)
Age: 77
Discharge: HOME | End: 2022-03-19
Payer: MEDICARE

## 2022-03-19 VITALS
WEIGHT: 170 LBS | HEIGHT: 70 IN | DIASTOLIC BLOOD PRESSURE: 84 MMHG | OXYGEN SATURATION: 99 % | TEMPERATURE: 97.7 F | SYSTOLIC BLOOD PRESSURE: 172 MMHG | BODY MASS INDEX: 24.34 KG/M2 | HEART RATE: 80 BPM | RESPIRATION RATE: 16 BRPM

## 2022-03-19 DIAGNOSIS — I10 PRIMARY HYPERTENSION: Primary | ICD-10-CM

## 2022-03-19 LAB
ANION GAP SERPL CALCULATED.3IONS-SCNC: 11 MMOL/L (ref 4–16)
BASOPHILS ABSOLUTE: 0 K/CU MM
BASOPHILS RELATIVE PERCENT: 0.6 % (ref 0–1)
BUN BLDV-MCNC: 15 MG/DL (ref 6–23)
CALCIUM SERPL-MCNC: 9.4 MG/DL (ref 8.3–10.6)
CHLORIDE BLD-SCNC: 102 MMOL/L (ref 99–110)
CO2: 27 MMOL/L (ref 21–32)
CREAT SERPL-MCNC: 1 MG/DL (ref 0.9–1.3)
DIFFERENTIAL TYPE: ABNORMAL
EOSINOPHILS ABSOLUTE: 0.2 K/CU MM
EOSINOPHILS RELATIVE PERCENT: 2.8 % (ref 0–3)
GFR AFRICAN AMERICAN: >60 ML/MIN/1.73M2
GFR NON-AFRICAN AMERICAN: >60 ML/MIN/1.73M2
GLUCOSE BLD-MCNC: 128 MG/DL (ref 70–99)
HCT VFR BLD CALC: 44.6 % (ref 42–52)
HEMOGLOBIN: 14.3 GM/DL (ref 13.5–18)
IMMATURE NEUTROPHIL %: 0.3 % (ref 0–0.43)
LYMPHOCYTES ABSOLUTE: 1.1 K/CU MM
LYMPHOCYTES RELATIVE PERCENT: 16.3 % (ref 24–44)
MCH RBC QN AUTO: 31.8 PG (ref 27–31)
MCHC RBC AUTO-ENTMCNC: 32.1 % (ref 32–36)
MCV RBC AUTO: 99.1 FL (ref 78–100)
MONOCYTES ABSOLUTE: 0.5 K/CU MM
MONOCYTES RELATIVE PERCENT: 7.4 % (ref 0–4)
NUCLEATED RBC %: 0 %
PDW BLD-RTO: 12.3 % (ref 11.7–14.9)
PLATELET # BLD: 424 K/CU MM (ref 140–440)
PMV BLD AUTO: 9.4 FL (ref 7.5–11.1)
POTASSIUM SERPL-SCNC: 4.4 MMOL/L (ref 3.5–5.1)
RBC # BLD: 4.5 M/CU MM (ref 4.6–6.2)
SEGMENTED NEUTROPHILS ABSOLUTE COUNT: 4.9 K/CU MM
SEGMENTED NEUTROPHILS RELATIVE PERCENT: 72.6 % (ref 36–66)
SODIUM BLD-SCNC: 140 MMOL/L (ref 135–145)
TOTAL IMMATURE NEUTOROPHIL: 0.02 K/CU MM
TOTAL NUCLEATED RBC: 0 K/CU MM
WBC # BLD: 6.8 K/CU MM (ref 4–10.5)

## 2022-03-19 PROCEDURE — 80048 BASIC METABOLIC PNL TOTAL CA: CPT

## 2022-03-19 PROCEDURE — 85025 COMPLETE CBC W/AUTO DIFF WBC: CPT

## 2022-03-19 PROCEDURE — 6370000000 HC RX 637 (ALT 250 FOR IP): Performed by: NURSE PRACTITIONER

## 2022-03-19 PROCEDURE — 99284 EMERGENCY DEPT VISIT MOD MDM: CPT

## 2022-03-19 RX ORDER — HYDROCHLOROTHIAZIDE 12.5 MG/1
12.5 TABLET ORAL DAILY
Status: DISCONTINUED | OUTPATIENT
Start: 2022-03-19 | End: 2022-03-19 | Stop reason: HOSPADM

## 2022-03-19 RX ORDER — HYDROCHLOROTHIAZIDE 12.5 MG/1
12.5 CAPSULE, GELATIN COATED ORAL EVERY MORNING
Qty: 15 CAPSULE | Refills: 0 | Status: SHIPPED | OUTPATIENT
Start: 2022-03-19 | End: 2022-10-13 | Stop reason: DRUGHIGH

## 2022-03-19 RX ADMIN — HYDROCHLOROTHIAZIDE 12.5 MG: 12.5 TABLET ORAL at 11:55

## 2022-03-19 NOTE — ED PROVIDER NOTES
EMERGENCY DEPARTMENT ENCOUNTER      PCP: Meryl Bonilla DO    CHIEF COMPLAINT    Chief Complaint   Patient presents with    Hypertension         HPI    Erick Damian is a 68 y.o. male who presents with c/o asymptomatic hypertension. He states he had an appointment with primary care provider last week and his blood pressure was elevated during that visit. His primary care provider told him to keep a record of his blood pressures and it has remained elevated. He states he called his primary care doctor who sent him here today for further evaluation. He denies any headache, vision changes, chest pain, shortness of breath, or other complaints. REVIEW OF SYSTEMS    Constitutional:  Denies fever, chills, weight loss or weakness   HENT:  Denies sore throat or ear pain   Cardiovascular:  Denies chest pain, palpitations.  HTN  Respiratory:  Denies cough or shortness of breath    GI:  Denies abdominal pain, nausea, vomiting, or diarrhea  :  Denies any urinary symptoms   Musculoskeletal:  Denies back pain  Skin:  Denies rash  Neurologic:  Denies headache, focal weakness or sensory changes   Endocrine:  Denies polyuria or polydypsia   Lymphatic:  Denies swollen glands     All other review of systems are negative  See HPI and nursing notes for additional information     PAST MEDICAL AND SURGICAL HISTORY    Past Medical History:   Diagnosis Date    Arthritis     CAD (coronary artery disease)     H/O cardiac catheterization 2013 or 2014    No stenting    History of blood clots 1995    History of echocardiogram 05/17/2019    EF 50-55%, Mild concentric left ventricular hypertrophy, sclerotic but non stenotic aortic valve, Mild thickening of anterior leaflet of mitral valve, Mild Pulm HTN    History of nuclear stress test 05/20/2019    Normal LVEF, normal tracer uptake in all myocardial segment during rest and stress, decreased uptake inferiorly due to diaphragmatic artifact    Hx of Arterial Doppler ultrasound 05/17/2019    Normal bilateral ABIs, Right distal SFA has 20-49% stenosis, diffuse plaquing noted throughout    Hx of blood clots     Hyperlipidemia     Hypertension     PAD (peripheral artery disease) ([address]     Venous malformation 11/2019     Past Surgical History:   Procedure Laterality Date    APPENDECTOMY      COLONOSCOPY N/A 10/18/2018    COLONOSCOPY WITH BIOPSY performed by Zonia Hayward MD at [address], COLON, DIAGNOSTIC      2 weeks ago    ENDOSCOPY, COLON, DIAGNOSTIC  11/01/2017    egd w/ biopsy, healing duodenal ulcers w/o recurrent bleeding possible barretts, small hiatal hernia    ENDOSCOPY, COLON, DIAGNOSTIC  12/04/2019    enteroscopy, bx, cauterization, avm stomach. hiatal hernia, short segment barretts    FEMORAL BYPASS Bilateral 1995    aortal-femoral bypass surgery     LAPAROSCOPIC APPENDECTOMY  10/26/2016    and primary closure umbilical hernia    UPPER GASTROINTESTINAL ENDOSCOPY N/A 12/4/2019    ENTEROSCOPY WITH INTERVENTION apc cauterization avm upper body of stomach, and biopsy performed by Zonia Hayward MD at [address]    Current Outpatient Rx   Medication Sig Dispense Refill    nitroGLYCERIN (NITROSTAT) 0.4 MG SL tablet Place 1 tablet under the tongue every 5 minutes as needed for Chest pain 25 tablet 3    pravastatin (PRAVACHOL) 40 MG tablet Take 40 mg by mouth daily      ferrous sulfate 325 (65 Fe) MG tablet Take 325 mg by mouth daily (with breakfast)      NONFORMULARY 1 Dose every 3 months Pt gets Cortisone joint injection shots every 3 months in bilateral shoulders and Left hip, in [place]. [facility].  Dr. Misha Bonds Cholecalciferol (VITAMIN D3) 2000 units TABS Take 1 tablet by mouth every morning       Magnesium 400 MG CAPS Take 1 capsule by mouth every morning       diclofenac sodium (VOLTAREN) 1 % GEL Apply 2 g topically nightly as needed       pantoprazole (PROTONIX) 40 MG tablet Take 1 tablet by mouth every morning (before breakfast) 30 tablet 3    warfarin (COUMADIN) 1 MG tablet Take 1 tablet by mouth every other day for 10 days (Patient taking differently: Take 4 mg by mouth every morning ) 30 tablet 0    Aspirin Buf,KfPeh-CpSon-QuEbj, (BUFFERED ASPIRIN) 325 MG TABS Take by mouth every morning       fenofibrate (TRICOR) 54 MG tablet Take 54 mg by mouth every morning [facility]: Please dispense generic fenofibrate unless prescriber denote       ascorbic acid (GNP VITAMIN C) 500 MG tablet Take 500 mg by mouth every morning       metoprolol succinate (TOPROL XL) 50 MG extended release tablet Take 50 mg by mouth 2 times daily      vitamin E 400 UNIT capsule Take 400 Units by mouth every morning       Multiple Vitamins-Minerals (CENTRUM SILVER PO) Take 1 tablet by mouth every morning          ALLERGIES    Allergies   Allergen Reactions    Azithromycin Diarrhea and Nausea And Vomiting     Diarrhea and coffee ground vomiting. SOCIAL AND FAMILY HISTORY    Social History     Socioeconomic History    Marital status:      Spouse name: None    Number of children: None    Years of education: None    Highest education level: None   Occupational History    None   Tobacco Use    Smoking status: Former Smoker     Types: Cigars, Pipe    Smokeless tobacco: Never Used    Tobacco comment: patient states he vapes    Vaping Use    Vaping Use: Every day    Substances: Never   Substance and Sexual Activity    Alcohol use: Yes     Comment: occasionally     Drug use: No    Sexual activity: None   Other Topics Concern    None   Social History Narrative    None     Social Determinants of Health     Financial Resource Strain:     Difficulty of Paying Living Expenses: Not on file   Food Insecurity:     Worried About Running Out of Food in the Last Year: Not on file    [place] of Food in the Last Year: Not on file   Transportation Needs:     Lack of Transportation (Medical):  Not on file    Lack of Transportation (Non-Medical): Not on file   Physical Activity:     Days of Exercise per Week: Not on file    Minutes of Exercise per Session: Not on file   Stress:     Feeling of Stress : Not on file   Social Connections:     Frequency of Communication with Friends and Family: Not on file    Frequency of Social Gatherings with Friends and Family: Not on file    Attends Voodoo Services: Not on file    Active Member of [address] [place] [address] or Organizations: Not on file    Attends Club or Organization Meetings: Not on file    Marital Status: Not on file   Intimate Partner Violence:     Fear of Current or Ex-Partner: Not on file    Emotionally Abused: Not on file    Physically Abused: Not on file    Sexually Abused: Not on file   Housing Stability:     Unable to Pay for Housing in the Last Year: Not on file    Number of [place] in the Last Year: Not on file    Unstable Housing in the Last Year: Not on file     Family History   Problem Relation Age of Onset    Cancer Mother     Heart Attack Father     Hypertension Father     Crohn's Disease Sister     Deep Vein Thrombosis Sister          PHYSICAL EXAM    VITAL SIGNS: BP (!) 218/88   Pulse 68   Temp 97.7 °F (36.5 °C) (Oral)   Resp 15   Ht 5' 10\" (1.778 m)   Wt 170 lb (77.1 kg)   SpO2 99%   BMI 24.39 kg/m²    Constitutional:  Well developed, Well nourished. No distress  HENT:  Normocephalic, Atraumatic, PERRL. EOMI. Sclera clear. Conjunctiva normal, No discharge. Neck/Lymphatics: supple, no JVD, no swollen nodes  Cardiovascular:   RRR,  no murmurs/rubs/gallops. No JVD  Respiratory:  Nonlabored breathing. Normal breath sounds, No wheezing  Abdomen: Bowel sounds normal, Soft, No tenderness, no masses. Musculoskeletal:    There is no edema, asymmetry, or calf / thigh tenderness bilaterally. No cyanosis. No cool or pale-appearing limb.   Distal cap refill and pulses intact bilaterally  Bilateral upper and lower extremity ROM intact without pain or obvious deficit  Integument:  Warm, Dry  Neurologic: Alert & oriented , No focal deficits noted. Cranial nerves II through XII grossly intact. Normal gross motor coordination & motor strength bilateral upper and lower extremities  Sensation intact. Psychiatric:  Affect normal, Mood normal.       Labs:          ED COURSE & MEDICAL DECISION MAKING       35-year-old male with history of hypertension presents to the emergency department for blood pressure. The patient has been tracking his blood pressure all week after having an elevated blood pressure at his physician's office last week. He is asymptomatic. Labs obtained and did not show an elevated WBC or anemia. Creatinine normal.    He started on hydrochlorothiazide 12.5 mg p. o. Blood pressure was improving and systolic was less than 044. He was instructed to follow-up with his primary care provider this week and to call Monday to schedule an appointment and return here with any headache, vision changes, chest pain, shortness of breath, or other complaints. Patient agrees to return emergency department if symptoms worsen or any new symptoms develop. Vital signs and nursing notes reviewed during ED course. Clinical  IMPRESSION    1. Primary hypertension              Comment: Please note this report has been produced using speech recognition software and may contain errors related to that system including errors in grammar, punctuation, and spelling, as well as words and phrases that may be inappropriate. If there are any questions or concerns please feel free to contact the dictating provider for clarification.       GERRI Mayers - RIMMA  03/19/22 [id number]

## 2022-10-13 ENCOUNTER — CLINICAL DOCUMENTATION (OUTPATIENT)
Dept: SPIRITUAL SERVICES | Age: 77
End: 2022-10-13

## 2022-10-13 ENCOUNTER — OFFICE VISIT (OUTPATIENT)
Dept: FAMILY MEDICINE CLINIC | Age: 77
End: 2022-10-13
Payer: MEDICARE

## 2022-10-13 VITALS
OXYGEN SATURATION: 95 % | HEART RATE: 61 BPM | SYSTOLIC BLOOD PRESSURE: 118 MMHG | HEIGHT: 68 IN | BODY MASS INDEX: 25.98 KG/M2 | WEIGHT: 171.4 LBS | DIASTOLIC BLOOD PRESSURE: 70 MMHG

## 2022-10-13 DIAGNOSIS — F17.200 SMOKING: ICD-10-CM

## 2022-10-13 DIAGNOSIS — Z79.01 WARFARIN ANTICOAGULATION: ICD-10-CM

## 2022-10-13 DIAGNOSIS — Z00.00 INITIAL MEDICARE ANNUAL WELLNESS VISIT: Primary | ICD-10-CM

## 2022-10-13 DIAGNOSIS — K21.9 GASTROESOPHAGEAL REFLUX DISEASE WITHOUT ESOPHAGITIS: ICD-10-CM

## 2022-10-13 DIAGNOSIS — Z11.59 NEED FOR HEPATITIS C SCREENING TEST: ICD-10-CM

## 2022-10-13 DIAGNOSIS — R53.83 FATIGUE, UNSPECIFIED TYPE: ICD-10-CM

## 2022-10-13 DIAGNOSIS — I73.9 PAD (PERIPHERAL ARTERY DISEASE) (HCC): ICD-10-CM

## 2022-10-13 DIAGNOSIS — Z86.718 HX OF FEMORAL ARTERY THROMBOSIS: ICD-10-CM

## 2022-10-13 DIAGNOSIS — I10 ESSENTIAL HYPERTENSION: ICD-10-CM

## 2022-10-13 DIAGNOSIS — E78.2 MIXED HYPERLIPIDEMIA: ICD-10-CM

## 2022-10-13 DIAGNOSIS — M15.9 PRIMARY OSTEOARTHRITIS INVOLVING MULTIPLE JOINTS: ICD-10-CM

## 2022-10-13 PROBLEM — R97.20 RAISED PROSTATE SPECIFIC ANTIGEN: Status: ACTIVE | Noted: 2022-10-13

## 2022-10-13 PROBLEM — M19.90 OSTEOARTHRITIS: Status: ACTIVE | Noted: 2021-05-13

## 2022-10-13 PROBLEM — I25.10 ARTERIOSCLEROSIS OF CORONARY ARTERY: Status: ACTIVE | Noted: 2022-10-13

## 2022-10-13 PROBLEM — I25.9 CHRONIC ISCHEMIC HEART DISEASE: Status: ACTIVE | Noted: 2022-10-13

## 2022-10-13 PROBLEM — G47.33 OBSTRUCTIVE SLEEP APNEA SYNDROME: Status: ACTIVE | Noted: 2022-10-13

## 2022-10-13 PROBLEM — N40.0 BENIGN PROSTATIC HYPERPLASIA: Status: ACTIVE | Noted: 2022-10-13

## 2022-10-13 LAB
INTERNATIONAL NORMALIZATION RATIO, POC: 7.9
PROTHROMBIN TIME, POC: 95.1

## 2022-10-13 PROCEDURE — G0438 PPPS, INITIAL VISIT: HCPCS | Performed by: FAMILY MEDICINE

## 2022-10-13 PROCEDURE — G8427 DOCREV CUR MEDS BY ELIG CLIN: HCPCS | Performed by: FAMILY MEDICINE

## 2022-10-13 PROCEDURE — 1123F ACP DISCUSS/DSCN MKR DOCD: CPT | Performed by: FAMILY MEDICINE

## 2022-10-13 PROCEDURE — G8417 CALC BMI ABV UP PARAM F/U: HCPCS | Performed by: FAMILY MEDICINE

## 2022-10-13 PROCEDURE — 36415 COLL VENOUS BLD VENIPUNCTURE: CPT | Performed by: FAMILY MEDICINE

## 2022-10-13 PROCEDURE — 1036F TOBACCO NON-USER: CPT | Performed by: FAMILY MEDICINE

## 2022-10-13 PROCEDURE — 85610 PROTHROMBIN TIME: CPT | Performed by: FAMILY MEDICINE

## 2022-10-13 PROCEDURE — 99204 OFFICE O/P NEW MOD 45 MIN: CPT | Performed by: FAMILY MEDICINE

## 2022-10-13 PROCEDURE — G8484 FLU IMMUNIZE NO ADMIN: HCPCS | Performed by: FAMILY MEDICINE

## 2022-10-13 RX ORDER — AMLODIPINE BESYLATE 5 MG/1
5 TABLET ORAL DAILY
Qty: 90 TABLET | Refills: 1 | Status: SHIPPED | OUTPATIENT
Start: 2022-10-13

## 2022-10-13 RX ORDER — METOPROLOL SUCCINATE 100 MG/1
100 TABLET, EXTENDED RELEASE ORAL DAILY
Qty: 90 TABLET | Refills: 1 | Status: SHIPPED | OUTPATIENT
Start: 2022-10-13

## 2022-10-13 RX ORDER — CELECOXIB 200 MG/1
200 CAPSULE ORAL DAILY
Qty: 30 CAPSULE | Refills: 3 | Status: SHIPPED | OUTPATIENT
Start: 2022-10-13

## 2022-10-13 RX ORDER — AMLODIPINE BESYLATE 5 MG/1
TABLET ORAL
COMMUNITY
Start: 2022-09-10 | End: 2022-10-13 | Stop reason: SDUPTHER

## 2022-10-13 RX ORDER — WARFARIN SODIUM 1 MG/1
4 TABLET ORAL EVERY MORNING
Qty: 120 TABLET | Refills: 0 | Status: SHIPPED | OUTPATIENT
Start: 2022-10-13

## 2022-10-13 RX ORDER — PRAVASTATIN SODIUM 40 MG
40 TABLET ORAL DAILY
Qty: 90 TABLET | Refills: 1 | Status: SHIPPED | OUTPATIENT
Start: 2022-10-13

## 2022-10-13 RX ORDER — HYDROCHLOROTHIAZIDE 25 MG/1
25 TABLET ORAL EVERY MORNING
Qty: 90 TABLET | Refills: 1 | Status: SHIPPED | OUTPATIENT
Start: 2022-10-13

## 2022-10-13 RX ORDER — INDOMETHACIN 25 MG/1
25 CAPSULE ORAL PRN
COMMUNITY

## 2022-10-13 RX ORDER — TAMSULOSIN HYDROCHLORIDE 0.4 MG/1
0.4 CAPSULE ORAL PRN
COMMUNITY

## 2022-10-13 SDOH — ECONOMIC STABILITY: HOUSING INSECURITY
IN THE LAST 12 MONTHS, WAS THERE A TIME WHEN YOU DID NOT HAVE A STEADY PLACE TO SLEEP OR SLEPT IN A SHELTER (INCLUDING NOW)?: NO

## 2022-10-13 SDOH — HEALTH STABILITY: PHYSICAL HEALTH: ON AVERAGE, HOW MANY MINUTES DO YOU ENGAGE IN EXERCISE AT THIS LEVEL?: 20 MIN

## 2022-10-13 SDOH — ECONOMIC STABILITY: TRANSPORTATION INSECURITY
IN THE PAST 12 MONTHS, HAS LACK OF TRANSPORTATION KEPT YOU FROM MEETINGS, WORK, OR FROM GETTING THINGS NEEDED FOR DAILY LIVING?: NO

## 2022-10-13 SDOH — ECONOMIC STABILITY: TRANSPORTATION INSECURITY
IN THE PAST 12 MONTHS, HAS THE LACK OF TRANSPORTATION KEPT YOU FROM MEDICAL APPOINTMENTS OR FROM GETTING MEDICATIONS?: NO

## 2022-10-13 SDOH — ECONOMIC STABILITY: INCOME INSECURITY: IN THE LAST 12 MONTHS, WAS THERE A TIME WHEN YOU WERE NOT ABLE TO PAY THE MORTGAGE OR RENT ON TIME?: NO

## 2022-10-13 SDOH — HEALTH STABILITY: PHYSICAL HEALTH: ON AVERAGE, HOW MANY DAYS PER WEEK DO YOU ENGAGE IN MODERATE TO STRENUOUS EXERCISE (LIKE A BRISK WALK)?: 1 DAY

## 2022-10-13 SDOH — ECONOMIC STABILITY: FOOD INSECURITY: WITHIN THE PAST 12 MONTHS, THE FOOD YOU BOUGHT JUST DIDN'T LAST AND YOU DIDN'T HAVE MONEY TO GET MORE.: NEVER TRUE

## 2022-10-13 SDOH — ECONOMIC STABILITY: FOOD INSECURITY: WITHIN THE PAST 12 MONTHS, YOU WORRIED THAT YOUR FOOD WOULD RUN OUT BEFORE YOU GOT MONEY TO BUY MORE.: NEVER TRUE

## 2022-10-13 ASSESSMENT — LIFESTYLE VARIABLES
HOW OFTEN DO YOU HAVE A DRINK CONTAINING ALCOHOL: 2-4 TIMES A MONTH
HOW MANY STANDARD DRINKS CONTAINING ALCOHOL DO YOU HAVE ON A TYPICAL DAY: 1 OR 2

## 2022-10-13 ASSESSMENT — ANXIETY QUESTIONNAIRES
6. BECOMING EASILY ANNOYED OR IRRITABLE: 0
IF YOU CHECKED OFF ANY PROBLEMS ON THIS QUESTIONNAIRE, HOW DIFFICULT HAVE THESE PROBLEMS MADE IT FOR YOU TO DO YOUR WORK, TAKE CARE OF THINGS AT HOME, OR GET ALONG WITH OTHER PEOPLE: NOT DIFFICULT AT ALL
1. FEELING NERVOUS, ANXIOUS, OR ON EDGE: 0
7. FEELING AFRAID AS IF SOMETHING AWFUL MIGHT HAPPEN: 0
GAD7 TOTAL SCORE: 0
2. NOT BEING ABLE TO STOP OR CONTROL WORRYING: 0
4. TROUBLE RELAXING: 0
3. WORRYING TOO MUCH ABOUT DIFFERENT THINGS: 0
5. BEING SO RESTLESS THAT IT IS HARD TO SIT STILL: 0

## 2022-10-13 ASSESSMENT — SOCIAL DETERMINANTS OF HEALTH (SDOH)
HOW OFTEN DO YOU ATTENT MEETINGS OF THE CLUB OR ORGANIZATION YOU BELONG TO?: MORE THAN 4 TIMES PER YEAR
IN A TYPICAL WEEK, HOW MANY TIMES DO YOU TALK ON THE PHONE WITH FAMILY, FRIENDS, OR NEIGHBORS?: TWICE A WEEK
WITHIN THE LAST YEAR, HAVE YOU BEEN HUMILIATED OR EMOTIONALLY ABUSED IN OTHER WAYS BY YOUR PARTNER OR EX-PARTNER?: NO
WITHIN THE LAST YEAR, HAVE YOU BEEN AFRAID OF YOUR PARTNER OR EX-PARTNER?: NO
HOW OFTEN DO YOU ATTEND CHURCH OR RELIGIOUS SERVICES?: NEVER
DO YOU BELONG TO ANY CLUBS OR ORGANIZATIONS SUCH AS CHURCH GROUPS UNIONS, FRATERNAL OR ATHLETIC GROUPS, OR SCHOOL GROUPS?: YES
HOW HARD IS IT FOR YOU TO PAY FOR THE VERY BASICS LIKE FOOD, HOUSING, MEDICAL CARE, AND HEATING?: NOT HARD AT ALL
WITHIN THE LAST YEAR, HAVE TO BEEN RAPED OR FORCED TO HAVE ANY KIND OF SEXUAL ACTIVITY BY YOUR PARTNER OR EX-PARTNER?: NO
WITHIN THE LAST YEAR, HAVE YOU BEEN KICKED, HIT, SLAPPED, OR OTHERWISE PHYSICALLY HURT BY YOUR PARTNER OR EX-PARTNER?: NO
HOW OFTEN DO YOU GET TOGETHER WITH FRIENDS OR RELATIVES?: ONCE A WEEK

## 2022-10-13 ASSESSMENT — ENCOUNTER SYMPTOMS
COUGH: 0
ABDOMINAL DISTENTION: 0
ANAL BLEEDING: 0
ABDOMINAL PAIN: 0
BLOOD IN STOOL: 0
CHOKING: 0
NAUSEA: 0
VOMITING: 0

## 2022-10-13 ASSESSMENT — PATIENT HEALTH QUESTIONNAIRE - PHQ9
SUM OF ALL RESPONSES TO PHQ QUESTIONS 1-9: 0
SUM OF ALL RESPONSES TO PHQ9 QUESTIONS 1 & 2: 0
1. LITTLE INTEREST OR PLEASURE IN DOING THINGS: NOT AT ALL
SUM OF ALL RESPONSES TO PHQ QUESTIONS 1-9: 0
2. FEELING DOWN, DEPRESSED OR HOPELESS: NOT AT ALL
SUM OF ALL RESPONSES TO PHQ QUESTIONS 1-9: 0
SUM OF ALL RESPONSES TO PHQ QUESTIONS 1-9: 0
2. FEELING DOWN, DEPRESSED OR HOPELESS: 0
DEPRESSION UNABLE TO ASSESS: YES
SUM OF ALL RESPONSES TO PHQ9 QUESTIONS 1 & 2: 0
SUM OF ALL RESPONSES TO PHQ QUESTIONS 1-9: 0
SUM OF ALL RESPONSES TO PHQ9 QUESTIONS 1 & 2: 0
SUM OF ALL RESPONSES TO PHQ9 QUESTIONS 1 & 2: 0
2. FEELING DOWN, DEPRESSED OR HOPELESS: NOT AT ALL
1. LITTLE INTEREST OR PLEASURE IN DOING THINGS: 0
1. LITTLE INTEREST OR PLEASURE IN DOING THINGS: 0
SUM OF ALL RESPONSES TO PHQ QUESTIONS 1-9: 0
1. LITTLE INTEREST OR PLEASURE IN DOING THINGS: NOT AT ALL
2. FEELING DOWN, DEPRESSED OR HOPELESS: 0

## 2022-10-13 NOTE — ASSESSMENT & PLAN NOTE
Continue warfarin. He does not believe the fingerstick pro time results with an INR of 7.9. He states that his INR has been very stable for many years. He will go to the lab and get a venous sample pro time INR done and lab orders were given.

## 2022-10-13 NOTE — ACP (ADVANCE CARE PLANNING)
Advance Care Planning   Ambulatory ACP Specialist Patient Outreach    Date:  10/13/2022  ACP Specialist:  DEVIKA Estes    Outreach call to patient in follow-up to ACP Specialist referral from: Virginia Angelo DO    [x] PCP  [] Provider   [] Ambulatory Care Management [] Other for Reason:    [x] Advance Directive Assistance  [] Code Status Discussion  [] Complete Portable DNR Order  [] Discuss Goals of Care  [] Complete POST/MOST  [] Early ACP Decision-Making  [] Other    Date Referral Received:10/13/2022    Today's Outreach:  [x] First   [] Second  [] Third                               Third outreach made by []  phone  [] email []   Astley Clarket     Intervention:  [x] Spoke with Patient  [x] Left VM requesting return call      Outcome: AMB ACP Specialist Referral received. Placed call to pt and left general VM. Will make next attempt as per process. Next Step:   [] ACP scheduled conversation  [x] Outreach again in one week               [] Email / Mail ACP Info Sheets  [] Email / Mail Advance Directive            [x] Close Referral. Routing closure to referring provider/staff and to ACP Specialist . [] Closure Letter mailed to Patient with Invitation to Contact ACP Specialist if/when ready. Thank you for this referral.    10/13/2022 ADDENDUM:  Pt returned my call moments after leaving VM for pt and left VM for this SW. Apologized for missing pt's call. Explained ACP Specialist referral. Pt shared he has a living will and hcpoa. Pt does not wish to place copy in medical record at this time. Offered to assist with this as Pt has [employer]; explained pt can upload copy at later date if he chooses. Pt confirmed spouse would be primary HCDM. Pt did not have any other questions and did not wish to proceed with any other ACP at this time. Thanked pt for his time today and he has this SW contact should he have any questions. This referral can be closed.

## 2022-10-13 NOTE — PATIENT INSTRUCTIONS
Advance Directives: Care Instructions  Overview  An advance directive is a legal way to state your wishes at the end of your life. It tells your family and your doctor what to do if you can't say what you want. There are two main types of advance directives. You can change them any time your wishes change. Living will. This form tells your family and your doctor your wishes about life support and other treatment. The form is also called a declaration. Medical power of . This form lets you name a person to make treatment decisions for you when you can't speak for yourself. This person is called a health care agent (health care proxy, health care surrogate). The form is also called a durable power of  for health care. If you do not have an advance directive, decisions about your medical care may be made by a family member, or by a doctor or a  who doesn't know you. It may help to think of an advance directive as a gift to the people who care for you. If you have one, they won't have to make tough decisions by themselves. Follow-up care is a key part of your treatment and safety. Be sure to make and go to all appointments, and call your doctor if you are having problems. It's also a good idea to know your test results and keep a list of the medicines you take. What should you include in an advance directive? Many states have a unique advance directive form. (It may ask you to address specific issues.) Or you might use a universal form that's approved by many states. If your form doesn't tell you what to address, it may be hard to know what to include in your advance directive. Use the questions below to help you get started. Who do you want to make decisions about your medical care if you are not able to? What life-support measures do you want if you have a serious illness that gets worse over time or can't be cured? What are you most afraid of that might happen?  (Maybe you're afraid of having pain, losing your independence, or being kept alive by machines.)  Where would you prefer to die? (Your home? A hospital? A nursing home?)  Do you want to donate your organs when you die? Do you want certain Gnosticist practices performed before you die? When should you call for help? Be sure to contact your doctor if you have any questions. Where can you learn more? Go to [email or web address] and sign in to your [employer] account. Enter R264 in the [facility] box to learn more about \"Advance Directives: Care Instructions. \"     If you do not have an account, please click on the \"Sign Up Now\" link. Current as of: June 16, 2022               Content Version: 13.4  © 9706-5407 Healthwise, [employer]. Care instructions adapted under license by [facility] ([facility]). If you have questions about a medical condition or this instruction, always ask your healthcare professional. [address] any warranty or liability for your use of this information. Learning About Benefits From Quitting Smoking  Why is it important to quit smoking? If you're thinking about quitting smoking, you may have a few reasons to be smoke-free. Your health may be one of them. When you quit smoking, you lower your risk for many serious health problems, such as cancer, lung disease, heart attack, stroke, blood vessel disease, and blindness from macular degeneration. When you're smoke-free, you get sick less often, and you heal faster. You are less likely to get colds, flu, bronchitis, and pneumonia. As a nonsmoker, you may find that your mood is better and you are less stressed. When and how will you feel healthier? Quitting has real health benefits that start from day 1 of being smoke-free. And the longer you stay smoke-free, the healthier you get and the better you feel. The first hours  After just 20 minutes, your blood pressure and heart rate go down.  That warranty or liability for your use of this information. Personalized Preventive Plan for [place] - 10/13/2022  Medicare offers a range of preventive health benefits. Some of the tests and screenings are paid in full while other may be subject to a deductible, co-insurance, and/or copay. Some of these benefits include a comprehensive review of your medical history including lifestyle, illnesses that may run in your family, and various assessments and screenings as appropriate. After reviewing your medical record and screening and assessments performed today your provider may have ordered immunizations, labs, imaging, and/or referrals for you. A list of these orders (if applicable) as well as your Preventive Care list are included within your After Visit Summary for your review. Other Preventive Recommendations:    A preventive eye exam performed by an eye specialist is recommended every 1-2 years to screen for glaucoma; cataracts, macular degeneration, and other eye disorders. A preventive dental visit is recommended every 6 months. Try to get at least 150 minutes of exercise per week or 10,000 steps per day on a pedometer . Order or download the FREE \"Exercise & Physical Activity: Your Everyday Guide\" from [employer]. Call 0-203.416.3300 or search [employer] online. You need 2339-1072 mg of calcium and 8511-3806 IU of vitamin D per day. It is possible to meet your calcium requirement with diet alone, but a vitamin D supplement is usually necessary to meet this goal.  When exposed to the sun, use a sunscreen that protects against both UVA and UVB radiation with an SPF of 30 or greater. Reapply every 2 to 3 hours or after sweating, drying off with a towel, or swimming. Always wear a seat belt when traveling in a car. Always wear a helmet when riding a bicycle or motorcycle.

## 2022-10-13 NOTE — ASSESSMENT & PLAN NOTE
He has been on pantoprazole due to a history of an AV malformation and duodenal ulcer. Both of those problems have been taking care of so he does not think he needs to keep taking pantoprazole. He has no reported history of Zimmer's esophagitis. I will discontinue pantoprazole.

## 2022-10-13 NOTE — ASSESSMENT & PLAN NOTE
We will do a trial of celecoxib 200 mg daily for arthritis pain. Note that the arthritis makes it so that he cannot exercise as his ideal with a person who has peripheral artery disease.

## 2022-10-13 NOTE — PROGRESS NOTES
10/13/22    Bakari Sharma  1945    SUBJECTIVE    HPI - Buzz Manriquez is a 68 y.o. male who presents today for evaluation of:  Chief Complaint   Patient presents with    New Patient     Pt./ here today for new patient appt. HTN : well controlled with amlodipine, hctz and metoprolol. He has been taking hydrochlorothiazide 12.5 mg twice a day and taking metoprolol succinate extended release 50 mg twice a day. He does not like taking them twice a day and often forgets the second dose. CAD : about twice a yr gets chest pain. Carries ntg just in case. HLD : hx of tags > 500. PAD : weak pulses. Aortic thrombus: age 48 had severe blood clots and takes warfarin. In 27 yrs INR has been stable and he has taken 4mg x 5 yr. He does not believe todays reading. No recent bloody nose or bruising. He reports blood clots getting caught at the bifurcation of the aorta to the iliac arteries and that his aorta and iliac arteries had to be rebuilt by a surgeon. GI: hx of AV malformation and also hx of duodenal ulcer and does not think he needs pantoprazole. No heartburn. OA : arthritis in multiple joints. Pain prevents exercise. He gave up on Voltaren gel. He has chronic fatigue and thinks it is likely due to some of his medications. Review of Systems   Constitutional:  Positive for fatigue. Respiratory:  Negative for cough and choking. Cardiovascular:  Positive for chest pain (rare). Negative for palpitations and leg swelling. Gastrointestinal:  Negative for abdominal distention, abdominal pain, anal bleeding, blood in stool, nausea and vomiting. Psychiatric/Behavioral:  Negative for dysphoric mood. The patient is not nervous/anxious. Allergies   Allergen Reactions    Azithromycin Diarrhea and Nausea And Vomiting     Diarrhea and coffee ground vomiting.          OBJECTIVE    /70   Pulse 61   Ht 5' 8\" (1.727 m)   Wt 171 lb 6.4 oz (77.7 kg)   SpO2 95%   BMI 26.06 kg/m²     Physical Exam   Constitutional:       General: Not in acute distress. Appearance: Normal appearance. Not ill-appearing. Eyes:      General: No scleral icterus. Cardiovascular:      Rate and Rhythm: Normal rate and regular rhythm. Heart sounds: No murmur heard. No friction rub. No gallop. Pulmonary:      Effort: Pulmonary effort is normal. No respiratory distress. Breath sounds: No wheezing, rhonchi or rales. Abdominal:      Palpations: Abdomen is soft. There is no mass. Tenderness: There is no abdominal tenderness. +ventral hernia  Musculoskeletal:     Moves all extremities normally. Skin:     General: Skin is warm. Coloration: Skin is not jaundiced. Neurological:      Mental Status: Patient is alert. Psychiatric:         Behavior: Behavior normal.         Thought Content: Thought content normal.         Judgment: Judgment normal.        ASSESSMENT/PLAN:    1. Initial Medicare annual wellness visit  -     Ambulatory Referral to [facility] Clinical Specialist  2. HLD, Mixed hyperlipidemia  -     Lipid Panel  -     pravastatin (PRAVACHOL) 40 MG tablet; Take 1 tablet by mouth daily, Disp-90 tablet, R-1Normal  3. PAD (peripheral artery disease) (HCC)  Assessment & Plan:   Exercise limited by arthritis. Encouraged smoking cessation. 4. Need for hepatitis C screening test  -     Hepatitis C Antibody  5. Primary osteoarthritis involving multiple joints  Assessment & Plan: We will do a trial of celecoxib 200 mg daily for arthritis pain. Note that the arthritis makes it so that he cannot exercise as his ideal with a person who has peripheral artery disease. Orders:  -     celecoxib (CELEBREX) 200 MG capsule; Take 1 capsule by mouth daily, Disp-30 capsule, R-3Normal  6. Hx of femoral artery thrombosis  Assessment & Plan:   Continue warfarin. He does not believe the fingerstick pro time results with an INR of 7.9. He states that his INR has been very stable for many years.   He will go to the lab and get a venous sample pro time INR done and lab orders were given. Orders:  -     Protime-INR  -     warfarin (COUMADIN) 1 MG tablet; Take 4 tablets by mouth every morning, Disp-120 tablet, R-0Normal  7. Warfarin anticoagulation  -     POCT INR  -     warfarin (COUMADIN) 1 MG tablet; Take 4 tablets by mouth every morning, Disp-120 tablet, R-0Normal  8. Fatigue, unspecified type  Assessment & Plan:   I will do a TSH and CBC to look for possible causes of fatigue. Orders:  -     CBC with Auto Differential  -     TSH with Reflex to FT4  9. Gastroesophageal reflux disease without esophagitis  Assessment & Plan:   He has been on pantoprazole due to a history of an AV malformation and duodenal ulcer. Both of those problems have been taking care of so he does not think he needs to keep taking pantoprazole. He has no reported history of Zimmer's esophagitis. I will discontinue pantoprazole. 10. HTN, Essential hypertension  Assessment & Plan:   I will consolidate metoprolol and hydrochlorothiazide into 1 morning pill at doses of hydrochlorothiazide 25 mg and Toprol- mg. Continue amlodipine. Orders:  -     hydroCHLOROthiazide (HYDRODIURIL) 25 MG tablet; Take 1 tablet by mouth every morning, Disp-90 tablet, R-1Normal  -     metoprolol succinate (TOPROL XL) 100 MG extended release tablet; Take 1 tablet by mouth daily, Disp-90 tablet, R-1Normal  -     amLODIPine (NORVASC) 5 MG tablet; Take 1 tablet by mouth daily, Disp-90 tablet, R-1Normal  -     Comprehensive Metabolic Panel  11. Smoking  Assessment & Plan:   He is getting lung cancer screening done at the [facility].    It is safe to take acetaminophen up to a total dose of 3000 mg spread through the day. Be sure to include acetaminophen in all products since acetaminophen can be in cold preparations and in some opioid pain medications. Counseling provided for:  Healthy eating - Avoid sugar and other refined carbohydrates.   and Eat more foods with fiber like vegetables and whole grain products.   >> Exercise - 1> try to do 150 minutes a week of exercise that is as hard as walking briskly (30 minutes 5 days a week or 22 minutes every day); and 2> do some strength training 2 or 3 times a week. Return in 5 months (on 3/13/2023) for OA. Aminata Mitchell MD     Medicare Annual Wellness Visit    [facility] is here for New Patient (Pt./ here today for new patient appt. )    Assessment & Plan   Initial Medicare annual wellness visit  -     Ambulatory Referral to [facility] Clinical Specialist  HLD, Mixed hyperlipidemia  -     Lipid Panel  -     pravastatin (PRAVACHOL) 40 MG tablet; Take 1 tablet by mouth daily, Disp-90 tablet, R-1Normal  PAD (peripheral artery disease) (HCC)  Assessment & Plan:   Exercise limited by arthritis. Encouraged smoking cessation. Need for hepatitis C screening test  -     Hepatitis C Antibody  Primary osteoarthritis involving multiple joints  Assessment & Plan: We will do a trial of celecoxib 200 mg daily for arthritis pain. Note that the arthritis makes it so that he cannot exercise as his ideal with a person who has peripheral artery disease. Orders:  -     celecoxib (CELEBREX) 200 MG capsule; Take 1 capsule by mouth daily, Disp-30 capsule, R-3Normal  Hx of femoral artery thrombosis  Assessment & Plan:   Continue warfarin. He does not believe the fingerstick pro time results with an INR of 7.9. He states that his INR has been very stable for many years. He will go to the lab and get a venous sample pro time INR done and lab orders were given. Orders:  -     Protime-INR  -     warfarin (COUMADIN) 1 MG tablet; Take 4 tablets by mouth every morning, Disp-120 tablet, R-0Normal  Warfarin anticoagulation  -     POCT INR  -     warfarin (COUMADIN) 1 MG tablet;  Take 4 tablets by mouth every morning, Disp-120 tablet, R-0Normal  Fatigue, unspecified type  Assessment & Plan:   I will do a TSH and CBC to look for possible causes of fatigue. Orders:  -     CBC with Auto Differential  -     TSH with Reflex to FT4  Gastroesophageal reflux disease without esophagitis  Assessment & Plan:   He has been on pantoprazole due to a history of an AV malformation and duodenal ulcer. Both of those problems have been taking care of so he does not think he needs to keep taking pantoprazole. He has no reported history of Zimmer's esophagitis. I will discontinue pantoprazole. HTN, Essential hypertension  Assessment & Plan:   I will consolidate metoprolol and hydrochlorothiazide into 1 morning pill at doses of hydrochlorothiazide 25 mg and Toprol- mg. Continue amlodipine. Orders:  -     hydroCHLOROthiazide (HYDRODIURIL) 25 MG tablet; Take 1 tablet by mouth every morning, Disp-90 tablet, R-1Normal  -     metoprolol succinate (TOPROL XL) 100 MG extended release tablet; Take 1 tablet by mouth daily, Disp-90 tablet, R-1Normal  -     amLODIPine (NORVASC) 5 MG tablet; Take 1 tablet by mouth daily, Disp-90 tablet, R-1Normal  -     Comprehensive Metabolic Panel  Smoking  Assessment & Plan:   He is getting lung cancer screening done at the [facility].    Recommendations for Preventive Services Due: see orders and patient instructions/AVS.  Recommended screening schedule for the next 5-10 years is provided to the patient in written form: see Patient Instructions/AVS.     Return in 5 months (on 3/13/2023) for OA. Subjective   The following acute and/or chronic problems were also addressed today:  See above note. He does nto take opioids. Patient's complete Health Risk Assessment and screening values have been reviewed and are found in Flowsheets. The following problems were reviewed today and where indicated follow up appointments were made and/or referrals ordered.     Positive Risk Factor Screenings with Interventions:       Tobacco Use:  Tobacco Use: Medium Risk    Smoking Tobacco Use: Former    Smokeless Tobacco Use: Never     E-cigarette/Vaping Questions Responses    E-cigarette/Vaping Use Current Every Day User    Start Date     Passive Exposure     Quit Date     Counseling Given     Comments Mods or Advanced Personal Vaporizor ()          Substance Use - Tobacco Interventions:  Encouraged cessation. General Health and ACP:  General  In general, how would you say your health is?: Good  In the past 7 days, have you experienced any of the following: New or Increased Pain, New or Increased Fatigue, Loneliness, Social Isolation, Stress or Anger?: No  Do you get the social and emotional support that you need?: Yes  Do you have a Living Will?: Yes    Advance Directives       Power of  Living Will ACP-Advance Directive ACP-Power of     Not on File Not on File Not on File Not on File          General Health Risk Interventions:  Pain issues: will try celecoxib for OA pain. Safety:  Do you have working smoke detectors?: Yes  Do you have any tripping hazards - loose or unsecured carpets or rugs?: No  Do you have any tripping hazards - clutter in doorways, halls, or stairs?: No  Do you have either shower bars, grab bars, non-slip mats or non-slip surfaces in your shower or bathtub?: (!) No  Do all of your stairways have a railing or banister?: Not Applicable  Do you always fasten your seatbelt when you are in a car?: (!) No  Safety Interventions:  Home safety tips provided  Encouraged seat belt use and non-sloip shower mat. Objective   Vitals:    10/13/22 1036   BP: 118/70   Pulse: 61   SpO2: 95%   Weight: 171 lb 6.4 oz (77.7 kg)   Height: 5' 8\" (1.727 m)      Body mass index is 26.06 kg/m². Allergies   Allergen Reactions    Azithromycin Diarrhea and Nausea And Vomiting     Diarrhea and coffee ground vomiting. Prior to Visit Medications    Medication Sig Taking?  Authorizing Provider   tamsulosin (FLOMAX) 0.4 MG capsule Take 0.4 mg by mouth as needed Yes Historical Provider, MD   indomethacin (INDOCIN) 25 MG capsule Take 25 mg by mouth as needed for Pain Yes Historical Provider, MD   hydroCHLOROthiazide (HYDRODIURIL) 25 MG tablet Take 1 tablet by mouth every morning Yes Juliet Baugh MD   metoprolol succinate (TOPROL XL) 100 MG extended release tablet Take 1 tablet by mouth daily Yes Juliet Baugh MD   amLODIPine (NORVASC) 5 MG tablet Take 1 tablet by mouth daily Yes Juliet Baugh MD   pravastatin (PRAVACHOL) 40 MG tablet Take 1 tablet by mouth daily Yes Juliet Baugh MD   celecoxib (CELEBREX) 200 MG capsule Take 1 capsule by mouth daily Yes Juliet Baugh MD   warfarin (COUMADIN) 1 MG tablet Take 4 tablets by mouth every morning Yes Juliet Baugh MD   nitroGLYCERIN (NITROSTAT) 0.4 MG SL tablet Place 1 tablet under the tongue every 5 minutes as needed for Chest pain Yes Tonio Ram, APRN - CNP   NONFORMULARY 1 Dose every 3 months Pt gets Cortisone joint injection shots every 3 months in bilateral shoulders and Left hip, in [place]. Sports Medicine.  Dr. Laird Part Yes Historical Provider, MD   Cholecalciferol (VITAMIN D3) 2000 units TABS Take 1 tablet by mouth every morning  Yes Historical Provider, MD   Magnesium 400 MG CAPS Take 1 capsule by mouth every morning  Yes Historical Provider, MD   Aspirin Buf,WzOix-AbQoz-CiYup, (BUFFERED ASPIRIN) 325 MG TABS Take by mouth every morning  Yes Historical Provider, MD   fenofibrate (TRICOR) 54 MG tablet Take 54 mg by mouth every morning s [facility] pharmacy: Please dispense generic fenofibrate unless prescriber denote  Yes Historical Provider, MD   ascorbic acid (VITAMIN C) 500 MG tablet Take 500 mg by mouth every morning Yes Historical Provider, MD   vitamin E 400 UNIT capsule Take 400 Units by mouth every morning  Yes Historical Provider, MD   Multiple Vitamins-Minerals (CENTRUM SILVER PO) Take 1 tablet by mouth every morning  Yes Historical Provider, MD   diclofenac sodium (VOLTAREN) 1 % GEL Apply 2 g topically nightly as needed Patient not taking: Reported on 10/13/2022  Historical Provider, MD Estrada (Including outside providers/suppliers regularly involved in providing care):   Patient Care Team:  Virginia Angelo DO as PCP - General (Family Medicine)     Reviewed and updated this visit:  Tobacco  Allergies  Meds  Med Hx  Surg Hx  Soc Hx  Fam Hx

## 2022-10-13 NOTE — ASSESSMENT & PLAN NOTE
I will consolidate metoprolol and hydrochlorothiazide into 1 morning pill at doses of hydrochlorothiazide 25 mg and Toprol- mg. Continue amlodipine.

## 2022-10-14 ENCOUNTER — TELEPHONE (OUTPATIENT)
Dept: FAMILY MEDICINE CLINIC | Age: 77
End: 2022-10-14
Payer: MEDICARE

## 2022-10-14 ENCOUNTER — HOSPITAL ENCOUNTER (OUTPATIENT)
Age: 77
Discharge: HOME | End: 2022-10-14
Payer: MEDICARE

## 2022-10-14 DIAGNOSIS — Z86.718 HX OF FEMORAL ARTERY THROMBOSIS: Primary | ICD-10-CM

## 2022-10-14 LAB
ALBUMIN SERPL-MCNC: 4.2 GM/DL (ref 3.4–5)
ALP BLD-CCNC: 57 IU/L (ref 40–128)
ALT SERPL-CCNC: 14 U/L (ref 10–40)
ANION GAP SERPL CALCULATED.3IONS-SCNC: 11 MMOL/L (ref 4–16)
AST SERPL-CCNC: 24 IU/L (ref 15–37)
BASOPHILS ABSOLUTE: 0.1 K/CU MM
BASOPHILS RELATIVE PERCENT: 0.7 % (ref 0–1)
BILIRUB SERPL-MCNC: 0.3 MG/DL (ref 0–1)
BUN BLDV-MCNC: 15 MG/DL (ref 6–23)
CALCIUM SERPL-MCNC: 9.5 MG/DL (ref 8.3–10.6)
CHLORIDE BLD-SCNC: 103 MMOL/L (ref 99–110)
CHOLESTEROL: 187 MG/DL
CO2: 26 MMOL/L (ref 21–32)
CREAT SERPL-MCNC: 0.9 MG/DL (ref 0.9–1.3)
DIFFERENTIAL TYPE: ABNORMAL
EOSINOPHILS ABSOLUTE: 0.4 K/CU MM
EOSINOPHILS RELATIVE PERCENT: 5.5 % (ref 0–3)
GFR AFRICAN AMERICAN: >60 ML/MIN/1.73M2
GFR NON-AFRICAN AMERICAN: >60 ML/MIN/1.73M2
GLUCOSE BLD-MCNC: 112 MG/DL (ref 70–99)
HCT VFR BLD CALC: 44.4 % (ref 42–52)
HDLC SERPL-MCNC: 56 MG/DL
HEMOGLOBIN: 14 GM/DL (ref 13.5–18)
HEPATITIS C ANTIBODY: NON REACTIVE
IMMATURE NEUTROPHIL %: 0.4 % (ref 0–0.43)
INR BLD: 6.06 INDEX
LDL CHOLESTEROL CALCULATED: 99 MG/DL
LYMPHOCYTES ABSOLUTE: 1.3 K/CU MM
LYMPHOCYTES RELATIVE PERCENT: 19.1 % (ref 24–44)
MCH RBC QN AUTO: 31.5 PG (ref 27–31)
MCHC RBC AUTO-ENTMCNC: 31.5 % (ref 32–36)
MCV RBC AUTO: 99.8 FL (ref 78–100)
MONOCYTES ABSOLUTE: 0.7 K/CU MM
MONOCYTES RELATIVE PERCENT: 9.2 % (ref 0–4)
NUCLEATED RBC %: 0 %
PDW BLD-RTO: 12.8 % (ref 11.7–14.9)
PLATELET # BLD: 398 K/CU MM (ref 140–440)
PMV BLD AUTO: 9.3 FL (ref 7.5–11.1)
POTASSIUM SERPL-SCNC: 4.5 MMOL/L (ref 3.5–5.1)
PROTHROMBIN TIME: 79.6 SECONDS (ref 11.7–14.5)
RBC # BLD: 4.45 M/CU MM (ref 4.6–6.2)
SEGMENTED NEUTROPHILS ABSOLUTE COUNT: 4.6 K/CU MM
SEGMENTED NEUTROPHILS RELATIVE PERCENT: 65.1 % (ref 36–66)
SODIUM BLD-SCNC: 140 MMOL/L (ref 135–145)
T4 FREE: 1.14 NG/DL (ref 0.9–1.8)
TOTAL IMMATURE NEUTOROPHIL: 0.03 K/CU MM
TOTAL NUCLEATED RBC: 0 K/CU MM
TOTAL PROTEIN: 6.8 GM/DL (ref 6.4–8.2)
TRIGL SERPL-MCNC: 158 MG/DL
TSH HIGH SENSITIVITY: 1.33 UIU/ML (ref 0.27–4.2)
WBC # BLD: 7 K/CU MM (ref 4–10.5)

## 2022-10-14 PROCEDURE — 80053 COMPREHEN METABOLIC PANEL: CPT

## 2022-10-14 PROCEDURE — 80061 LIPID PANEL: CPT

## 2022-10-14 PROCEDURE — 36415 COLL VENOUS BLD VENIPUNCTURE: CPT

## 2022-10-14 PROCEDURE — 85610 PROTHROMBIN TIME: CPT | Performed by: FAMILY MEDICINE

## 2022-10-14 PROCEDURE — 86803 HEPATITIS C AB TEST: CPT

## 2022-10-14 PROCEDURE — 85025 COMPLETE CBC W/AUTO DIFF WBC: CPT

## 2022-10-14 PROCEDURE — 84439 ASSAY OF FREE THYROXINE: CPT

## 2022-10-14 PROCEDURE — 85610 PROTHROMBIN TIME: CPT

## 2022-10-14 PROCEDURE — 84443 ASSAY THYROID STIM HORMONE: CPT

## 2022-10-14 NOTE — TELEPHONE ENCOUNTER
I gave staff instructions that he should hold the warfarin tomorrow and Sunday (he already took it today). He should come to the office Monday morning to get an in office INR.

## 2022-10-17 ENCOUNTER — NURSE ONLY (OUTPATIENT)
Dept: FAMILY MEDICINE CLINIC | Age: 77
End: 2022-10-17
Payer: MEDICARE

## 2022-10-17 DIAGNOSIS — Z86.718 HX OF FEMORAL ARTERY THROMBOSIS: Primary | ICD-10-CM

## 2022-10-17 LAB
INTERNATIONAL NORMALIZATION RATIO, POC: 2.2
PROTHROMBIN TIME, POC: 26

## 2022-10-17 PROCEDURE — 85610 PROTHROMBIN TIME: CPT | Performed by: FAMILY MEDICINE

## 2022-10-24 ENCOUNTER — NURSE ONLY (OUTPATIENT)
Dept: FAMILY MEDICINE CLINIC | Age: 77
End: 2022-10-24
Payer: MEDICARE

## 2022-10-24 DIAGNOSIS — Z86.718 HX OF FEMORAL ARTERY THROMBOSIS: ICD-10-CM

## 2022-10-24 LAB
INTERNATIONAL NORMALIZATION RATIO, POC: 2.4
PROTHROMBIN TIME, POC: 29.2

## 2022-10-24 PROCEDURE — 85610 PROTHROMBIN TIME: CPT | Performed by: FAMILY MEDICINE

## 2022-10-28 ENCOUNTER — OFFICE VISIT (OUTPATIENT)
Dept: CARDIOLOGY CLINIC | Age: 77
End: 2022-10-28
Payer: MEDICARE

## 2022-10-28 VITALS
SYSTOLIC BLOOD PRESSURE: 134 MMHG | WEIGHT: 174 LBS | HEART RATE: 66 BPM | HEIGHT: 68 IN | OXYGEN SATURATION: 97 % | DIASTOLIC BLOOD PRESSURE: 64 MMHG | BODY MASS INDEX: 26.37 KG/M2

## 2022-10-28 DIAGNOSIS — I73.9 PAD (PERIPHERAL ARTERY DISEASE) (HCC): Primary | ICD-10-CM

## 2022-10-28 PROCEDURE — 99214 OFFICE O/P EST MOD 30 MIN: CPT | Performed by: INTERNAL MEDICINE

## 2022-10-28 PROCEDURE — G8484 FLU IMMUNIZE NO ADMIN: HCPCS | Performed by: INTERNAL MEDICINE

## 2022-10-28 PROCEDURE — G8417 CALC BMI ABV UP PARAM F/U: HCPCS | Performed by: INTERNAL MEDICINE

## 2022-10-28 PROCEDURE — G8427 DOCREV CUR MEDS BY ELIG CLIN: HCPCS | Performed by: INTERNAL MEDICINE

## 2022-10-28 PROCEDURE — 1036F TOBACCO NON-USER: CPT | Performed by: INTERNAL MEDICINE

## 2022-10-28 PROCEDURE — 3078F DIAST BP <80 MM HG: CPT | Performed by: INTERNAL MEDICINE

## 2022-10-28 PROCEDURE — 3074F SYST BP LT 130 MM HG: CPT | Performed by: INTERNAL MEDICINE

## 2022-10-28 PROCEDURE — 1123F ACP DISCUSS/DSCN MKR DOCD: CPT | Performed by: INTERNAL MEDICINE

## 2022-10-28 NOTE — PROGRESS NOTES
Aissatou Mcfarlane MD        OFFICE  FOLLOWUP NOTE    Chief complaints: patient is here for management of   HTN, dyslipidemia, PAD ( aorta-fempop bypass), tiredness, SLEEP apnea. GI bleeding with AVM H/O PUD    Subjective: patient feels better, no chest pain, no shortness of breath, no dizziness, no palpitations    HPI Terrial Fort is a 68 y. o.year old who  has a past medical history of Arthritis, CAD (coronary artery disease), H/O cardiac catheterization, History of blood clots, History of echocardiogram, History of nuclear stress test, Hx of Arterial Doppler ultrasound, Hx of blood clots, Hyperlipidemia, Hypertension, PAD (peripheral artery disease) ([facility] [address] and Venous malformation. and presents for management of   HTN, dyslipidemia, PAD ( aorta-fempop bypass), tiredness, SLEEP apnea. GI bleeding with AVM H/O PUD  which are well controlled      Current Outpatient Medications   Medication Sig Dispense Refill    tamsulosin (FLOMAX) 0.4 MG capsule Take 0.4 mg by mouth as needed      indomethacin (INDOCIN) 25 MG capsule Take 25 mg by mouth as needed for Pain      hydroCHLOROthiazide (HYDRODIURIL) 25 MG tablet Take 1 tablet by mouth every morning 90 tablet 1    metoprolol succinate (TOPROL XL) 100 MG extended release tablet Take 1 tablet by mouth daily 90 tablet 1    amLODIPine (NORVASC) 5 MG tablet Take 1 tablet by mouth daily 90 tablet 1    pravastatin (PRAVACHOL) 40 MG tablet Take 1 tablet by mouth daily 90 tablet 1    celecoxib (CELEBREX) 200 MG capsule Take 1 capsule by mouth daily 30 capsule 3    warfarin (COUMADIN) 1 MG tablet Take 4 tablets by mouth every morning 120 tablet 0    nitroGLYCERIN (NITROSTAT) 0.4 MG SL tablet Place 1 tablet under the tongue every 5 minutes as needed for Chest pain 25 tablet 3    NONFORMULARY 1 Dose every 3 months Pt gets Cortisone joint injection shots every 3 months in bilateral shoulders and Left hip, in [place]. Sports Medicine.  Dr. Anna Haas (VITAMIN D3) 2000 units TABS Take 1 tablet by mouth every morning       Magnesium 400 MG CAPS Take 1 capsule by mouth every morning       Aspirin Buf,WmJxe-EzUoe-LxSnn, (BUFFERED ASPIRIN) 325 MG TABS Take by mouth every morning       fenofibrate (TRICOR) 54 MG tablet Take 54 mg by mouth every morning s [facility] pharmacy: Please dispense generic fenofibrate unless prescriber denote       ascorbic acid (VITAMIN C) 500 MG tablet Take 500 mg by mouth every morning      vitamin E 400 UNIT capsule Take 400 Units by mouth every morning       Multiple Vitamins-Minerals (CENTRUM SILVER PO) Take 1 tablet by mouth every morning        No current facility-administered medications for this visit.      Allergies: Azithromycin  Past Medical History:   Diagnosis Date    Arthritis     CAD (coronary artery disease)     H/O cardiac catheterization 2013 or 2014    No stenting    History of blood clots 1995    History of echocardiogram 05/17/2019    EF 50-55%, Mild concentric left ventricular hypertrophy, sclerotic but non stenotic aortic valve, Mild thickening of anterior leaflet of mitral valve, Mild Pulm HTN    History of nuclear stress test 05/20/2019    Normal LVEF, normal tracer uptake in all myocardial segment during rest and stress, decreased uptake inferiorly due to diaphragmatic artifact    Hx of Arterial Doppler ultrasound 05/17/2019    Normal bilateral ABIs, Right distal SFA has 20-49% stenosis, diffuse plaquing noted throughout    Hx of blood clots     Hyperlipidemia     Hypertension     PAD (peripheral artery disease) (HCC)     Venous malformation 11/2019     Past Surgical History:   Procedure Laterality Date    APPENDECTOMY      COLONOSCOPY N/A 10/18/2018    COLONOSCOPY WITH BIOPSY performed by Rosita Shetty MD at [address] [place] [address], COLON, DIAGNOSTIC      2 weeks ago    ENDOSCOPY, COLON, DIAGNOSTIC  11/01/2017    egd w/ biopsy, healing duodenal ulcers w/o recurrent bleeding possible barretts, small hiatal hernia    ENDOSCOPY, COLON, DIAGNOSTIC  12/04/2019    enteroscopy, bx, cauterization, avm stomach. hiatal hernia, short segment barretts    FEMORAL BYPASS Bilateral 1995    aortal-femoral bypass surgery     LAPAROSCOPIC APPENDECTOMY  10/26/2016    and primary closure umbilical hernia    UPPER GASTROINTESTINAL ENDOSCOPY N/A 12/4/2019    ENTEROSCOPY WITH INTERVENTION apc cauterization avm upper body of stomach, and biopsy performed by Leandro Vasquez MD at [place] [id number]     Family History   Problem Relation Age of Onset    Cancer Mother     Heart Attack Father     Hypertension Father     Crohn's Disease Sister     Deep Vein Thrombosis Sister      Social History     Tobacco Use    Smoking status: Former     Packs/day: 0.75     Years: 50.00     Pack years: 37.50     Types: Cigars, Pipe, Cigarettes    Smokeless tobacco: Never    Tobacco comments:     patient states he vapes ; He gets his lung CA screening at the [place]   Substance Use Topics    Alcohol use: Yes     Alcohol/week: 3.0 standard drinks     Types: 2 Glasses of wine, 1 Cans of beer per week     Comment: caffeine: a cup coffee daily      [unfilled]  Review of Systems:   Constitutional: No Fever or Weight Loss   Eyes: No Decreased Vision  ENT: No Headaches, Hearing Loss or Vertigo  Cardiovascular: No chest pain, dyspnea on exertion, palpitations or loss of consciousness  Respiratory: No cough or wheezing    Gastrointestinal: No abdominal pain, appetite loss, blood in stools, constipation, diarrhea or heartburn  Genitourinary: No dysuria, trouble voiding, or hematuria  Musculoskeletal:  No gait disturbance, weakness or joint complaints  Integumentary: No rash or pruritis  Neurological: No TIA or stroke symptoms  Psychiatric: No anxiety or depression  Endocrine: No malaise, fatigue or temperature intolerance  Hematologic/Lymphatic: No bleeding problems, blood clots or swollen lymph nodes  Allergic/Immunologic: No nasal congestion or hives  All systems negative except as marked. Objective:  /64 (Site: Left Upper Arm, Position: Sitting, Cuff Size: Medium Adult)   Pulse 66   Ht 5' 8\" (1.727 m)   Wt 174 lb (78.9 kg)   SpO2 97%   BMI 26.46 kg/m²   Wt Readings from Last 3 Encounters:   10/28/22 174 lb (78.9 kg)   10/13/22 171 lb 6.4 oz (77.7 kg)   03/19/22 170 lb (77.1 kg)     Body mass index is 26.46 kg/m². GENERAL - Alert, oriented, pleasant, in no apparent distress,normal grooming  HEENT - pupils are intact, cornea intact, conjunctive normal color, ears are normal in exam,  Neck - Supple. No jugular venous distention noted. No carotid bruits, no apical -carotid delay  Respiratory:  Normal breath sounds, No respiratory distress, + wheezing, No chest tenderness. ,no use of accessory muscles, diaphragm movement is normal  Cardiovascular: (PMI) apex non displaced,no lifts no thrills, no s3,no s4, Normal heart rate, Normal rhythm, No murmurs, No rubs, No gallops. Carotid arteries pulse and amplitude are normal no bruit, no abdominal bruit noted ( normal abdominal aorta ausculation),   Extremities - No cyanosis, clubbing, or significant edema, no varicose veins    Abdomen - No masses, tenderness, or organomegaly, no hepato-splenomegally, no bruits  Musculoskeletal - No significant edema, no kyphosis or scoliosis, no deformity in any extremity noted, muscle strength and tone are normal  Skin: no ulcer,no scar,no stasis dermatitis   Neurologic - alert oriented times 3,Cranial nerves II through XII are grossly intact. There were no gross focal neurologic abnormalities.    Psychiatric: normal mood and affect    No results found for: CKTOTAL, CKMB, CKMBINDEX, TROPONINI  BNP:  No results found for: BNP  PT/INR:  No results found for: PTINR  No results found for: LABA1C  Lab Results   Component Value Date    CHOL 187 10/14/2022    TRIG 158 (H) 10/14/2022    HDL 56 10/14/2022    LDLCALC 99 10/14/2022    LDLDIRECT 143 (H) 08/26/2020     Lab Results   Component Value Date    ALT 14 10/14/2022 AST 24 10/14/2022     TSH:    Lab Results   Component Value Date/Time    TSH 1.250 08/26/2019 02:41 PM       Impression:  Mary Patrick is a 68 y. o.year old who  has a past medical history of Arthritis, CAD (coronary artery disease), H/O cardiac catheterization, History of blood clots, History of echocardiogram, History of nuclear stress test, Hx of Arterial Doppler ultrasound, Hx of blood clots, Hyperlipidemia, Hypertension, PAD (peripheral artery disease) ([address] and Venous malformation. and presents with     Plan:  GI BLEEDING, resolved, his hemoglobin in in 13( however, result is not in Epic). he has AVM on the EGD and PUD,CONTINUE COUMADIN  Wheezing: recommend to stop vaping  HTN: better off hctz/lisinopril, continue lopressor,  PAD: patient had aorta- to femoral graft clot, will continue coumadin and  normal LY noted, OFF  pletal as he has gi bleeing, last arterial doppler was in 2019, had 20-49% stenosis in rt SFA, patient is not interested in repeating the test.  Echo and stress test 2019 showed possible diaphragmatic artifact,AS HE CLAIMS TO HAVE HAVE MINI heart attacks and as per Dr. Jerson Garcia some blockage  Tiredness \" check tsh  Sleep apnea: recommend to follow up with pulmonary to adjust the CPAP, HE had it initially from [place]  dyslipidemia : continue statins and fenofibrates  Could not get records from PMD AS Dr. Dylon Morales maintenance: exerise and diet  All labs, medications and tests reviewed, continue all other medications of all above medical condition listed as is.     [unfilled]

## 2022-11-20 DIAGNOSIS — I10 ESSENTIAL HYPERTENSION: ICD-10-CM

## 2022-11-21 RX ORDER — AMLODIPINE BESYLATE 5 MG/1
5 TABLET ORAL DAILY
Qty: 90 TABLET | Refills: 1 | OUTPATIENT
Start: 2022-11-21

## 2022-11-29 ENCOUNTER — NURSE ONLY (OUTPATIENT)
Dept: FAMILY MEDICINE CLINIC | Age: 77
End: 2022-11-29
Payer: MEDICARE

## 2022-11-29 DIAGNOSIS — Z79.01 WARFARIN ANTICOAGULATION: ICD-10-CM

## 2022-11-29 DIAGNOSIS — I10 ESSENTIAL HYPERTENSION: ICD-10-CM

## 2022-11-29 DIAGNOSIS — I25.10 ARTERIOSCLEROSIS OF CORONARY ARTERY: Primary | ICD-10-CM

## 2022-11-29 DIAGNOSIS — Z86.718 HX OF FEMORAL ARTERY THROMBOSIS: ICD-10-CM

## 2022-11-29 LAB
INTERNATIONAL NORMALIZATION RATIO, POC: 2.1
PROTHROMBIN TIME, POC: 25

## 2022-11-29 PROCEDURE — 85610 PROTHROMBIN TIME: CPT | Performed by: FAMILY MEDICINE

## 2022-11-29 RX ORDER — FENOFIBRATE 54 MG/1
54 TABLET ORAL EVERY MORNING
Qty: 30 TABLET | Refills: 2 | Status: SHIPPED | OUTPATIENT
Start: 2022-11-29

## 2022-11-29 RX ORDER — WARFARIN SODIUM 1 MG/1
4 TABLET ORAL EVERY MORNING
Qty: 120 TABLET | Refills: 0 | Status: SHIPPED | OUTPATIENT
Start: 2022-11-29 | End: 2023-01-11 | Stop reason: SDUPTHER

## 2022-11-29 RX ORDER — AMLODIPINE BESYLATE 5 MG/1
5 TABLET ORAL DAILY
Qty: 90 TABLET | Refills: 1 | Status: SHIPPED | OUTPATIENT
Start: 2022-11-29

## 2022-11-29 NOTE — TELEPHONE ENCOUNTER
Pt called in stating that he needs a refill on his fenofibrate (TRICOR) 54 mg, and warfarin (COUMADIN) 4 mg & 1 mg.

## 2023-01-10 ENCOUNTER — NURSE ONLY (OUTPATIENT)
Dept: FAMILY MEDICINE CLINIC | Age: 78
End: 2023-01-10
Payer: MEDICARE

## 2023-01-10 DIAGNOSIS — Z79.01 WARFARIN ANTICOAGULATION: Primary | ICD-10-CM

## 2023-01-10 LAB
INTERNATIONAL NORMALIZATION RATIO, POC: 2.8
PROTHROMBIN TIME, POC: 33.2

## 2023-01-10 PROCEDURE — 85610 PROTHROMBIN TIME: CPT | Performed by: FAMILY MEDICINE

## 2023-01-11 ENCOUNTER — TELEPHONE (OUTPATIENT)
Dept: FAMILY MEDICINE CLINIC | Age: 78
End: 2023-01-11

## 2023-01-11 DIAGNOSIS — Z86.718 HX OF FEMORAL ARTERY THROMBOSIS: ICD-10-CM

## 2023-01-11 DIAGNOSIS — Z79.01 WARFARIN ANTICOAGULATION: ICD-10-CM

## 2023-01-11 RX ORDER — WARFARIN SODIUM 1 MG/1
TABLET ORAL
Qty: 30 TABLET | Refills: 0 | Status: SHIPPED | OUTPATIENT
Start: 2023-01-11 | End: 2023-03-06

## 2023-01-11 RX ORDER — WARFARIN SODIUM 2 MG/1
TABLET ORAL
Qty: 30 TABLET | Refills: 0 | Status: SHIPPED | OUTPATIENT
Start: 2023-01-11 | End: 2023-03-06

## 2023-01-11 RX ORDER — WARFARIN SODIUM 4 MG/1
4 TABLET ORAL DAILY
Qty: 30 TABLET | Refills: 1 | Status: SHIPPED | OUTPATIENT
Start: 2023-01-11

## 2023-01-11 NOTE — TELEPHONE ENCOUNTER
During lab visit yesterday patient stated He is almost out of Warfarin. He would like to have a 4mg tablet, 2 mg tablet and 1 mg tablet sent in to reduce amount of pills taken.

## 2023-01-11 NOTE — TELEPHONE ENCOUNTER
I sent the 3 prescriptions for warfarin.  This should allow flexibility as doses get adjusted from time to time.

## 2023-02-01 DIAGNOSIS — M15.9 PRIMARY OSTEOARTHRITIS INVOLVING MULTIPLE JOINTS: ICD-10-CM

## 2023-02-01 RX ORDER — CELECOXIB 200 MG/1
CAPSULE ORAL
Qty: 90 CAPSULE | Refills: 0 | Status: SHIPPED | OUTPATIENT
Start: 2023-02-01 | End: 2023-02-24 | Stop reason: SDUPTHER

## 2023-02-24 ENCOUNTER — OFFICE VISIT (OUTPATIENT)
Dept: FAMILY MEDICINE CLINIC | Age: 78
End: 2023-02-24

## 2023-02-24 VITALS
OXYGEN SATURATION: 95 % | BODY MASS INDEX: 26.58 KG/M2 | TEMPERATURE: 96.9 F | HEART RATE: 69 BPM | DIASTOLIC BLOOD PRESSURE: 70 MMHG | WEIGHT: 175.4 LBS | HEIGHT: 68 IN | SYSTOLIC BLOOD PRESSURE: 130 MMHG

## 2023-02-24 DIAGNOSIS — I10 ESSENTIAL HYPERTENSION: ICD-10-CM

## 2023-02-24 DIAGNOSIS — M15.9 PRIMARY OSTEOARTHRITIS INVOLVING MULTIPLE JOINTS: ICD-10-CM

## 2023-02-24 DIAGNOSIS — D68.59 HYPERCOAGULABLE STATE (HCC): Primary | ICD-10-CM

## 2023-02-24 DIAGNOSIS — Z79.01 WARFARIN ANTICOAGULATION: ICD-10-CM

## 2023-02-24 DIAGNOSIS — R97.20 RAISED PROSTATE SPECIFIC ANTIGEN: ICD-10-CM

## 2023-02-24 DIAGNOSIS — I73.9 PAD (PERIPHERAL ARTERY DISEASE) (HCC): ICD-10-CM

## 2023-02-24 DIAGNOSIS — E78.2 MIXED HYPERLIPIDEMIA: ICD-10-CM

## 2023-02-24 PROBLEM — M15.0 PRIMARY OSTEOARTHRITIS INVOLVING MULTIPLE JOINTS: Status: ACTIVE | Noted: 2021-05-13

## 2023-02-24 PROBLEM — M15.0 PRIMARY OSTEOARTHRITIS INVOLVING MULTIPLE JOINTS: Chronic | Status: ACTIVE | Noted: 2021-05-13

## 2023-02-24 LAB — INTERNATIONAL NORMALIZATION RATIO, POC: 3.1

## 2023-02-24 RX ORDER — HYDROCHLOROTHIAZIDE 25 MG/1
25 TABLET ORAL EVERY MORNING
Qty: 90 TABLET | Refills: 1 | Status: SHIPPED | OUTPATIENT
Start: 2023-02-24

## 2023-02-24 RX ORDER — PRAVASTATIN SODIUM 40 MG
40 TABLET ORAL DAILY
Qty: 90 TABLET | Refills: 1 | Status: SHIPPED | OUTPATIENT
Start: 2023-02-24

## 2023-02-24 RX ORDER — FENOFIBRATE 54 MG/1
54 TABLET ORAL EVERY MORNING
Qty: 90 TABLET | Refills: 2 | Status: SHIPPED | OUTPATIENT
Start: 2023-02-24

## 2023-02-24 RX ORDER — METOPROLOL SUCCINATE 100 MG/1
100 TABLET, EXTENDED RELEASE ORAL DAILY
Qty: 90 TABLET | Refills: 1 | Status: SHIPPED | OUTPATIENT
Start: 2023-02-24

## 2023-02-24 RX ORDER — CELECOXIB 200 MG/1
200 CAPSULE ORAL DAILY
Qty: 90 CAPSULE | Refills: 1 | Status: SHIPPED | OUTPATIENT
Start: 2023-02-24

## 2023-02-24 ASSESSMENT — ENCOUNTER SYMPTOMS: BACK PAIN: 1

## 2023-02-24 NOTE — ASSESSMENT & PLAN NOTE
He will make adjustments to his supplements with the goal of getting his osteoarthritis under optimal control and will subsequently be monitoring INR frequently and make adjustments to warfarin dose if required. His INR today is perfect at 3.1. He will continue the usual 4 mg 3 mg alternating days that he has been doing.

## 2023-02-24 NOTE — ASSESSMENT & PLAN NOTE
He will have more frequent than usual INR checks since he will be adjusting supplements including turmeric, black pepper, and other changes in diet with smoothies which may impact the potency of the warfarin he takes. I suggested that he get checked at least once a month. I suggested that he get an INR checked if he starts with bruising or bleeding more than usual or if he starts bruising less than usual or at any time that he feels that a check should be done.

## 2023-02-24 NOTE — ASSESSMENT & PLAN NOTE
Continue celecoxib for osteoarthritis. He may adjust supplements and possibly make adjustments to his warfarin dose as needed.

## 2023-02-24 NOTE — PROGRESS NOTES
2/24/23    Bakari Sharma  1945    TYREE COMBS - Buzz Manriquez is a 68 y.o. male who presents today for evaluation of:  Chief Complaint   Patient presents with    Follow-up     OA      OA:    he was eating a lot of smoothies for health and tumeric and garlic powder and black pepper. He has found benefit in controlling his osteoarthritis pain with the smoothies but he is concerned that they will affect his INR and he will need to more closely monitor his INR and possibly adjust his warfarin dose. Since he has a significant hypercoagulable state his INR goal is 2.5-3.5. Celecoxib has definitely been helpful for him in reducing osteoarthritis pain. High PSA: Monitored by urologist.     PAD : Pain hips and back limit walking and exercise. Review of Systems   Cardiovascular:  Negative for chest pain and leg swelling. Musculoskeletal:  Positive for arthralgias and back pain. Allergies   Allergen Reactions    Azithromycin Diarrhea and Nausea And Vomiting     Diarrhea and coffee ground vomiting. OBJECTIVE    /70 (Site: Left Upper Arm, Position: Sitting, Cuff Size: Large Adult)   Pulse 69   Temp 96.9 °F (36.1 °C) (Infrared)   Ht 5' 8\" (1.727 m)   Wt 175 lb 6.4 oz (79.6 kg)   SpO2 95%   BMI 26.67 kg/m²     Physical Exam   Constitutional:       General: Not in acute distress. Appearance: Normal appearance. Not ill-appearing. Eyes:      General: No scleral icterus. Cardiovascular:      Rate and Rhythm: Normal rate and regular rhythm. Heart sounds: No murmur heard. No friction rub. No gallop. Pulmonary:      Effort: Pulmonary effort is normal. No respiratory distress. Breath sounds: No wheezing, rhonchi or rales. Abdominal:      Palpations: Abdomen is soft. There is no mass. Tenderness: There is no abdominal tenderness. Musculoskeletal:     Moves all extremities normally. Skin:     General: Skin is warm. Coloration: Skin is not jaundiced. Neurological:      Mental Status: Patient is alert. Psychiatric:         Behavior: Behavior normal.         Thought Content: Thought content normal.         Judgment: Judgment normal.     Point-of-care INR today is 3.1. ASSESSMENT/PLAN:    1. Hypercoagulable state ([address]  Assessment & Plan:   He will have more frequent than usual INR checks since he will be adjusting supplements including turmeric, black pepper, and other changes in diet with smoothies which may impact the potency of the warfarin he takes. I suggested that he get checked at least once a month. I suggested that he get an INR checked if he starts with bruising or bleeding more than usual or if he starts bruising less than usual or at any time that he feels that a check should be done. Orders:  -     ENROLLMENT FOR ANTICOAGULATION MONITORING  -     POCT INR; Standing  -     POCT INR  2. Primary osteoarthritis involving multiple joints  Assessment & Plan:   Continue celecoxib for osteoarthritis. He may adjust supplements and possibly make adjustments to his warfarin dose as needed. Orders:  -     celecoxib (CELEBREX) 200 MG capsule; Take 1 capsule by mouth daily, Disp-90 capsule, R-1Normal  -     POCT INR  3. HTN, Essential hypertension  -     hydroCHLOROthiazide (HYDRODIURIL) 25 MG tablet; Take 1 tablet by mouth every morning, Disp-90 tablet, R-1Normal  -     metoprolol succinate (TOPROL XL) 100 MG extended release tablet; Take 1 tablet by mouth daily, Disp-90 tablet, R-1Normal  -     POCT INR  4. PAD (peripheral artery disease) ([facility])  Assessment & Plan:   I encouraged exercising with his legs. Since he is limited by hip and back pain I suggested that he consider doing soleus push-ups. I demonstrated them. Orders:  -     POCT INR  5.  Warfarin anticoagulation  Assessment & Plan:   He will make adjustments to his supplements with the goal of getting his osteoarthritis under optimal control and will subsequently be monitoring INR frequently and make adjustments to warfarin dose if required. His INR today is perfect at 3.1. He will continue the usual 4 mg 3 mg alternating days that he has been doing. Orders:  -     ENROLLMENT FOR ANTICOAGULATION MONITORING  -     POCT INR; Standing  -     POCT INR  6. HLD, Mixed hyperlipidemia  -     pravastatin (PRAVACHOL) 40 MG tablet; Take 1 tablet by mouth daily, Disp-90 tablet, R-1Normal  -     fenofibrate (TRICOR) 54 MG tablet; Take 1 tablet by mouth every morning [facility] pharmacy: Please dispense generic fenofibrate unless prescriber denote, Disp-90 tablet, R-2Normal  -     POCT INR  7.  Raised prostate specific antigen  Assessment & Plan:   Raised PSA is monitored by a urologist.  Orders:  -     POCT INR        Return in about 8 months (around 10/24/2023) for AWV-Mcr.   Nile Lucas MD

## 2023-02-24 NOTE — ASSESSMENT & PLAN NOTE
I encouraged exercising with his legs. Since he is limited by hip and back pain I suggested that he consider doing soleus push-ups. I demonstrated them.

## 2023-03-04 DIAGNOSIS — Z86.718 HX OF FEMORAL ARTERY THROMBOSIS: ICD-10-CM

## 2023-03-04 DIAGNOSIS — Z79.01 WARFARIN ANTICOAGULATION: ICD-10-CM

## 2023-03-06 RX ORDER — WARFARIN SODIUM 1 MG/1
TABLET ORAL
Qty: 30 TABLET | Refills: 0 | Status: SHIPPED | OUTPATIENT
Start: 2023-03-06

## 2023-03-06 RX ORDER — WARFARIN SODIUM 2 MG/1
TABLET ORAL
Qty: 30 TABLET | Refills: 0 | Status: SHIPPED | OUTPATIENT
Start: 2023-03-06

## 2023-04-05 ENCOUNTER — HOSPITAL ENCOUNTER (OUTPATIENT)
Age: 78
Discharge: HOME | End: 2023-04-05
Payer: MEDICARE

## 2023-04-05 LAB — PSA ULTRASENSITIVE: 5.08 NG/ML (ref 0–4)

## 2023-04-05 PROCEDURE — 84153 ASSAY OF PSA TOTAL: CPT

## 2023-04-05 PROCEDURE — 36415 COLL VENOUS BLD VENIPUNCTURE: CPT

## 2023-04-27 DIAGNOSIS — Z86.718 HX OF FEMORAL ARTERY THROMBOSIS: ICD-10-CM

## 2023-04-27 DIAGNOSIS — Z79.01 WARFARIN ANTICOAGULATION: ICD-10-CM

## 2023-04-29 RX ORDER — WARFARIN SODIUM 4 MG/1
TABLET ORAL
Qty: 7 TABLET | Refills: 0 | Status: SHIPPED | OUTPATIENT
Start: 2023-04-29 | End: 2023-05-02 | Stop reason: SDUPTHER

## 2023-05-02 ENCOUNTER — NURSE ONLY (OUTPATIENT)
Dept: FAMILY MEDICINE CLINIC | Age: 78
End: 2023-05-02
Payer: MEDICARE

## 2023-05-02 DIAGNOSIS — Z86.718 HX OF FEMORAL ARTERY THROMBOSIS: ICD-10-CM

## 2023-05-02 DIAGNOSIS — D68.59 HYPERCOAGULABLE STATE (HCC): ICD-10-CM

## 2023-05-02 DIAGNOSIS — Z79.01 WARFARIN ANTICOAGULATION: Primary | ICD-10-CM

## 2023-05-02 DIAGNOSIS — Z79.01 WARFARIN ANTICOAGULATION: ICD-10-CM

## 2023-05-02 LAB
INTERNATIONAL NORMALIZATION RATIO, POC: 2
PROTHROMBIN TIME, POC: 23.5

## 2023-05-02 PROCEDURE — 85610 PROTHROMBIN TIME: CPT | Performed by: FAMILY MEDICINE

## 2023-05-02 RX ORDER — WARFARIN SODIUM 4 MG/1
4 TABLET ORAL EVERY OTHER DAY
Qty: 30 TABLET | Refills: 0 | Status: SHIPPED | OUTPATIENT
Start: 2023-05-02

## 2023-05-02 RX ORDER — WARFARIN SODIUM 3 MG/1
3 TABLET ORAL EVERY OTHER DAY
Qty: 30 TABLET | Refills: 0 | Status: SHIPPED | OUTPATIENT
Start: 2023-05-02

## 2023-05-06 DIAGNOSIS — Z79.01 WARFARIN ANTICOAGULATION: ICD-10-CM

## 2023-05-06 DIAGNOSIS — Z86.718 HX OF FEMORAL ARTERY THROMBOSIS: ICD-10-CM

## 2023-05-08 RX ORDER — WARFARIN SODIUM 1 MG/1
TABLET ORAL
Qty: 30 TABLET | Refills: 0 | OUTPATIENT
Start: 2023-05-08

## 2023-05-09 DIAGNOSIS — Z79.01 WARFARIN ANTICOAGULATION: ICD-10-CM

## 2023-05-09 DIAGNOSIS — Z86.718 HX OF FEMORAL ARTERY THROMBOSIS: ICD-10-CM

## 2023-05-09 RX ORDER — WARFARIN SODIUM 1 MG/1
TABLET ORAL
Qty: 30 TABLET | Refills: 0 | OUTPATIENT
Start: 2023-05-09

## 2023-06-13 DIAGNOSIS — I10 ESSENTIAL HYPERTENSION: ICD-10-CM

## 2023-06-14 RX ORDER — AMLODIPINE BESYLATE 5 MG/1
TABLET ORAL
Qty: 30 TABLET | Refills: 0 | Status: SHIPPED | OUTPATIENT
Start: 2023-06-14 | End: 2023-07-11

## 2023-06-19 DIAGNOSIS — Z79.01 WARFARIN ANTICOAGULATION: ICD-10-CM

## 2023-06-19 DIAGNOSIS — Z86.718 HX OF FEMORAL ARTERY THROMBOSIS: ICD-10-CM

## 2023-06-19 RX ORDER — WARFARIN SODIUM 3 MG/1
TABLET ORAL
Qty: 30 TABLET | Refills: 0 | OUTPATIENT
Start: 2023-06-19

## 2023-06-20 ENCOUNTER — NURSE ONLY (OUTPATIENT)
Dept: FAMILY MEDICINE CLINIC | Age: 78
End: 2023-06-20

## 2023-06-20 LAB
INTERNATIONAL NORMALIZATION RATIO, POC: 3.6
PROTHROMBIN TIME, POC: 43.3

## 2023-06-20 NOTE — PROGRESS NOTES
Patient came in for an INR. Patients INR was taken from left index finger.    Warfarin dose: 3 MG every other day and 4 MG on opposite days   INR- 3.6- 43.3 sec

## 2023-06-21 ENCOUNTER — TELEPHONE (OUTPATIENT)
Dept: FAMILY MEDICINE CLINIC | Age: 78
End: 2023-06-21

## 2023-06-21 NOTE — TELEPHONE ENCOUNTER
Patient called asking if there was any changes to coumadin dose (visit 6/20/2023) he is needing a refill of Coumadin  Please advise

## 2023-06-22 DIAGNOSIS — Z86.718 HX OF FEMORAL ARTERY THROMBOSIS: ICD-10-CM

## 2023-06-22 DIAGNOSIS — Z79.01 WARFARIN ANTICOAGULATION: ICD-10-CM

## 2023-06-22 DIAGNOSIS — D68.59 HYPERCOAGULABLE STATE (HCC): ICD-10-CM

## 2023-06-22 DIAGNOSIS — Z79.01 WARFARIN ANTICOAGULATION: Primary | ICD-10-CM

## 2023-06-22 PROCEDURE — 85610 PROTHROMBIN TIME: CPT | Performed by: FAMILY MEDICINE

## 2023-06-22 RX ORDER — WARFARIN SODIUM 3 MG/1
3 TABLET ORAL DAILY
Qty: 30 TABLET | Refills: 0 | Status: SHIPPED | OUTPATIENT
Start: 2023-06-22

## 2023-06-22 NOTE — TELEPHONE ENCOUNTER
Spoke with Dr. Juanito Sparrow orders for coumadin 3 mg every day and recheck INR 1-2 weeks. Coumadin 3 mg refilled.   Left message to call office

## 2023-06-29 ENCOUNTER — NURSE ONLY (OUTPATIENT)
Dept: FAMILY MEDICINE CLINIC | Age: 78
End: 2023-06-29
Payer: MEDICARE

## 2023-06-29 ENCOUNTER — TELEPHONE (OUTPATIENT)
Dept: FAMILY MEDICINE CLINIC | Age: 78
End: 2023-06-29

## 2023-06-29 DIAGNOSIS — Z79.01 WARFARIN ANTICOAGULATION: ICD-10-CM

## 2023-06-29 DIAGNOSIS — Z86.718 HX OF FEMORAL ARTERY THROMBOSIS: ICD-10-CM

## 2023-06-29 DIAGNOSIS — D68.59 HYPERCOAGULABLE STATE (HCC): Chronic | ICD-10-CM

## 2023-06-29 DIAGNOSIS — Z79.01 WARFARIN ANTICOAGULATION: Primary | ICD-10-CM

## 2023-06-29 LAB
INTERNATIONAL NORMALIZATION RATIO, POC: 1.9
PROTHROMBIN TIME, POC: 22.8

## 2023-06-29 PROCEDURE — 85610 PROTHROMBIN TIME: CPT | Performed by: FAMILY MEDICINE

## 2023-06-29 RX ORDER — WARFARIN SODIUM 4 MG/1
4 TABLET ORAL WEEKLY
Qty: 30 TABLET | Refills: 0 | Status: SHIPPED | OUTPATIENT
Start: 2023-06-29

## 2023-07-11 DIAGNOSIS — I10 ESSENTIAL HYPERTENSION: ICD-10-CM

## 2023-07-11 RX ORDER — AMLODIPINE BESYLATE 5 MG/1
TABLET ORAL
Qty: 90 TABLET | Refills: 0 | Status: SHIPPED | OUTPATIENT
Start: 2023-07-11

## 2023-07-11 NOTE — TELEPHONE ENCOUNTER
He ought to make katherine appointment in about 2 months. I don't want him going all the way from Feb to Feb 2024 w/o an appt. I'll send a 90 d rx.

## 2023-07-21 ENCOUNTER — NURSE ONLY (OUTPATIENT)
Dept: FAMILY MEDICINE CLINIC | Age: 78
End: 2023-07-21
Payer: MEDICARE

## 2023-07-21 DIAGNOSIS — Z79.01 WARFARIN ANTICOAGULATION: Primary | ICD-10-CM

## 2023-07-21 LAB
INTERNATIONAL NORMALIZATION RATIO, POC: 2.5
PROTHROMBIN TIME, POC: 29.9

## 2023-07-21 PROCEDURE — 85610 PROTHROMBIN TIME: CPT | Performed by: FAMILY MEDICINE

## 2023-07-24 DIAGNOSIS — Z79.01 WARFARIN ANTICOAGULATION: ICD-10-CM

## 2023-07-24 DIAGNOSIS — Z86.718 HX OF FEMORAL ARTERY THROMBOSIS: ICD-10-CM

## 2023-07-24 RX ORDER — WARFARIN SODIUM 3 MG/1
TABLET ORAL
Qty: 30 TABLET | Refills: 1 | Status: SHIPPED | OUTPATIENT
Start: 2023-07-24

## 2023-09-21 NOTE — ASSESSMENT & PLAN NOTE
--At or near goal No     -He is to continue current medications (fenofibrate 54 mg, pravastatin 40 mg) Hepatic function panel WNL. No abdominal pain. No myalgias.     -The nature of cardiac risk has been fully discussed with this patient. I have made him aware of his LDL target goal given his cardiovascular risk analysis. I have discussed the appropriate diet. The need for lifelong compliance in order to reduce risk is stressed. A regular exercise program is recommended to help achieve and maintain normal body weight, fitness and improve lipid balance. A written copy of a low fat, low cholesterol diet has been given to the patient. Posterior Auricular Interpolation Flap Text: A decision was made to reconstruct the defect utilizing an interpolation axial flap and a staged reconstruction.  A telfa template was made of the defect.  This telfa template was then used to outline the posterior auricular interpolation flap.  The donor area for the pedicle flap was then injected with anesthesia.  The flap was excised through the skin and subcutaneous tissue down to the layer of the underlying musculature.  The pedicle flap was carefully excised within this deep plane to maintain its blood supply.  The edges of the donor site were undermined.   The donor site was closed in a primary fashion.  The pedicle was then rotated into position and sutured.  Once the tube was sutured into place, adequate blood supply was confirmed with blanching and refill.  The pedicle was then wrapped with xeroform gauze and dressed appropriately with a telfa and gauze bandage to ensure continued blood supply and protect the attached pedicle.

## 2023-10-08 DIAGNOSIS — E78.2 MIXED HYPERLIPIDEMIA: ICD-10-CM

## 2023-10-08 DIAGNOSIS — Z86.718 HX OF FEMORAL ARTERY THROMBOSIS: ICD-10-CM

## 2023-10-08 DIAGNOSIS — I10 ESSENTIAL HYPERTENSION: ICD-10-CM

## 2023-10-08 DIAGNOSIS — Z79.01 WARFARIN ANTICOAGULATION: ICD-10-CM

## 2023-10-09 RX ORDER — WARFARIN SODIUM 3 MG/1
TABLET ORAL
Qty: 30 TABLET | Refills: 0 | Status: SHIPPED | OUTPATIENT
Start: 2023-10-09

## 2023-10-09 RX ORDER — METOPROLOL SUCCINATE 100 MG/1
100 TABLET, EXTENDED RELEASE ORAL DAILY
Qty: 90 TABLET | Refills: 0 | Status: SHIPPED | OUTPATIENT
Start: 2023-10-09

## 2023-10-09 RX ORDER — PRAVASTATIN SODIUM 40 MG
40 TABLET ORAL DAILY
Qty: 90 TABLET | Refills: 0 | Status: SHIPPED | OUTPATIENT
Start: 2023-10-09

## 2023-10-09 RX ORDER — HYDROCHLOROTHIAZIDE 25 MG/1
25 TABLET ORAL EVERY MORNING
Qty: 90 TABLET | Refills: 0 | Status: SHIPPED | OUTPATIENT
Start: 2023-10-09

## 2023-10-10 DIAGNOSIS — I10 ESSENTIAL HYPERTENSION: ICD-10-CM

## 2023-10-10 RX ORDER — AMLODIPINE BESYLATE 5 MG/1
TABLET ORAL
Qty: 90 TABLET | Refills: 0 | Status: SHIPPED | OUTPATIENT
Start: 2023-10-10

## 2023-10-10 NOTE — TELEPHONE ENCOUNTER
----- Message from Zachary Pope sent at 10/10/2023 10:09 AM EDT -----  Subject: Refill Request    QUESTIONS  Name of Medication? amLODIPine (NORVASC) 5 MG tablet  Patient-reported dosage and instructions? once daily  How many days do you have left? 7  Preferred Pharmacy? [facility]  Pharmacy phone number (if available)? [phone]  Additional Information for Provider? 90 day refill   ---------------------------------------------------------------------------  --------------  CALL BACK INFO  What is the best way for the office to contact you? OK to leave message on   voicemail  Preferred Call Back Phone Number? [phone]  ---------------------------------------------------------------------------  --------------  SCRIPT ANSWERS  Relationship to Patient?  Self

## 2023-10-26 DIAGNOSIS — M15.9 PRIMARY OSTEOARTHRITIS INVOLVING MULTIPLE JOINTS: ICD-10-CM

## 2023-10-26 RX ORDER — CELECOXIB 200 MG/1
200 CAPSULE ORAL DAILY
Qty: 30 CAPSULE | Refills: 0 | Status: SHIPPED | OUTPATIENT
Start: 2023-10-26 | End: 2023-11-20

## 2023-10-30 ENCOUNTER — OFFICE VISIT (OUTPATIENT)
Dept: CARDIOLOGY CLINIC | Age: 78
End: 2023-10-30
Payer: MEDICARE

## 2023-10-30 VITALS
DIASTOLIC BLOOD PRESSURE: 72 MMHG | BODY MASS INDEX: 24.91 KG/M2 | WEIGHT: 174 LBS | OXYGEN SATURATION: 94 % | HEART RATE: 76 BPM | HEIGHT: 70 IN | SYSTOLIC BLOOD PRESSURE: 130 MMHG

## 2023-10-30 DIAGNOSIS — I10 PRIMARY HYPERTENSION: ICD-10-CM

## 2023-10-30 DIAGNOSIS — I73.9 PAD (PERIPHERAL ARTERY DISEASE) (HCC): Primary | ICD-10-CM

## 2023-10-30 DIAGNOSIS — I51.7 LVH (LEFT VENTRICULAR HYPERTROPHY): ICD-10-CM

## 2023-10-30 DIAGNOSIS — I20.9 ANGINA PECTORIS, UNSPECIFIED (HCC): ICD-10-CM

## 2023-10-30 PROBLEM — I25.119 ATHEROSCLEROTIC HEART DISEASE OF NATIVE CORONARY ARTERY WITH UNSPECIFIED ANGINA PECTORIS (HCC): Status: ACTIVE | Noted: 2023-10-30

## 2023-10-30 PROCEDURE — 1036F TOBACCO NON-USER: CPT | Performed by: INTERNAL MEDICINE

## 2023-10-30 PROCEDURE — 3075F SYST BP GE 130 - 139MM HG: CPT | Performed by: INTERNAL MEDICINE

## 2023-10-30 PROCEDURE — 93000 ELECTROCARDIOGRAM COMPLETE: CPT | Performed by: INTERNAL MEDICINE

## 2023-10-30 PROCEDURE — G8427 DOCREV CUR MEDS BY ELIG CLIN: HCPCS | Performed by: INTERNAL MEDICINE

## 2023-10-30 PROCEDURE — 1123F ACP DISCUSS/DSCN MKR DOCD: CPT | Performed by: INTERNAL MEDICINE

## 2023-10-30 PROCEDURE — G8484 FLU IMMUNIZE NO ADMIN: HCPCS | Performed by: INTERNAL MEDICINE

## 2023-10-30 PROCEDURE — 99214 OFFICE O/P EST MOD 30 MIN: CPT | Performed by: INTERNAL MEDICINE

## 2023-10-30 PROCEDURE — G8420 CALC BMI NORM PARAMETERS: HCPCS | Performed by: INTERNAL MEDICINE

## 2023-10-30 PROCEDURE — 3078F DIAST BP <80 MM HG: CPT | Performed by: INTERNAL MEDICINE

## 2023-10-30 NOTE — PATIENT INSTRUCTIONS
**It is YOUR responsibilty to bring medication bottles and/or updated medication list to [facility]. This will allow us to better serve you and all your healthcare needs**   [facility] Locations - No appointment necessary. Sites open Monday to Friday. Call your preferred location for test preparation, business   hours and other information you need. [employer] accepts 's. [address] [place] [address]. [address] [place], [address]  Phone: [phone]    Thank you for allowing us to care for you today! We want to ensure we can follow your treatment plan and we strive to give you the best outcomes and experience possible. If you ever have a life threatening emergency and call 911 - for an ambulance (EMS)   Our providers can only care for you at:   [facility] or [facility] ED. Even if you have someone take you or you drive yourself we can only care for you in a [facility]. Our providers are not setup at the other healthcare locations! Please be informed that if you contact our office outside of normal business hours the physician on call cannot help with any scheduling or rescheduling issues, procedure instruction questions or any type of medication issue. We advise you for any urgent/emergency that you go to the nearest emergency room! PLEASE CALL OUR OFFICE DURING NORMAL BUSINESS HOURS    Monday - Friday   8 am to 5 pm    [place]: [address] [place]: [phone]    [place]:  [phone]  We are committed to providing you the best care possible. If you receive a survey after visiting one of our offices, please take time to share your experience concerning your physician office visit. These surveys are confidential and no health information about you is shared. We are eager to improve for you and we are counting on your feedback to help make that happen.

## 2023-10-30 NOTE — PROGRESS NOTES
Nate Bautista MD        OFFICE  FOLLOWUP NOTE    Chief complaints: patient is here for management of  HTN, dyslipidemia, PAD ( aorta-fempop bypass), tiredness, SLEEP apnea. GI bleeding with AVM H/O PUD      Subjective: patient feels better, no chest pain, no shortness of breath, no dizziness, no palpitations    HPI Lus Cheadle is a 66 y. o.year old who  has a past medical history of Arthritis, CAD (coronary artery disease), H/O cardiac catheterization, History of blood clots, History of echocardiogram, History of nuclear stress test, Hx of Arterial Doppler ultrasound, Hx of blood clots, Hyperlipidemia, Hypertension, PAD (peripheral artery disease) ([address]), and Venous malformation. and presents for management of  HTN, dyslipidemia, PAD ( aorta-fempop bypass), tiredness, SLEEP apnea. GI bleeding with AVM H/O PUD which are well controlled      Current Outpatient Medications   Medication Sig Dispense Refill    celecoxib (CELEBREX) 200 MG capsule Take 1 capsule by mouth once daily 30 capsule 0    amLODIPine (NORVASC) 5 MG tablet Take 1 tablet by mouth once daily 90 tablet 0    warfarin (COUMADIN) 3 MG tablet TAKE 1 TABLET(3MG)BY MOUTH 6 DAYS A WEEK, AND TAKE 4MG BY MOUTH THE OTHER DAY AS DIRECTED 30 tablet 0    pravastatin (PRAVACHOL) 40 MG tablet Take 1 tablet by mouth once daily 90 tablet 0    metoprolol succinate (TOPROL XL) 100 MG extended release tablet Take 1 tablet by mouth once daily 90 tablet 0    hydroCHLOROthiazide (HYDRODIURIL) 25 MG tablet TAKE 1 TABLET BY MOUTH ONCE DAILY IN THE MORNING 90 tablet 0    warfarin (COUMADIN) 4 MG tablet Take 1 tablet by mouth once a week And take 3 mg 6 days each week.  30 tablet 0    fenofibrate (TRICOR) 54 MG tablet Take 1 tablet by mouth every morning [facility] pharmacy: Please dispense generic fenofibrate unless prescriber denote 90 tablet 2    nitroGLYCERIN (NITROSTAT) 0.4 MG SL tablet Place 1 tablet under the tongue every 5 minutes as needed for Chest pain 25

## 2023-11-09 DIAGNOSIS — Z79.01 WARFARIN ANTICOAGULATION: ICD-10-CM

## 2023-11-09 DIAGNOSIS — Z86.718 HX OF FEMORAL ARTERY THROMBOSIS: ICD-10-CM

## 2023-11-10 RX ORDER — WARFARIN SODIUM 4 MG/1
4 TABLET ORAL DAILY
Qty: 7 TABLET | Refills: 0 | Status: SHIPPED | OUTPATIENT
Start: 2023-11-10 | End: 2023-12-02

## 2023-11-13 ENCOUNTER — NURSE ONLY (OUTPATIENT)
Dept: FAMILY MEDICINE CLINIC | Age: 78
End: 2023-11-13
Payer: MEDICARE

## 2023-11-13 DIAGNOSIS — Z79.01 WARFARIN ANTICOAGULATION: Primary | ICD-10-CM

## 2023-11-13 DIAGNOSIS — Z79.01 WARFARIN ANTICOAGULATION: ICD-10-CM

## 2023-11-13 DIAGNOSIS — Z86.718 HX OF FEMORAL ARTERY THROMBOSIS: ICD-10-CM

## 2023-11-13 LAB
INTERNATIONAL NORMALIZATION RATIO, POC: 1.8
PROTHROMBIN TIME, POC: 22.1

## 2023-11-13 PROCEDURE — 85610 PROTHROMBIN TIME: CPT | Performed by: FAMILY MEDICINE

## 2023-11-13 RX ORDER — WARFARIN SODIUM 3 MG/1
TABLET ORAL
Qty: 30 TABLET | Refills: 0 | Status: SHIPPED | OUTPATIENT
Start: 2023-11-13

## 2023-11-16 ENCOUNTER — TELEPHONE (OUTPATIENT)
Dept: CARDIOLOGY CLINIC | Age: 78
End: 2023-11-16

## 2023-11-16 NOTE — TELEPHONE ENCOUNTER
Called and left pt a voicemail to call office back to get Echo test results . Left Ventricle: Normal left ventricular systolic function with a visually estimated EF of 55 - 60%. Left ventricle size is normal. Normal wall thickness. Normal wall motion. Normal diastolic function. Mitral Valve: Thickened leaflet. Mild to moderate regurgitation. Tricuspid Valve: Mild to moderate regurgitation. Moderate Pulmonary Hypertension with  RVSP is 48 mmHg. Pericardium: No pericardial effusion.

## 2023-11-17 ENCOUNTER — TELEPHONE (OUTPATIENT)
Dept: CARDIOLOGY CLINIC | Age: 78
End: 2023-11-17

## 2023-11-17 NOTE — TELEPHONE ENCOUNTER
Spoke with pt about results , pt was in some understanding and will call back with any other questions he may have later . Left Ventricle: Normal left ventricular systolic function with a visually estimated EF of 55 - 60%. Left ventricle size is normal. Normal wall thickness. Normal wall motion. Normal diastolic function. Mitral Valve: Thickened leaflet. Mild to moderate regurgitation. Tricuspid Valve: Mild to moderate regurgitation. Moderate Pulmonary Hypertension with  RVSP is 48 mmHg. Pericardium: No pericardial effusion.

## 2023-11-19 DIAGNOSIS — M15.9 PRIMARY OSTEOARTHRITIS INVOLVING MULTIPLE JOINTS: ICD-10-CM

## 2023-11-20 RX ORDER — CELECOXIB 200 MG/1
200 CAPSULE ORAL DAILY
Qty: 90 CAPSULE | Refills: 0 | Status: ON HOLD | OUTPATIENT
Start: 2023-11-20 | End: 2023-12-28 | Stop reason: HOSPADM

## 2023-12-01 DIAGNOSIS — Z86.718 HX OF FEMORAL ARTERY THROMBOSIS: ICD-10-CM

## 2023-12-01 DIAGNOSIS — Z79.01 WARFARIN ANTICOAGULATION: ICD-10-CM

## 2023-12-02 RX ORDER — WARFARIN SODIUM 4 MG/1
TABLET ORAL
Qty: 30 TABLET | Refills: 0 | Status: SHIPPED | OUTPATIENT
Start: 2023-12-02

## 2023-12-11 SDOH — ECONOMIC STABILITY: INCOME INSECURITY: HOW HARD IS IT FOR YOU TO PAY FOR THE VERY BASICS LIKE FOOD, HOUSING, MEDICAL CARE, AND HEATING?: NOT HARD AT ALL

## 2023-12-11 SDOH — ECONOMIC STABILITY: FOOD INSECURITY: WITHIN THE PAST 12 MONTHS, THE FOOD YOU BOUGHT JUST DIDN'T LAST AND YOU DIDN'T HAVE MONEY TO GET MORE.: NEVER TRUE

## 2023-12-11 SDOH — HEALTH STABILITY: PHYSICAL HEALTH: ON AVERAGE, HOW MANY DAYS PER WEEK DO YOU ENGAGE IN MODERATE TO STRENUOUS EXERCISE (LIKE A BRISK WALK)?: 0 DAYS

## 2023-12-11 SDOH — ECONOMIC STABILITY: FOOD INSECURITY: WITHIN THE PAST 12 MONTHS, YOU WORRIED THAT YOUR FOOD WOULD RUN OUT BEFORE YOU GOT MONEY TO BUY MORE.: NEVER TRUE

## 2023-12-11 ASSESSMENT — LIFESTYLE VARIABLES
HOW OFTEN DURING THE LAST YEAR HAVE YOU HAD A FEELING OF GUILT OR REMORSE AFTER DRINKING: NEVER
HOW OFTEN DURING THE LAST YEAR HAVE YOU FAILED TO DO WHAT WAS NORMALLY EXPECTED FROM YOU BECAUSE OF DRINKING: 0
HOW MANY STANDARD DRINKS CONTAINING ALCOHOL DO YOU HAVE ON A TYPICAL DAY: 1 OR 2
HOW OFTEN DURING THE LAST YEAR HAVE YOU BEEN UNABLE TO REMEMBER WHAT HAPPENED THE NIGHT BEFORE BECAUSE YOU HAD BEEN DRINKING: 0
HOW OFTEN DURING THE LAST YEAR HAVE YOU FAILED TO DO WHAT WAS NORMALLY EXPECTED FROM YOU BECAUSE OF DRINKING: NEVER
HOW MANY STANDARD DRINKS CONTAINING ALCOHOL DO YOU HAVE ON A TYPICAL DAY: 1
HOW OFTEN DURING THE LAST YEAR HAVE YOU HAD A FEELING OF GUILT OR REMORSE AFTER DRINKING: 0
HOW OFTEN DO YOU HAVE A DRINK CONTAINING ALCOHOL: 2-4 TIMES A MONTH
HOW OFTEN DURING THE LAST YEAR HAVE YOU FOUND THAT YOU WERE NOT ABLE TO STOP DRINKING ONCE YOU HAD STARTED: 0
HOW OFTEN DURING THE LAST YEAR HAVE YOU FOUND THAT YOU WERE NOT ABLE TO STOP DRINKING ONCE YOU HAD STARTED: NEVER
HAS A RELATIVE, FRIEND, DOCTOR, OR ANOTHER HEALTH PROFESSIONAL EXPRESSED CONCERN ABOUT YOUR DRINKING OR SUGGESTED YOU CUT DOWN: NO
HOW OFTEN DURING THE LAST YEAR HAVE YOU BEEN UNABLE TO REMEMBER WHAT HAPPENED THE NIGHT BEFORE BECAUSE YOU HAD BEEN DRINKING: NEVER
HOW OFTEN DURING THE LAST YEAR HAVE YOU NEEDED AN ALCOHOLIC DRINK FIRST THING IN THE MORNING TO GET YOURSELF GOING AFTER A NIGHT OF HEAVY DRINKING: NEVER
HAS A RELATIVE, FRIEND, DOCTOR, OR ANOTHER HEALTH PROFESSIONAL EXPRESSED CONCERN ABOUT YOUR DRINKING OR SUGGESTED YOU CUT DOWN: 0
HAVE YOU OR SOMEONE ELSE BEEN INJURED AS A RESULT OF YOUR DRINKING: NO
HAVE YOU OR SOMEONE ELSE BEEN INJURED AS A RESULT OF YOUR DRINKING: 0
HOW OFTEN DO YOU HAVE A DRINK CONTAINING ALCOHOL: 3
HOW OFTEN DURING THE LAST YEAR HAVE YOU NEEDED AN ALCOHOLIC DRINK FIRST THING IN THE MORNING TO GET YOURSELF GOING AFTER A NIGHT OF HEAVY DRINKING: 0
HOW OFTEN DO YOU HAVE SIX OR MORE DRINKS ON ONE OCCASION: 2

## 2023-12-11 ASSESSMENT — PATIENT HEALTH QUESTIONNAIRE - PHQ9
2. FEELING DOWN, DEPRESSED OR HOPELESS: 0
1. LITTLE INTEREST OR PLEASURE IN DOING THINGS: 1
SUM OF ALL RESPONSES TO PHQ QUESTIONS 1-9: 1
SUM OF ALL RESPONSES TO PHQ QUESTIONS 1-9: 1
SUM OF ALL RESPONSES TO PHQ9 QUESTIONS 1 & 2: 1
SUM OF ALL RESPONSES TO PHQ QUESTIONS 1-9: 1
SUM OF ALL RESPONSES TO PHQ QUESTIONS 1-9: 1

## 2023-12-14 ENCOUNTER — OFFICE VISIT (OUTPATIENT)
Dept: FAMILY MEDICINE CLINIC | Age: 78
End: 2023-12-14
Payer: MEDICARE

## 2023-12-14 VITALS
HEIGHT: 67 IN | OXYGEN SATURATION: 90 % | DIASTOLIC BLOOD PRESSURE: 80 MMHG | TEMPERATURE: 97.3 F | HEART RATE: 99 BPM | WEIGHT: 172.8 LBS | SYSTOLIC BLOOD PRESSURE: 140 MMHG | BODY MASS INDEX: 27.12 KG/M2

## 2023-12-14 DIAGNOSIS — Z79.01 WARFARIN ANTICOAGULATION: ICD-10-CM

## 2023-12-14 DIAGNOSIS — Z86.718 HX OF FEMORAL ARTERY THROMBOSIS: ICD-10-CM

## 2023-12-14 DIAGNOSIS — Z00.00 MEDICARE ANNUAL WELLNESS VISIT, SUBSEQUENT: Primary | ICD-10-CM

## 2023-12-14 DIAGNOSIS — M15.9 PRIMARY OSTEOARTHRITIS INVOLVING MULTIPLE JOINTS: Chronic | ICD-10-CM

## 2023-12-14 LAB
INTERNATIONAL NORMALIZATION RATIO, POC: 2.8
PROTHROMBIN TIME, POC: 33.9

## 2023-12-14 PROCEDURE — G8417 CALC BMI ABV UP PARAM F/U: HCPCS | Performed by: FAMILY MEDICINE

## 2023-12-14 PROCEDURE — G8427 DOCREV CUR MEDS BY ELIG CLIN: HCPCS | Performed by: FAMILY MEDICINE

## 2023-12-14 PROCEDURE — 3077F SYST BP >= 140 MM HG: CPT | Performed by: FAMILY MEDICINE

## 2023-12-14 PROCEDURE — G0439 PPPS, SUBSEQ VISIT: HCPCS | Performed by: FAMILY MEDICINE

## 2023-12-14 PROCEDURE — 85610 PROTHROMBIN TIME: CPT | Performed by: FAMILY MEDICINE

## 2023-12-14 PROCEDURE — 1036F TOBACCO NON-USER: CPT | Performed by: FAMILY MEDICINE

## 2023-12-14 PROCEDURE — 3079F DIAST BP 80-89 MM HG: CPT | Performed by: FAMILY MEDICINE

## 2023-12-14 PROCEDURE — G8484 FLU IMMUNIZE NO ADMIN: HCPCS | Performed by: FAMILY MEDICINE

## 2023-12-14 PROCEDURE — 1123F ACP DISCUSS/DSCN MKR DOCD: CPT | Performed by: FAMILY MEDICINE

## 2023-12-14 PROCEDURE — 99213 OFFICE O/P EST LOW 20 MIN: CPT | Performed by: FAMILY MEDICINE

## 2023-12-14 RX ORDER — WARFARIN SODIUM 3 MG/1
TABLET ORAL
Qty: 90 TABLET | Refills: 0 | Status: SHIPPED | OUTPATIENT
Start: 2023-12-14

## 2023-12-14 RX ORDER — GABAPENTIN 300 MG/1
300 CAPSULE ORAL 3 TIMES DAILY PRN
Qty: 90 CAPSULE | Refills: 1 | Status: SHIPPED | OUTPATIENT
Start: 2023-12-14 | End: 2024-06-11

## 2023-12-14 RX ORDER — TRAMADOL HYDROCHLORIDE 50 MG/1
100 TABLET ORAL EVERY 8 HOURS PRN
Qty: 42 TABLET | Refills: 0 | Status: SHIPPED | OUTPATIENT
Start: 2023-12-14 | End: 2023-12-21

## 2023-12-14 NOTE — ASSESSMENT & PLAN NOTE
He has significant arthritis. At his age fall risk is a concern. I cautioned him that either tramadol or gabapentin could increase the fall risk and asked him to cautiously try 1 or the other although he could end up taking both simultaneously. He is knowledgeable enough to be able to figure out for himself which medication is helpful. NSAIDs could be problematic given his anticoagulation.   There may be no point in continuing celecoxib since he reports that if he stops taking it for few days that the pain remains the same

## 2023-12-14 NOTE — ASSESSMENT & PLAN NOTE
His INR remains quite stable. He reports that after many years of managing it he is able to do this. I think it is reasonable that he reduce testing to perhaps every 6 to 8 weeks. I will send a prescription for additional warfarin 3 mg tablets which are the ones that he uses more often. He is quite knowledgeable about managing his anticoagulation.

## 2023-12-27 ENCOUNTER — ANESTHESIA EVENT (OUTPATIENT)
Dept: ENDOSCOPY | Age: 78
DRG: 378 | End: 2023-12-27
Payer: MEDICARE

## 2023-12-27 ENCOUNTER — HOSPITAL ENCOUNTER (INPATIENT)
Age: 78
LOS: 1 days | Discharge: HOME | End: 2023-12-28
Attending: INTERNAL MEDICINE
Payer: MEDICARE

## 2023-12-27 DIAGNOSIS — K92.1 MELENA: ICD-10-CM

## 2023-12-27 DIAGNOSIS — M15.9 PRIMARY OSTEOARTHRITIS INVOLVING MULTIPLE JOINTS: Chronic | ICD-10-CM

## 2023-12-27 DIAGNOSIS — K92.2 UPPER GI BLEED: Primary | ICD-10-CM

## 2023-12-27 LAB
ABO/RH: NORMAL
ALBUMIN SERPL-MCNC: 3.7 GM/DL (ref 3.4–5)
ALBUMIN SERPL-MCNC: 3.9 GM/DL (ref 3.4–5)
ALP BLD-CCNC: 39 IU/L (ref 40–129)
ALP BLD-CCNC: 42 IU/L (ref 40–129)
ALT SERPL-CCNC: 12 U/L (ref 10–40)
ALT SERPL-CCNC: 12 U/L (ref 10–40)
ANION GAP SERPL CALCULATED.3IONS-SCNC: 12 MMOL/L (ref 7–16)
ANTIBODY SCREEN: NEGATIVE
APTT: 27.2 SECONDS (ref 25.1–37.1)
AST SERPL-CCNC: 17 IU/L (ref 15–37)
AST SERPL-CCNC: 19 IU/L (ref 15–37)
BASOPHILS ABSOLUTE: 0.1 K/CU MM
BASOPHILS RELATIVE PERCENT: 0.7 % (ref 0–1)
BILIRUB SERPL-MCNC: 0.2 MG/DL (ref 0–1)
BILIRUB SERPL-MCNC: 0.3 MG/DL (ref 0–1)
BILIRUBIN DIRECT: 0.2 MG/DL (ref 0–0.3)
BILIRUBIN URINE: NEGATIVE MG/DL
BILIRUBIN, INDIRECT: 0 MG/DL (ref 0–0.7)
BLOOD, URINE: NEGATIVE
BUN SERPL-MCNC: 30 MG/DL (ref 6–23)
CALCIUM SERPL-MCNC: 9.4 MG/DL (ref 8.3–10.6)
CHLORIDE BLD-SCNC: 104 MMOL/L (ref 99–110)
CLARITY: CLEAR
CO2: 26 MMOL/L (ref 21–32)
COLOR: YELLOW
COMMENT UA: NORMAL
CREAT SERPL-MCNC: 1.2 MG/DL (ref 0.9–1.3)
DIFFERENTIAL TYPE: ABNORMAL
EKG ATRIAL RATE: 69 BPM
EKG DIAGNOSIS: NORMAL
EKG P AXIS: 71 DEGREES
EKG P-R INTERVAL: 140 MS
EKG Q-T INTERVAL: 412 MS
EKG QRS DURATION: 88 MS
EKG QTC CALCULATION (BAZETT): 441 MS
EKG R AXIS: 67 DEGREES
EKG T AXIS: 54 DEGREES
EKG VENTRICULAR RATE: 69 BPM
EOSINOPHILS ABSOLUTE: 0.4 K/CU MM
EOSINOPHILS RELATIVE PERCENT: 3.8 % (ref 0–3)
FERRITIN: 81 NG/ML (ref 30–400)
FOLATE SERPL-MCNC: >20 NG/ML (ref 3.1–17.5)
GFR SERPL CREATININE-BSD FRML MDRD: >60 ML/MIN/1.73M2
GLUCOSE SERPL-MCNC: 122 MG/DL (ref 70–99)
GLUCOSE, URINE: NEGATIVE MG/DL
HCT VFR BLD CALC: 24 % (ref 42–52)
HCT VFR BLD CALC: 26.3 % (ref 42–52)
HEMOGLOBIN: 7.3 GM/DL (ref 13.5–18)
HEMOGLOBIN: 8.2 GM/DL (ref 13.5–18)
IMMATURE NEUTROPHIL %: 0.8 % (ref 0–0.43)
INR BLD: 1.7 INDEX
IRON: 66 UG/DL (ref 59–158)
KETONES, URINE: NEGATIVE MG/DL
LACTATE DEHYDROGENASE: 175 IU/L (ref 120–246)
LEUKOCYTE ESTERASE, URINE: NEGATIVE
LIPASE: 70 IU/L (ref 13–60)
LYMPHOCYTES ABSOLUTE: 1.7 K/CU MM
LYMPHOCYTES RELATIVE PERCENT: 16.5 % (ref 24–44)
MCH RBC QN AUTO: 32.3 PG (ref 27–31)
MCHC RBC AUTO-ENTMCNC: 31.2 % (ref 32–36)
MCV RBC AUTO: 103.5 FL (ref 78–100)
MONOCYTES ABSOLUTE: 0.7 K/CU MM
MONOCYTES RELATIVE PERCENT: 7.1 % (ref 0–4)
NITRITE URINE, QUANTITATIVE: NEGATIVE
NUCLEATED RBC %: 1.6 %
PCT TRANSFERRIN: 24 % (ref 10–44)
PDW BLD-RTO: 12.6 % (ref 11.7–14.9)
PH, URINE: 7 (ref 5–8)
PLATELET # BLD: 388 K/CU MM (ref 140–440)
PMV BLD AUTO: 9.4 FL (ref 7.5–11.1)
POTASSIUM SERPL-SCNC: 4.1 MMOL/L (ref 3.5–5.1)
PROTEIN UA: NEGATIVE MG/DL
PROTHROMBIN TIME: 20.5 SECONDS (ref 11.7–14.5)
RBC # BLD: 2.54 M/CU MM (ref 4.6–6.2)
RETICULOCYTE COUNT PCT: 5.6 % (ref 0.2–2.2)
SEGMENTED NEUTROPHILS ABSOLUTE COUNT: 7.3 K/CU MM
SEGMENTED NEUTROPHILS RELATIVE PERCENT: 71.1 % (ref 36–66)
SODIUM BLD-SCNC: 142 MMOL/L (ref 135–145)
SPECIFIC GRAVITY UA: 1.01 (ref 1–1.03)
TOTAL IMMATURE NEUTOROPHIL: 0.08 K/CU MM
TOTAL IRON BINDING CAPACITY: 280 UG/DL (ref 250–450)
TOTAL NUCLEATED RBC: 0.2 K/CU MM
TOTAL PROTEIN: 5.5 GM/DL (ref 6.4–8.2)
TOTAL PROTEIN: 6.6 GM/DL (ref 6.4–8.2)
TRANSFERRIN SERPL-MCNC: 247.1 MG/DL (ref 200–360)
TROPONIN, HIGH SENSITIVITY: 14 NG/L (ref 0–22)
UNSATURATED IRON BINDING CAPACITY: 214 UG/DL (ref 110–370)
UROBILINOGEN, URINE: 0.2 MG/DL (ref 0.2–1)
VITAMIN B-12: 485.6 PG/ML (ref 211–911)
WBC # BLD: 10.2 K/CU MM (ref 4–10.5)

## 2023-12-27 PROCEDURE — 85014 HEMATOCRIT: CPT

## 2023-12-27 PROCEDURE — 6360000002 HC RX W HCPCS: Performed by: INTERNAL MEDICINE

## 2023-12-27 PROCEDURE — 85045 AUTOMATED RETICULOCYTE COUNT: CPT

## 2023-12-27 PROCEDURE — 85018 HEMOGLOBIN: CPT

## 2023-12-27 PROCEDURE — 84466 ASSAY OF TRANSFERRIN: CPT

## 2023-12-27 PROCEDURE — 83010 ASSAY OF HAPTOGLOBIN QUANT: CPT

## 2023-12-27 PROCEDURE — 93010 ELECTROCARDIOGRAM REPORT: CPT | Performed by: INTERNAL MEDICINE

## 2023-12-27 PROCEDURE — A4216 STERILE WATER/SALINE, 10 ML: HCPCS | Performed by: INTERNAL MEDICINE

## 2023-12-27 PROCEDURE — 84484 ASSAY OF TROPONIN QUANT: CPT

## 2023-12-27 PROCEDURE — C9113 INJ PANTOPRAZOLE SODIUM, VIA: HCPCS | Performed by: PHYSICIAN ASSISTANT

## 2023-12-27 PROCEDURE — 1200000000 HC SEMI PRIVATE

## 2023-12-27 PROCEDURE — 36415 COLL VENOUS BLD VENIPUNCTURE: CPT

## 2023-12-27 PROCEDURE — 6360000002 HC RX W HCPCS: Performed by: PHYSICIAN ASSISTANT

## 2023-12-27 PROCEDURE — C9113 INJ PANTOPRAZOLE SODIUM, VIA: HCPCS | Performed by: INTERNAL MEDICINE

## 2023-12-27 PROCEDURE — 99285 EMERGENCY DEPT VISIT HI MDM: CPT

## 2023-12-27 PROCEDURE — 86901 BLOOD TYPING SEROLOGIC RH(D): CPT

## 2023-12-27 PROCEDURE — 80053 COMPREHEN METABOLIC PANEL: CPT

## 2023-12-27 PROCEDURE — 81003 URINALYSIS AUTO W/O SCOPE: CPT

## 2023-12-27 PROCEDURE — 83615 LACTATE (LD) (LDH) ENZYME: CPT

## 2023-12-27 PROCEDURE — 93005 ELECTROCARDIOGRAM TRACING: CPT | Performed by: INTERNAL MEDICINE

## 2023-12-27 PROCEDURE — 96374 THER/PROPH/DIAG INJ IV PUSH: CPT

## 2023-12-27 PROCEDURE — 99222 1ST HOSP IP/OBS MODERATE 55: CPT | Performed by: SPECIALIST

## 2023-12-27 PROCEDURE — 83690 ASSAY OF LIPASE: CPT

## 2023-12-27 PROCEDURE — 6370000000 HC RX 637 (ALT 250 FOR IP): Performed by: INTERNAL MEDICINE

## 2023-12-27 PROCEDURE — 85610 PROTHROMBIN TIME: CPT

## 2023-12-27 PROCEDURE — 80076 HEPATIC FUNCTION PANEL: CPT

## 2023-12-27 PROCEDURE — 83540 ASSAY OF IRON: CPT

## 2023-12-27 PROCEDURE — 82746 ASSAY OF FOLIC ACID SERUM: CPT

## 2023-12-27 PROCEDURE — 86900 BLOOD TYPING SEROLOGIC ABO: CPT

## 2023-12-27 PROCEDURE — 86850 RBC ANTIBODY SCREEN: CPT

## 2023-12-27 PROCEDURE — 85025 COMPLETE CBC W/AUTO DIFF WBC: CPT

## 2023-12-27 PROCEDURE — 94761 N-INVAS EAR/PLS OXIMETRY MLT: CPT

## 2023-12-27 PROCEDURE — 82728 ASSAY OF FERRITIN: CPT

## 2023-12-27 PROCEDURE — 2580000003 HC RX 258: Performed by: PHYSICIAN ASSISTANT

## 2023-12-27 PROCEDURE — 85730 THROMBOPLASTIN TIME PARTIAL: CPT

## 2023-12-27 PROCEDURE — 2580000003 HC RX 258: Performed by: INTERNAL MEDICINE

## 2023-12-27 PROCEDURE — 82607 VITAMIN B-12: CPT

## 2023-12-27 RX ORDER — ONDANSETRON 4 MG/1
4 TABLET, ORALLY DISINTEGRATING ORAL EVERY 8 HOURS PRN
Status: DISCONTINUED | OUTPATIENT
Start: 2023-12-27 | End: 2023-12-28 | Stop reason: HOSPADM

## 2023-12-27 RX ORDER — ACETAMINOPHEN 325 MG/1
650 TABLET ORAL EVERY 6 HOURS PRN
Status: DISCONTINUED | OUTPATIENT
Start: 2023-12-27 | End: 2023-12-28 | Stop reason: HOSPADM

## 2023-12-27 RX ORDER — PANTOPRAZOLE SODIUM 40 MG/10ML
40 INJECTION, POWDER, LYOPHILIZED, FOR SOLUTION INTRAVENOUS ONCE
Status: COMPLETED | OUTPATIENT
Start: 2023-12-27 | End: 2023-12-27

## 2023-12-27 RX ORDER — TRAMADOL HYDROCHLORIDE 50 MG/1
50 TABLET ORAL 2 TIMES DAILY
COMMUNITY
End: 2024-01-04 | Stop reason: SDUPTHER

## 2023-12-27 RX ORDER — GABAPENTIN 300 MG/1
300 CAPSULE ORAL 3 TIMES DAILY PRN
Status: DISCONTINUED | OUTPATIENT
Start: 2023-12-27 | End: 2023-12-28 | Stop reason: HOSPADM

## 2023-12-27 RX ORDER — SODIUM CHLORIDE 0.9 % (FLUSH) 0.9 %
5-40 SYRINGE (ML) INJECTION PRN
Status: DISCONTINUED | OUTPATIENT
Start: 2023-12-27 | End: 2023-12-28 | Stop reason: HOSPADM

## 2023-12-27 RX ORDER — FENOFIBRATE 54 MG/1
54 TABLET ORAL DAILY
Status: DISCONTINUED | OUTPATIENT
Start: 2023-12-27 | End: 2023-12-28 | Stop reason: HOSPADM

## 2023-12-27 RX ORDER — ONDANSETRON 2 MG/ML
4 INJECTION INTRAMUSCULAR; INTRAVENOUS EVERY 6 HOURS PRN
Status: DISCONTINUED | OUTPATIENT
Start: 2023-12-27 | End: 2023-12-28 | Stop reason: HOSPADM

## 2023-12-27 RX ORDER — METOPROLOL SUCCINATE 50 MG/1
100 TABLET, EXTENDED RELEASE ORAL DAILY
Status: DISCONTINUED | OUTPATIENT
Start: 2023-12-27 | End: 2023-12-28 | Stop reason: HOSPADM

## 2023-12-27 RX ORDER — HYDROCHLOROTHIAZIDE 25 MG/1
25 TABLET ORAL EVERY MORNING
Status: DISCONTINUED | OUTPATIENT
Start: 2023-12-28 | End: 2023-12-28 | Stop reason: HOSPADM

## 2023-12-27 RX ORDER — AMLODIPINE BESYLATE 5 MG/1
5 TABLET ORAL DAILY
Status: DISCONTINUED | OUTPATIENT
Start: 2023-12-27 | End: 2023-12-28 | Stop reason: HOSPADM

## 2023-12-27 RX ORDER — SODIUM CHLORIDE 9 MG/ML
INJECTION, SOLUTION INTRAVENOUS PRN
Status: DISCONTINUED | OUTPATIENT
Start: 2023-12-27 | End: 2023-12-28 | Stop reason: HOSPADM

## 2023-12-27 RX ORDER — ACETAMINOPHEN 650 MG/1
650 SUPPOSITORY RECTAL EVERY 6 HOURS PRN
Status: DISCONTINUED | OUTPATIENT
Start: 2023-12-27 | End: 2023-12-28 | Stop reason: HOSPADM

## 2023-12-27 RX ORDER — 0.9 % SODIUM CHLORIDE 0.9 %
1000 INTRAVENOUS SOLUTION INTRAVENOUS ONCE
Status: COMPLETED | OUTPATIENT
Start: 2023-12-27 | End: 2023-12-28

## 2023-12-27 RX ORDER — SODIUM CHLORIDE 0.9 % (FLUSH) 0.9 %
5-40 SYRINGE (ML) INJECTION EVERY 12 HOURS SCHEDULED
Status: DISCONTINUED | OUTPATIENT
Start: 2023-12-27 | End: 2023-12-28 | Stop reason: HOSPADM

## 2023-12-27 RX ADMIN — SODIUM CHLORIDE, PRESERVATIVE FREE 5 ML: 5 INJECTION INTRAVENOUS at 21:41

## 2023-12-27 RX ADMIN — PANTOPRAZOLE SODIUM 40 MG: 40 INJECTION, POWDER, FOR SOLUTION INTRAVENOUS at 19:31

## 2023-12-27 RX ADMIN — SODIUM CHLORIDE 1000 ML: 9 INJECTION, SOLUTION INTRAVENOUS at 11:34

## 2023-12-27 RX ADMIN — PANTOPRAZOLE SODIUM 40 MG: 40 INJECTION, POWDER, FOR SOLUTION INTRAVENOUS at 11:36

## 2023-12-27 ASSESSMENT — PAIN - FUNCTIONAL ASSESSMENT
PAIN_FUNCTIONAL_ASSESSMENT: NONE - DENIES PAIN
PAIN_FUNCTIONAL_ASSESSMENT: 0-10
PAIN_FUNCTIONAL_ASSESSMENT: ACTIVITIES ARE NOT PREVENTED

## 2023-12-27 ASSESSMENT — PAIN DESCRIPTION - PAIN TYPE: TYPE: CHRONIC PAIN

## 2023-12-27 ASSESSMENT — PAIN SCALES - GENERAL
PAINLEVEL_OUTOF10: 8
PAINLEVEL_OUTOF10: 0

## 2023-12-27 ASSESSMENT — PAIN DESCRIPTION - ORIENTATION: ORIENTATION: RIGHT;LEFT

## 2023-12-27 ASSESSMENT — PAIN DESCRIPTION - DESCRIPTORS: DESCRIPTORS: ACHING;THROBBING;SHARP

## 2023-12-27 ASSESSMENT — PAIN DESCRIPTION - LOCATION: LOCATION: BACK;HAND;SHOULDER

## 2023-12-27 NOTE — CONSULTS
[facility]             MD Cassy Dunbar MD Coreen July, APRN-CNP       Franko Gramajo, APRN-CNP             [address] [facility] [address] Critical access hospital, [address] [facility]             [phone] fax [phone]      Physician attestation:      Reason for Consult: Black tarry stools/upper GI bleed? HISTORY OF PRESENT ILLNESS:              The patient is a 66 y.o. male with a past medical history . Patient reports having melena stools x3 days. Has also been experiencing lightheadedness, fatigue, increased sleepiness. Has hx of duodenal ulcers. Last EGD was with Dr. Osvaldo Perez on 12/4/2019. 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 On admission his hemoglobin was found to be 8.2, BUN 30. Hgb 8.2. He is not on any anticoagulation per patient, however home meds include warfarin. It appears it is not currently ordered per epic chart review. Patient was examined at bedside with wife present. Patient endorses use of Celebrex, and tramadol for pain. He was not taking a PPI for stomach protection. Of note he had a similar episode where he was taking Aleve and developed duodenal ulcers, possible Zimmer's and a hiatal hernia in 2017. He denies any dysphagia, heartburn/reflux, nausea, vomiting, constipation, diarrhea, hematochezia. Positive for epigastric pain, melena. ROS: Per history of present illness. All other systems were reviewed and were negative. Allergies: Allergies   Allergen Reactions    Azithromycin Diarrhea and Nausea And Vomiting     Diarrhea and coffee ground vomiting.     .    Medications:   Current Facility-Administered Medications   Medication Dose Route Frequency Provider Last

## 2023-12-27 NOTE — ED NOTES
Medication History  [facility]    Patient Name: Mohan Denny 1945     Medication history has been completed by: Demetris Jarrell CPhT    Source(s) of information: patient and insurance claims     Primary Care Physician: Sejal Garza MD     Pharmacy: [facility]    Allergies as of 12/27/2023 - Fully Reviewed 12/27/2023   Allergen Reaction Noted    Azithromycin Diarrhea and Nausea And Vomiting 10/25/2016        Prior to Admission medications    Medication Sig Start Date End Date Taking? Authorizing Provider   traMADol (ULTRAM) 50 MG tablet Take 1 tablet by mouth 2 times daily. Max Daily Amount: 100 mg   Yes Provider, MD Haider   fenofibrate (TRICOR) 54 MG tablet TAKE 1 TABLET BY MOUTH IN THE MORNING 12/19/23   Sejal Garza MD   gabapentin (NEURONTIN) 300 MG capsule Take 1 capsule by mouth 2 times daily. 12/27/23 Can take up to three times daily, patient reports only taking twice daily 12/14/23 6/11/24  Sejal Garza MD   warfarin (COUMADIN) 3 MG tablet TAKE 1 TABLET BY MOUTH 6 DAYS A WEEK AND THEN TAKE 4MG BY MOUTH THE OTHER DAY AS DIRECTED 12/14/23   Sejal Garza MD   warfarin (COUMADIN) 4 MG tablet Take 4 mg 2 days a week (and take 3mg the other 5 days).  12/2/23   Sejal Garza MD   celecoxib (CELEBREX) 200 MG capsule Take 1 capsule by mouth once daily 11/20/23   Sejal Garza MD   amLODIPine [facility]) 5 MG tablet Take 1 tablet by mouth once daily 10/10/23   Sejal Garza MD   pravastatin (PRAVACHOL) 40 MG tablet  Take 1 tablet by mouth Daily with lunch 10/9/23   Sejal Garza MD   metoprolol succinate (TOPROL XL) 100 MG extended release tablet Take 1 tablet by mouth once daily 10/9/23   Sejal Garza MD   hydroCHLOROthiazide (HYDRODIURIL) 25 MG tablet TAKE 1 TABLET BY MOUTH ONCE DAILY IN THE MORNING 10/9/23   Sejal Garza MD   nitroGLYCERIN (NITROSTAT) 0.4 MG SL tablet Place 1 tablet under the tongue every 5 minutes as needed for Chest

## 2023-12-27 NOTE — H&P
V2.0  History and Physical      Name:  Mannie Crump /Age/Sex: 1945  (66 y.o. male)   MRN & CSN:  [id number] & [id number] Encounter Date/Time: 2023 1:32 PM EST   Location:  ED05/ED-05 PCP: Alanis Yang MD       Hospital Day: 1    Assessment and Plan:   Mannie Crump is a 66 y.o. male with a pmh of A-fib, hypertension, hyperlipidemia who presents with GI bleed    Hospital Problems             Last Modified POA    * (Principal) GI bleed (Chronic) 2023 Yes       GI bleed  Black tarry/melanotic stool  Macrocytic anemia, hemoglobin 8.2  Patient reports black tarry stool for the past 3 days. No associated abdominal pain/GERD. Past history of duodenal ulcer. Most recent hemoglobin from a year back only and is at 14. Reports lightheadedness, fatigue, drowsiness.  -Concern for upper GI bleed  -GI team on board, plan for EGD tomorrow morning  -Clears for now, N.p.o. after midnight  -Pantoprazole 40 mg IV twice daily  -H&H q6hrs for now; if hemoglobin drops further or patient decompensates-plan for emergent EGD today  -Anemia panel pending  -May benefit from IV iron if indicated    History of A-fib on warfarin, metoprolol succinate 100 Mg once daily  -Hold warfarin  -Patient has not been taking warfarin for the past 2 days  -Resume warfarin when able  -Resume metoprolol    Hypertension, amlodipine 5 Mg daily, hydrochlorothiazide 25 Mg once daily  -Resume    Hyperlipidemia, fenofibrate 54 Mg daily, pravastatin 40 Mg daily  -Resume    Disposition:     Diet Diet NPO Exceptions are: Ice Chips, Sips of Water with Meds  ADULT DIET;  Clear Liquid   DVT Prophylaxis [] Lovenox, []  Heparin, [] SCDs, [] Ambulation,  [] Eliquis, [] Xarelto  [] Coumadin   Peptic ulcer prophylaxis Pantoprazole   Code Status Prior   Disposition From / Current living situation : home  Expected Disposition: home  Estimated Date of Discharge: 3-4 days  Patient requires continued admission due to pending workup and medical stability   Infusions:   PRN Meds:     Labs      CBC:   Recent Labs     12/27/23  1022   WBC 10.2   HGB 8.2*        BMP:    Recent Labs     12/27/23  1022      K 4.1      CO2 26   BUN 30*   CREATININE 1.2   GLUCOSE 122*     Hepatic:   Recent Labs     12/27/23  1022   AST 17   ALT 12   BILITOT 0.3   ALKPHOS 42     Lipids:   Lab Results   Component Value Date/Time    CHOL 187 10/14/2022 09:40 AM    HDL 56 10/14/2022 09:40 AM    TRIG 158 10/14/2022 09:40 AM     Hemoglobin A1C: No results found for: \"LABA1C\"  TSH:   Lab Results   Component Value Date/Time    TSH 1.250 08/26/2019 02:41 PM     Troponin:   Lab Results   Component Value Date/Time    TROPONINT <0.010 10/13/2017 11:25 AM     Lactic Acid: No results for input(s): \"LACTA\" in the last 72 hours.  BNP: No results for input(s): \"PROBNP\" in the last 72 hours.  UA:  Lab Results   Component Value Date/Time    NITRU NEGATIVE 12/27/2023 11:57 AM    COLORU YELLOW 12/27/2023 11:57 AM    WBCUA 1 10/29/2016 06:45 AM    RBCUA <1 10/29/2016 06:45 AM    MUCUS RARE 10/25/2016 05:15 PM    BACTERIA RARE 10/29/2016 06:45 AM    CLARITYU CLEAR 12/27/2023 11:57 AM    SPECGRAV 1.015 12/27/2023 11:57 AM    LEUKOCYTESUR NEGATIVE 12/27/2023 11:57 AM    UROBILINOGEN 0.2 12/27/2023 11:57 AM    BILIRUBINUR NEGATIVE 12/27/2023 11:57 AM    BLOODU NEGATIVE 12/27/2023 11:57 AM    KETUA NEGATIVE 12/27/2023 11:57 AM     Urine Cultures: No results found for: \"LABURIN\"  Blood Cultures: No results found for: \"BC\"  No results found for: \"BLOODCULT2\"  Organism: No results found for: \"ORG\"    Imaging/Diagnostics Last 24 Hours   No results found.        Electronically signed by Mikayla Miller MD on 12/27/2023 at 1:32 PM      Comment: Please note this report has been produced using speech recognition software and may contain errors related to that system including errors in grammar, punctuation, and spelling, as well as words and phrases that may be inappropriate. If there are any questions or

## 2023-12-27 NOTE — ED NOTES
EDG @ 2590 Pt spouse called, CT taken on 12/8/22  Please call with results  Pt spouse hoping to hear back today, has called several times   Donel Hearing can be reached at [phone]  Tasked to nursing

## 2023-12-27 NOTE — CARE COORDINATION
[facility] criteria for GI bleed reviewed at this time, criteria supports Inpatient Admission.  SONIA,RN/CM

## 2023-12-27 NOTE — ED PROVIDER NOTES
1020 12/27/23 1020 12/27/23 1020   (!) 120/54 97.6 °F (36.4 °C) Oral 80 17 98 % 1.676 m (5' 6\") 78 kg (172 lb)      Constitutional:  Well developed, Well nourished.  No distress  HENT:  Normocephalic, Atraumatic, PERRL.  EOMI.  Sclera clear.Conjunctiva normal, No discharge.   Oropharynx is within normal limits, no tonsillar hypertrophy white exudate, tolerating secretions.  Bilateral TMs are pearly white without signs of significant erythema or effusion.  No perforation. No mastoid tenderness.  Neck/Lymphatics:  supple, no JVD, no swollen nodes  Cardiovascular:   RRR,  no murmurs/rubs/gallops.  Respiratory:   Nonlabored breathing.  Normal breath sounds, No wheezing  Abdomen:  Bowel sounds normal, Soft, No tenderness, no masses.Rectal exam: negative without mass, lesions or tenderness, stool guaiac positive.   :  No significant flank tenderness to percussion.  Musculoskeletal:      There is no edema, asymmetry, or calf / thigh tenderness bilaterally.   No cyanosis.    No cool or pale-appearing limb.  Distal cap refill and pulses intact bilateral upper and lower extremities  Bilateral upper and lower extremity ROM intact without pain or obvious deficit  Integument:   Warm, Dry  Neurologic:  Alert & oriented , No focal deficits noted.   Cranial nerves II through XII grossly intact.   Normal gross motor coordination & motor strength bilateral upper and lower extremities  Sensation intact.  Psychiatric:  Affect normal, Mood normal.     DIAGNOSTIC RESULTS   LABS:    Labs Reviewed   CBC WITH AUTO DIFFERENTIAL - Abnormal; Notable for the following components:       Result Value    RBC 2.54 (*)     Hemoglobin 8.2 (*)     Hematocrit 26.3 (*)     .5 (*)     MCH 32.3 (*)     MCHC 31.2 (*)     Segs Relative 71.1 (*)     Lymphocytes % 16.5 (*)     Monocytes % 7.1 (*)     Eosinophils % 3.8 (*)     Immature Neutrophil % 0.8 (*)     All other components within normal limits   COMPREHENSIVE METABOLIC PANEL - Abnormal;  Notable for the following components:    Glucose 122 (*)     BUN 30 (*)     All other components within normal limits   LIPASE - Abnormal; Notable for the following components:    Lipase 70 (*)     All other components within normal limits   PROTIME/INR & PTT - Abnormal; Notable for the following components:    Protime 20.5 (*)     All other components within normal limits   TROPONIN   URINALYSIS   TYPE AND SCREEN       When ordered, only abnormal lab results are displayed. All other labs were within normal range or not returned as of this dictation. EKG: When ordered, EKG's are interpreted by the Emergency Department Physician in the absence of a cardiologist.  Please see their note for interpretation of EKG. RADIOLOGY:   Non-plain film images such as CT, Ultrasound and MRI are read by the radiologist. Plain radiographic images are visualized and preliminarily interpreted by the  ED Provider with the below findings:    Interpretation perthe Radiologist below, if available at the time of this note:    No orders to display     No results found. PROCEDURES   Unless otherwise noted below, none       CRITICAL CARE   CRITICAL CARE NOTE:  CRITICAL CARE NOTE:  There was a high probability of clinically significant life-threatening deterioration of the patient's condition requiring my urgent intervention due to upper GI bleed. IV fluids, IV Protonix, specialty consultation, patient counseling, family counseling. Was performed to address this. Total critical care time is  45 minutes. This includes vital sign monitoring, pulse oximetry monitoring, telemetry monitoring, clinical response to the IV medications, reviewing the nursing notes, consultation time, dictation/documentation time, and interpretation of the lab work. This time excludes time spent performing procedures and separately billable procedures and family discussion time.   N/A    CONSULTS:  IP CONSULT TO GI      EMERGENCY DEPARTMENT COURSE and

## 2023-12-28 ENCOUNTER — ANESTHESIA (OUTPATIENT)
Dept: ENDOSCOPY | Age: 78
DRG: 378 | End: 2023-12-28
Payer: MEDICARE

## 2023-12-28 VITALS
DIASTOLIC BLOOD PRESSURE: 53 MMHG | SYSTOLIC BLOOD PRESSURE: 153 MMHG | HEART RATE: 81 BPM | WEIGHT: 172 LBS | TEMPERATURE: 98.1 F | HEIGHT: 66 IN | BODY MASS INDEX: 27.64 KG/M2 | RESPIRATION RATE: 15 BRPM | OXYGEN SATURATION: 92 %

## 2023-12-28 LAB
ALBUMIN SERPL-MCNC: 3.6 GM/DL (ref 3.4–5)
ALP BLD-CCNC: 37 IU/L (ref 40–129)
ALT SERPL-CCNC: 11 U/L (ref 10–40)
ANION GAP SERPL CALCULATED.3IONS-SCNC: 11 MMOL/L (ref 7–16)
ANISOCYTOSIS: ABNORMAL
APTT: 27.6 SECONDS (ref 25.1–37.1)
AST SERPL-CCNC: 19 IU/L (ref 15–37)
BASOPHILS ABSOLUTE: 0.1 K/CU MM
BASOPHILS RELATIVE PERCENT: 1 % (ref 0–1)
BILIRUB SERPL-MCNC: 0.3 MG/DL (ref 0–1)
BILIRUBIN DIRECT: 0.2 MG/DL (ref 0–0.3)
BILIRUBIN, INDIRECT: 0.1 MG/DL (ref 0–0.7)
BUN SERPL-MCNC: 19 MG/DL (ref 6–23)
CALCIUM SERPL-MCNC: 8.6 MG/DL (ref 8.3–10.6)
CHLORIDE BLD-SCNC: 104 MMOL/L (ref 99–110)
CO2: 24 MMOL/L (ref 21–32)
CREAT SERPL-MCNC: 1.1 MG/DL (ref 0.9–1.3)
DIFFERENTIAL TYPE: ABNORMAL
EOSINOPHILS ABSOLUTE: 0.7 K/CU MM
EOSINOPHILS RELATIVE PERCENT: 7 % (ref 0–3)
GFR SERPL CREATININE-BSD FRML MDRD: >60 ML/MIN/1.73M2
GLUCOSE SERPL-MCNC: 94 MG/DL (ref 70–99)
HCT VFR BLD CALC: 23.1 % (ref 42–52)
HCT VFR BLD CALC: 23.1 % (ref 42–52)
HEMOCCULT SP1 STL QL: POSITIVE
HEMOGLOBIN: 7 GM/DL (ref 13.5–18)
HEMOGLOBIN: 7.1 GM/DL (ref 13.5–18)
HYPOCHROMIA: ABNORMAL
INR BLD: 1.7 INDEX
IRON: 71 UG/DL (ref 59–158)
LYMPHOCYTES ABSOLUTE: 2.2 K/CU MM
LYMPHOCYTES RELATIVE PERCENT: 22 % (ref 24–44)
MACROCYTES: ABNORMAL
MAGNESIUM: 2.1 MG/DL (ref 1.8–2.4)
MCH RBC QN AUTO: 32 PG (ref 27–31)
MCHC RBC AUTO-ENTMCNC: 30.3 % (ref 32–36)
MCV RBC AUTO: 105.5 FL (ref 78–100)
MONOCYTES ABSOLUTE: 0.5 K/CU MM
MONOCYTES RELATIVE PERCENT: 5 % (ref 0–4)
NUCLEATED RED BLOOD CELLS: 2
PCT TRANSFERRIN: 26 % (ref 10–44)
PDW BLD-RTO: 12.9 % (ref 11.7–14.9)
PHOSPHORUS: 3.9 MG/DL (ref 2.5–4.9)
PLATELET # BLD: 340 K/CU MM (ref 140–440)
PMV BLD AUTO: 9.6 FL (ref 7.5–11.1)
POLYCHROMASIA: ABNORMAL
POTASSIUM SERPL-SCNC: 4.6 MMOL/L (ref 3.5–5.1)
PROTHROMBIN TIME: 19.7 SECONDS (ref 11.7–14.5)
RBC # BLD: 2.19 M/CU MM (ref 4.6–6.2)
RBC # BLD: ABNORMAL 10*6/UL
SEGMENTED NEUTROPHILS ABSOLUTE COUNT: 6.3 K/CU MM
SEGMENTED NEUTROPHILS RELATIVE PERCENT: 65 % (ref 36–66)
SMEAR REVIEW: NORMAL
SODIUM BLD-SCNC: 139 MMOL/L (ref 135–145)
TOTAL IRON BINDING CAPACITY: 277 UG/DL (ref 250–450)
TOTAL PROTEIN: 5.1 GM/DL (ref 6.4–8.2)
UNSATURATED IRON BINDING CAPACITY: 206 UG/DL (ref 110–370)
WBC # BLD: 9.8 K/CU MM (ref 4–10.5)

## 2023-12-28 PROCEDURE — 2580000003 HC RX 258: Performed by: INTERNAL MEDICINE

## 2023-12-28 PROCEDURE — C9113 INJ PANTOPRAZOLE SODIUM, VIA: HCPCS | Performed by: INTERNAL MEDICINE

## 2023-12-28 PROCEDURE — 83540 ASSAY OF IRON: CPT

## 2023-12-28 PROCEDURE — 82270 OCCULT BLOOD FECES: CPT

## 2023-12-28 PROCEDURE — 83550 IRON BINDING TEST: CPT

## 2023-12-28 PROCEDURE — 6360000002 HC RX W HCPCS: Performed by: INTERNAL MEDICINE

## 2023-12-28 PROCEDURE — 85018 HEMOGLOBIN: CPT

## 2023-12-28 PROCEDURE — 84100 ASSAY OF PHOSPHORUS: CPT

## 2023-12-28 PROCEDURE — 94761 N-INVAS EAR/PLS OXIMETRY MLT: CPT

## 2023-12-28 PROCEDURE — 6360000002 HC RX W HCPCS: Performed by: NURSE ANESTHETIST, CERTIFIED REGISTERED

## 2023-12-28 PROCEDURE — 6370000000 HC RX 637 (ALT 250 FOR IP): Performed by: FAMILY MEDICINE

## 2023-12-28 PROCEDURE — 43239 EGD BIOPSY SINGLE/MULTIPLE: CPT | Performed by: SPECIALIST

## 2023-12-28 PROCEDURE — 2580000003 HC RX 258: Performed by: SPECIALIST

## 2023-12-28 PROCEDURE — A4216 STERILE WATER/SALINE, 10 ML: HCPCS | Performed by: INTERNAL MEDICINE

## 2023-12-28 PROCEDURE — 85610 PROTHROMBIN TIME: CPT

## 2023-12-28 PROCEDURE — 3609012400 HC EGD TRANSORAL BIOPSY SINGLE/MULTIPLE: Performed by: SPECIALIST

## 2023-12-28 PROCEDURE — 85007 BL SMEAR W/DIFF WBC COUNT: CPT

## 2023-12-28 PROCEDURE — 2709999900 HC NON-CHARGEABLE SUPPLY: Performed by: SPECIALIST

## 2023-12-28 PROCEDURE — 0DB38ZX EXCISION OF LOWER ESOPHAGUS, VIA NATURAL OR ARTIFICIAL OPENING ENDOSCOPIC, DIAGNOSTIC: ICD-10-PCS | Performed by: SPECIALIST

## 2023-12-28 PROCEDURE — 85014 HEMATOCRIT: CPT

## 2023-12-28 PROCEDURE — 82248 BILIRUBIN DIRECT: CPT

## 2023-12-28 PROCEDURE — 0DB78ZX EXCISION OF STOMACH, PYLORUS, VIA NATURAL OR ARTIFICIAL OPENING ENDOSCOPIC, DIAGNOSTIC: ICD-10-PCS | Performed by: SPECIALIST

## 2023-12-28 PROCEDURE — 36415 COLL VENOUS BLD VENIPUNCTURE: CPT

## 2023-12-28 PROCEDURE — 87077 CULTURE AEROBIC IDENTIFY: CPT

## 2023-12-28 PROCEDURE — 3700000000 HC ANESTHESIA ATTENDED CARE: Performed by: SPECIALIST

## 2023-12-28 PROCEDURE — 85730 THROMBOPLASTIN TIME PARTIAL: CPT

## 2023-12-28 PROCEDURE — 80053 COMPREHEN METABOLIC PANEL: CPT

## 2023-12-28 PROCEDURE — 3700000001 HC ADD 15 MINUTES (ANESTHESIA): Performed by: SPECIALIST

## 2023-12-28 PROCEDURE — 83735 ASSAY OF MAGNESIUM: CPT

## 2023-12-28 PROCEDURE — 2500000003 HC RX 250 WO HCPCS: Performed by: NURSE ANESTHETIST, CERTIFIED REGISTERED

## 2023-12-28 PROCEDURE — 85027 COMPLETE CBC AUTOMATED: CPT

## 2023-12-28 RX ORDER — FERROUS SULFATE 325(65) MG
325 TABLET ORAL EVERY OTHER DAY
Qty: 15 TABLET | Refills: 1 | Status: SHIPPED | OUTPATIENT
Start: 2023-12-28

## 2023-12-28 RX ORDER — ASPIRIN 81 MG/1
81 TABLET ORAL DAILY
Qty: 90 TABLET | Refills: 0 | Status: SHIPPED | OUTPATIENT
Start: 2023-12-28

## 2023-12-28 RX ORDER — PANTOPRAZOLE SODIUM 40 MG/1
40 TABLET, DELAYED RELEASE ORAL
Status: DISCONTINUED | OUTPATIENT
Start: 2023-12-29 | End: 2023-12-28 | Stop reason: HOSPADM

## 2023-12-28 RX ORDER — LIDOCAINE HYDROCHLORIDE 20 MG/ML
INJECTION, SOLUTION EPIDURAL; INFILTRATION; INTRACAUDAL; PERINEURAL PRN
Status: DISCONTINUED | OUTPATIENT
Start: 2023-12-28 | End: 2023-12-28 | Stop reason: SDUPTHER

## 2023-12-28 RX ORDER — PANTOPRAZOLE SODIUM 40 MG/1
40 TABLET, DELAYED RELEASE ORAL
Qty: 30 TABLET | Refills: 1 | Status: SHIPPED | OUTPATIENT
Start: 2023-12-29 | End: 2024-01-04 | Stop reason: SDUPTHER

## 2023-12-28 RX ORDER — PROPOFOL 10 MG/ML
INJECTION, EMULSION INTRAVENOUS PRN
Status: DISCONTINUED | OUTPATIENT
Start: 2023-12-28 | End: 2023-12-28 | Stop reason: SDUPTHER

## 2023-12-28 RX ORDER — FLUTICASONE PROPIONATE 50 MCG
1 SPRAY, SUSPENSION (ML) NASAL DAILY
Status: DISCONTINUED | OUTPATIENT
Start: 2023-12-28 | End: 2023-12-28 | Stop reason: HOSPADM

## 2023-12-28 RX ADMIN — PROPOFOL 50 MG: 10 INJECTION, EMULSION INTRAVENOUS at 07:25

## 2023-12-28 RX ADMIN — IRON SUCROSE 200 MG: 20 INJECTION, SOLUTION INTRAVENOUS at 11:02

## 2023-12-28 RX ADMIN — PROPOFOL 50 MG: 10 INJECTION, EMULSION INTRAVENOUS at 07:28

## 2023-12-28 RX ADMIN — PROPOFOL 50 MG: 10 INJECTION, EMULSION INTRAVENOUS at 07:33

## 2023-12-28 RX ADMIN — FLUTICASONE PROPIONATE 1 SPRAY: 50 SPRAY, METERED NASAL at 02:18

## 2023-12-28 RX ADMIN — PANTOPRAZOLE SODIUM 40 MG: 40 INJECTION, POWDER, FOR SOLUTION INTRAVENOUS at 05:58

## 2023-12-28 RX ADMIN — PHENYLEPHRINE HYDROCHLORIDE 50 MCG: 10 INJECTION INTRAVENOUS at 07:28

## 2023-12-28 RX ADMIN — PROPOFOL 150 MG: 10 INJECTION, EMULSION INTRAVENOUS at 07:21

## 2023-12-28 RX ADMIN — SODIUM CHLORIDE, PRESERVATIVE FREE 10 ML: 5 INJECTION INTRAVENOUS at 08:23

## 2023-12-28 RX ADMIN — LIDOCAINE HYDROCHLORIDE 50 MG: 20 INJECTION, SOLUTION EPIDURAL; INFILTRATION; INTRACAUDAL; PERINEURAL at 07:21

## 2023-12-28 ASSESSMENT — PAIN SCALES - GENERAL
PAINLEVEL_OUTOF10: 0
PAINLEVEL_OUTOF10: 0

## 2023-12-28 NOTE — BRIEF OP NOTE
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 Biopsied.         -  Normal stomach.          - Biopsies were taken of the gastric body and antrum to assay for H pylori by            the Rimma-test method         - biopsies taken of the pre-pyloric antrum, angularis, and body of the stomach            for H pylori assay by histology         - 6 mm, clean-based ulcer in the distal duodenal bulb at 9:00 position  Recommendation:         - switch to oral PPI once daily and continue indefinitely         - hold NSAID's if possible         - regular diet         - OK for discharge from GI standpoint         - follow up in our office in 2-3 months

## 2023-12-28 NOTE — DISCHARGE SUMMARY
V2.0  Discharge Summary    Name:  Taya Collins /Age/Sex: 1945 (51 y.o. male)   Admit Date: 2023  Discharge Date: 23    MRN & CSN:  [id number] & [id number] Encounter Date and Time 23 6:35 PM EST    Attending:  Bennett Schwarz MD Discharging Provider: Bennett Schwarz MD       Hospital Course:     Brief HPI: Taya Collins is a 66 y.o. male who presented with ***    Brief Problem Based Course:   ***      The patient expressed appropriate understanding of, and agreement with the discharge recommendations, medications, and plan.      Consults this admission:  IP CONSULT TO GI    Discharge Diagnosis:   GI bleed    ***    Discharge Instruction:   Follow up appointments: ***  Primary care physician: Phil Mandujano MD within 2 weeks  Diet: {diet:78689}   Activity: {discharge activity:39833}  Disposition: Discharged to:   []Home, []C, []SNF, []Acute Rehab, []Hospice ***  Condition on discharge: Stable  Labs and Tests to be Followed up as an outpatient by PCP or Specialist: ***    Discharge Medications:        Medication List        START taking these medications      aspirin 81 MG EC tablet  Take 1 tablet by mouth daily     ferrous sulfate 325 (65 Fe) MG tablet  Commonly known as: IRON 325  Take 1 tablet by mouth every other day     pantoprazole 40 MG tablet  Commonly known as: PROTONIX  Take 1 tablet by mouth every morning (before breakfast)  Start taking on: 2023            CHANGE how you take these medications      pravastatin 40 MG tablet  Commonly known as: PRAVACHOL  Take 1 tablet by mouth once daily  What changed: when to take this            CONTINUE taking these medications      amLODIPine 5 MG tablet  Commonly known as: NORVASC  Take 1 tablet by mouth once daily     ascorbic acid 500 MG tablet  Commonly known as: VITAMIN C     CENTRUM SILVER PO     fenofibrate 54 MG tablet  Commonly known as: TRICOR  TAKE 1 TABLET BY MOUTH IN THE MORNING     gabapentin 300 MG

## 2023-12-28 NOTE — ANESTHESIA POSTPROCEDURE EVALUATION
Department of Anesthesiology  Postprocedure Note    Patient: Shanelle Ruiz  MRN: [id number]  YOB: 1945  Date of evaluation: 12/28/2023    Procedure Summary       Date: 12/28/23 Room / Location: [address] [place] [address]    Anesthesia Start: Filemon Ramus Anesthesia Stop: 3476    Procedure: EGD BIOPSY Diagnosis:       Upper GI bleed      (Upper GI bleed [K92.2])    Surgeons: Michelle Orellana MD Responsible Provider: Sharon Schulte MD    Anesthesia Type: general ASA Status: 4            Anesthesia Type: No value filed. Kami Phase I: Kami Score: 10    Kami Phase II:      Anesthesia Post Evaluation    Patient location during evaluation: bedside  Patient participation: complete - patient participated  Level of consciousness: awake  Pain score: 0  Airway patency: patent  Nausea & Vomiting: no vomiting and no nausea  Cardiovascular status: blood pressure returned to baseline and hemodynamically stable  Respiratory status: acceptable and room air  Hydration status: euvolemic  Pain management: adequate    No notable events documented.

## 2023-12-28 NOTE — CARE COORDINATION
12/28/23 1806   Service Assessment   Patient Orientation Alert and Oriented   Cognition Alert   History Provided By Patient;Spouse;Medical Record   Primary Caregiver Self   Support Systems Spouse/Significant Other   PCP Verified by CM Yes   Last Visit to PCP Within last 3 months   Prior Functional Level Independent in ADLs/IADLs   Current Functional Level Independent in ADLs/IADLs   Can patient return to prior living arrangement Yes   Ability to make needs known: Good   Family able to assist with home care needs: Yes   Would you like for me to discuss the discharge plan with any other family members/significant others, and if so, who? Yes   Financial Resources Medicare   Social/Functional History   Lives With Spouse   Type of [address] [place] [address]  One level   Home Equipment None   Active  Yes  (wife able to assist with transportation)   Mode of Transportation Car   Condition of Participation: Discharge Planning   The Patient and/Or Patient Representative agree with the Discharge Plan? Yes     Reviewed chart, discussed in IDR and spoke with pt/wife (with pt's permission) about discharge needs/plans. Pt plans on returning home with wife, denied need for any DME or HC. CM available as needed.

## 2023-12-29 LAB
CLOTEST: NEGATIVE
HAPTOGLOB SERPL-MCNC: 136 MG/DL (ref 30–200)

## 2024-01-02 ENCOUNTER — TELEPHONE (OUTPATIENT)
Dept: FAMILY MEDICINE CLINIC | Age: 79
End: 2024-01-02

## 2024-01-02 PROBLEM — K22.719 BARRETT'S ESOPHAGUS WITH DYSPLASIA: Status: ACTIVE | Noted: 2022-10-13

## 2024-01-02 NOTE — TELEPHONE ENCOUNTER
Care Transitions Initial Follow Up Call    Outreach made within 2 business days of discharge: Yes    Patient: Blade Sharma Patient : 1945   MRN: [id number]  Reason for Admission: There are no discharge diagnoses documented for the most recent discharge.  Discharge Date: 23       Spoke with: Patient    Discharge department/facility: [facility]    TCM Interactive Patient Contact:  Was patient able to fill all prescriptions: Yes  Was patient instructed to bring all medications to the follow-up visit: Yes  Is patient taking all medications as directed in the discharge summary? Yes  Does patient understand their discharge instructions: Yes  Does patient have questions or concerns that need addressed prior to 7-14 day follow up office visit: no    Scheduled appointment with PCP within 7-14 days    Follow Up  Future Appointments   Date Time Provider Department Center   2024  9:15 AM Dariusz Munoz MD [facility]   10/21/2024 10:00 AM Tonio Ram, APRN - CNP [facility]       Kary Ayala MA

## 2024-01-03 NOTE — PROGRESS NOTES
Physician Progress Note      PATIENT:               CARLENE VARGAS  [facility] #:                  [id number]  :                       1945  ADMIT DATE:       2023 10:27 AM  DISCH DATE:        2023 7:26 PM  RESPONDING  PROVIDER #:        Trace RAMON MD          QUERY TEXT:    Internal Medicine,    Patient admitted with melena due to suspected UGI bleed. Duodenal ulcer was   found on EGD and pathology results were negative.  If possible, please   document in progress notes and discharge summary the suspected cause of the GI   bleeding:    The medical record reflects the following:  Risk Factors: NSAID use, PMH duodenal ulcer  Clinical Indicators: Coumadin use, GI documents-UGI bleed-likely NSAID   gastropathy;; EGD showed Esophageal mucosal changes classified as Zimmer's   C1-M4 per [place], 6 mm, clean-based ulcer in the distal duodenal bulb  Treatment: labs, GI consult w/ EGD, PPI, supportive care    Thank you,  Cristina Martinez RN CDS  Options provided:  -- GI bleeding due to duodenal ulcer  -- GI bleeding due to Vijay's esophagus  -- GI bleeding due to ##please document suspected cause, please document   suspected cause.  -- Other - I will add my own diagnosis  -- Disagree - Not applicable / Not valid  -- Disagree - Clinically unable to determine / Unknown  -- Refer to Clinical Documentation Reviewer    PROVIDER RESPONSE TEXT:    This patient has GI bleeding due to duodenal ulcer.    Query created by: Cristina Martinez on 2024 12:18 PM      QUERY TEXT:    Internal Medicine,    Pt admitted with melena due to suspected UGI bleeding and has macrocytic   anemia documented. If possible, please document in progress notes and   discharge summary if the following can be further specified as  any of the   following:    The medical record reflects the following:  Risk Factors: UGI bleeding, coumadin use  Clinical Indicators: melena with hemoglobin drop to 7.1  Treatment: labs, EGD, high dose PPI, supportive 
4 Eyes Skin Assessment     NAME:  Gilda Keita  YOB: 1945  MEDICAL RECORD NUMBER:  [id number]    The patient is being assessed for  Admission    I agree that at least one RN has performed a thorough Head to Toe Skin Assessment on the patient. ALL assessment sites listed below have been assessed. Areas assessed by both nurses:    Head, Face, Ears, Shoulders, Back, Chest, Arms, Elbows, Hands, Sacrum. Buttock, Coccyx, Ischium, and Legs. Feet and Heels        Does the Patient have a Wound?  No noted wound(s)       Salomon Prevention initiated by RN: No  Wound Care Orders initiated by RN: No    Pressure Injury (Stage 3,4, Unstageable, DTI, NWPT, and Complex wounds) if present, place Wound referral order by RN under : No    New Ostomies, if present place, Ostomy referral order under : No     Nurse 1 eSignature: Electronically signed by Tara Scott RN on 12/27/23 at 7:22 PM EST    **SHARE this note so that the co-signing nurse can place an eSignature**    Nurse 2 eSignature: Electronically signed by Jerri Dunbar LPN on 02/32/25 at 4:25 PM EST
Received report from Leeanne Martinez, [address] [place] [address]. Patient alert and oriented. Verified patient's name, , allergies and procedure. No beta blockers, no blood thinners, no implants. H&P on chart.
the Rimma-test method        - biopsies taken of the pre-pyloric antrum, angularis, and body of the stomach           for H pylori assay by histology        - 6 mm, clean-based ulcer in the distal duodenal bulb at 9:00 position Recommendation:        - switch to oral PPI once daily and continue indefinitely        - hold NSAID's if possible        - regular diet        - OK for discharge from GI standpoint Procedure Code(s):        - 26475, Esophagogastroduodenoscopy, flexible, transoral; with biopsy, single           or multiple Diagnosis Code(s):        - K92.1, Melena (includes Hematochezia)        - K22.70, Zimmer's esophagus without dysplasia       CPT(R) - 2022 copyright American [employer] Association. All Rights Reserved. The CPT codes, CCI edits and ICD codes generated are intended as suggestions       and were generated based on input data. These codes are preliminary and upon        review may be revised to meet current compliance and payer requirements. The provider is responsible for the final determination of appropriate codes,       and modifiers. Scope Withdrawal Time:       00:00:00 Jasvir Jacob MD This document has been electronically signed.  Note Initiated:12/28/2023 Note Completed:12/28/2023 7:38 AM      CBC:   Recent Labs     12/27/23  1022 12/27/23 2114 12/28/23  0117   WBC 10.2  --  9.8   HGB 8.2* 7.3* 7.0*     --  340     BMP:    Recent Labs     12/27/23  1022 12/28/23  0117    139   K 4.1 4.6    104   CO2 26 24   BUN 30* 19   CREATININE 1.2 1.1   GLUCOSE 122* 94     Hepatic:   Recent Labs     12/27/23  1022 12/27/23 2114 12/28/23  0117   AST 17 19 19   ALT 12 12 11   BILITOT 0.3 0.2 0.3   ALKPHOS 42 39* 37*     Lipids:   Lab Results   Component Value Date/Time    CHOL 187 10/14/2022 09:40 AM    HDL 56 10/14/2022 09:40 AM    TRIG 158 10/14/2022 09:40 AM     Hemoglobin A1C: No results found for: \"LABA1C\"  TSH:   Lab Results   Component Value

## 2024-01-04 ENCOUNTER — OFFICE VISIT (OUTPATIENT)
Dept: FAMILY MEDICINE CLINIC | Age: 79
End: 2024-01-04

## 2024-01-04 VITALS
HEART RATE: 76 BPM | WEIGHT: 173.4 LBS | DIASTOLIC BLOOD PRESSURE: 70 MMHG | BODY MASS INDEX: 27.99 KG/M2 | SYSTOLIC BLOOD PRESSURE: 148 MMHG | TEMPERATURE: 97.2 F | OXYGEN SATURATION: 97 %

## 2024-01-04 DIAGNOSIS — D68.59 HYPERCOAGULABLE STATE (HCC): ICD-10-CM

## 2024-01-04 DIAGNOSIS — E78.2 MIXED HYPERLIPIDEMIA: ICD-10-CM

## 2024-01-04 DIAGNOSIS — M15.9 PRIMARY OSTEOARTHRITIS INVOLVING MULTIPLE JOINTS: Chronic | ICD-10-CM

## 2024-01-04 DIAGNOSIS — Z79.01 WARFARIN ANTICOAGULATION: ICD-10-CM

## 2024-01-04 DIAGNOSIS — K26.4 GASTROINTESTINAL HEMORRHAGE ASSOCIATED WITH DUODENAL ULCER: ICD-10-CM

## 2024-01-04 DIAGNOSIS — I73.9 PAD (PERIPHERAL ARTERY DISEASE) (HCC): ICD-10-CM

## 2024-01-04 DIAGNOSIS — I25.119 ATHEROSCLEROSIS OF NATIVE CORONARY ARTERY OF NATIVE HEART WITH ANGINA PECTORIS (HCC): ICD-10-CM

## 2024-01-04 DIAGNOSIS — I10 ESSENTIAL HYPERTENSION: ICD-10-CM

## 2024-01-04 DIAGNOSIS — Z09 HOSPITAL DISCHARGE FOLLOW-UP: ICD-10-CM

## 2024-01-04 DIAGNOSIS — K22.719 BARRETT'S ESOPHAGUS WITH DYSPLASIA: Primary | ICD-10-CM

## 2024-01-04 DIAGNOSIS — K92.2 GASTROINTESTINAL HEMORRHAGE, UNSPECIFIED GASTROINTESTINAL HEMORRHAGE TYPE: Chronic | ICD-10-CM

## 2024-01-04 PROBLEM — I20.9 ANGINA PECTORIS, UNSPECIFIED (HCC): Status: RESOLVED | Noted: 2023-10-30 | Resolved: 2024-01-04

## 2024-01-04 PROBLEM — K25.0 ACUTE GASTRIC ULCER WITH HEMORRHAGE: Status: RESOLVED | Noted: 2017-10-16 | Resolved: 2024-01-04

## 2024-01-04 PROBLEM — K92.1 MELENA: Status: RESOLVED | Noted: 2023-12-27 | Resolved: 2024-01-04

## 2024-01-04 LAB
BASOPHILS # BLD: 0.1 K/UL (ref 0–0.2)
BASOPHILS NFR BLD: 0.8 %
DEPRECATED RDW RBC AUTO: 16.5 % (ref 12.4–15.4)
EOSINOPHIL # BLD: 0.4 K/UL (ref 0–0.6)
EOSINOPHIL NFR BLD: 5.2 %
HCT VFR BLD AUTO: 28.3 % (ref 40.5–52.5)
HGB BLD-MCNC: 9.1 G/DL (ref 13.5–17.5)
INTERNATIONAL NORMALIZATION RATIO, POC: 2.3
LYMPHOCYTES # BLD: 1 K/UL (ref 1–5.1)
LYMPHOCYTES NFR BLD: 14.3 %
MCH RBC QN AUTO: 33.1 PG (ref 26–34)
MCHC RBC AUTO-ENTMCNC: 32.2 G/DL (ref 31–36)
MCV RBC AUTO: 102.9 FL (ref 80–100)
MONOCYTES # BLD: 0.6 K/UL (ref 0–1.3)
MONOCYTES NFR BLD: 8.2 %
NEUTROPHILS # BLD: 5.2 K/UL (ref 1.7–7.7)
NEUTROPHILS NFR BLD: 71.5 %
PLATELET # BLD AUTO: 537 K/UL (ref 135–450)
PMV BLD AUTO: 7.6 FL (ref 5–10.5)
PROTHROMBIN TIME, POC: 27.1
RBC # BLD AUTO: 2.75 M/UL (ref 4.2–5.9)
WBC # BLD AUTO: 7.2 K/UL (ref 4–11)

## 2024-01-04 RX ORDER — PRAVASTATIN SODIUM 40 MG
40 TABLET ORAL DAILY
Qty: 90 TABLET | Refills: 1 | Status: SHIPPED | OUTPATIENT
Start: 2024-01-04

## 2024-01-04 RX ORDER — GABAPENTIN 300 MG/1
300 CAPSULE ORAL 2 TIMES DAILY
Qty: 180 CAPSULE | Refills: 1 | Status: SHIPPED | OUTPATIENT
Start: 2024-01-04 | End: 2024-07-02

## 2024-01-04 RX ORDER — FENOFIBRATE 54 MG/1
54 TABLET ORAL EVERY MORNING
Qty: 90 TABLET | Refills: 2 | Status: SHIPPED | OUTPATIENT
Start: 2024-01-04

## 2024-01-04 RX ORDER — PANTOPRAZOLE SODIUM 40 MG/1
40 TABLET, DELAYED RELEASE ORAL
Qty: 90 TABLET | Refills: 1 | Status: SHIPPED | OUTPATIENT
Start: 2024-01-04

## 2024-01-04 RX ORDER — TRAMADOL HYDROCHLORIDE 50 MG/1
50 TABLET ORAL EVERY 8 HOURS PRN
Qty: 270 TABLET | Refills: 1 | Status: SHIPPED | OUTPATIENT
Start: 2024-01-04 | End: 2024-07-02

## 2024-01-04 RX ORDER — AMLODIPINE BESYLATE 5 MG/1
5 TABLET ORAL DAILY
Qty: 90 TABLET | Refills: 1 | Status: SHIPPED | OUTPATIENT
Start: 2024-01-04

## 2024-01-04 RX ORDER — HYDROCHLOROTHIAZIDE 25 MG/1
25 TABLET ORAL EVERY MORNING
Qty: 90 TABLET | Refills: 1 | Status: SHIPPED | OUTPATIENT
Start: 2024-01-04

## 2024-01-04 RX ORDER — NITROGLYCERIN 0.4 MG/1
0.4 TABLET SUBLINGUAL EVERY 5 MIN PRN
Qty: 25 TABLET | Refills: 3 | Status: SHIPPED | OUTPATIENT
Start: 2024-01-04

## 2024-01-04 RX ORDER — METOPROLOL SUCCINATE 100 MG/1
100 TABLET, EXTENDED RELEASE ORAL DAILY
Qty: 90 TABLET | Refills: 1 | Status: SHIPPED | OUTPATIENT
Start: 2024-01-04

## 2024-01-04 ASSESSMENT — ENCOUNTER SYMPTOMS
SHORTNESS OF BREATH: 1
DIARRHEA: 0
COUGH: 0
VOMITING: 0
ABDOMINAL DISTENTION: 0
WHEEZING: 0
ABDOMINAL PAIN: 0
CONSTIPATION: 0

## 2024-01-04 NOTE — ASSESSMENT & PLAN NOTE
Provided new paper prescription so that he can use [facility] to determine what pharmacy will cost in the least.

## 2024-01-04 NOTE — ASSESSMENT & PLAN NOTE
He has had blood clots and has no choice but to keep taking warfarin despite his recent GI bleed.

## 2024-01-04 NOTE — ASSESSMENT & PLAN NOTE
I will send a new prescription for tramadol allowing use up to 2-3 times a day.  He prefers 90 days per prescription in order to save money.

## 2024-01-04 NOTE — PROGRESS NOTES
Coloration: Skin is not jaundiced.   Neurological:      Mental Status: Patient is alert.   Psychiatric:         Behavior: Behavior normal.         Thought Content: Thought content normal.         Judgment: Judgment normal.    POCT INR 2.3 today.    Reviewed PDMP and last tramadol was #12 12/19/23 and last gabapentin 300 up to tid #90 12/14/23.  12/28/2023 surgical path confirms Zimmer's esophagitis    An electronic signature was used to authenticate this note.  --Dariuzs Munoz MD

## 2024-01-04 NOTE — ASSESSMENT & PLAN NOTE
He states his GI doctor insisted that he takes a PPI for life.  I put up prescriptions for the pantoprazole.

## 2024-02-14 ENCOUNTER — TELEPHONE (OUTPATIENT)
Dept: FAMILY MEDICINE CLINIC | Age: 79
End: 2024-02-14
Payer: MEDICARE

## 2024-02-14 DIAGNOSIS — D62 ANEMIA DUE TO ACUTE BLOOD LOSS: Primary | ICD-10-CM

## 2024-02-14 PROCEDURE — 36415 COLL VENOUS BLD VENIPUNCTURE: CPT | Performed by: FAMILY MEDICINE

## 2024-02-14 NOTE — TELEPHONE ENCOUNTER
Patient would like to have his CBC be redrawn since his hemoglobin was low last time.  Can you please order.  Patient coming in tomorrow 2/15/2024 for labs

## 2024-02-14 NOTE — TELEPHONE ENCOUNTER
----- Message from Hilary Sommers sent at 2/14/2024 10:23 AM EST -----  Subject: Message to Provider    QUESTIONS  Information for Provider? Pt is requesting to have orders for labs,   protime and hemoglobin. Would like a call back  ---------------------------------------------------------------------------  --------------  CALL BACK INFO  [phone]; OK to leave message on voicemail  ---------------------------------------------------------------------------  --------------  SCRIPT ANSWERS  Relationship to Patient? Self

## 2024-02-15 ENCOUNTER — NURSE ONLY (OUTPATIENT)
Dept: FAMILY MEDICINE CLINIC | Age: 79
End: 2024-02-15
Payer: MEDICARE

## 2024-02-15 DIAGNOSIS — Z79.01 WARFARIN ANTICOAGULATION: Primary | ICD-10-CM

## 2024-02-15 DIAGNOSIS — K92.2 UPPER GI BLEED: ICD-10-CM

## 2024-02-15 LAB
INTERNATIONAL NORMALIZATION RATIO, POC: 2.8
PROTHROMBIN TIME, POC: 33.2

## 2024-02-15 PROCEDURE — 36415 COLL VENOUS BLD VENIPUNCTURE: CPT | Performed by: FAMILY MEDICINE

## 2024-02-15 PROCEDURE — 85610 PROTHROMBIN TIME: CPT | Performed by: FAMILY MEDICINE

## 2024-02-15 NOTE — PROGRESS NOTES
Patient came in for INR and CBC test. INR reported 2.8 and 33.2 sec. Patient currently tasking 3 MG 5 days a week and 5 MG 2 days a week of warfarin. Venipuncture was then performed on patients left antecubital space using a straight needle to draw one lavender top tube. No complications.

## 2024-02-16 LAB
BASOPHILS # BLD: 0.1 K/UL (ref 0–0.2)
BASOPHILS NFR BLD: 0.8 %
DEPRECATED RDW RBC AUTO: 13.6 % (ref 12.4–15.4)
EOSINOPHIL # BLD: 0.5 K/UL (ref 0–0.6)
EOSINOPHIL NFR BLD: 6.9 %
HCT VFR BLD AUTO: 41.6 % (ref 40.5–52.5)
HGB BLD-MCNC: 13.2 G/DL (ref 13.5–17.5)
LYMPHOCYTES # BLD: 1.3 K/UL (ref 1–5.1)
LYMPHOCYTES NFR BLD: 19.2 %
MCH RBC QN AUTO: 31.9 PG (ref 26–34)
MCHC RBC AUTO-ENTMCNC: 31.8 G/DL (ref 31–36)
MCV RBC AUTO: 100.2 FL (ref 80–100)
MONOCYTES # BLD: 0.5 K/UL (ref 0–1.3)
MONOCYTES NFR BLD: 7.3 %
NEUTROPHILS # BLD: 4.5 K/UL (ref 1.7–7.7)
NEUTROPHILS NFR BLD: 65.8 %
PLATELET # BLD AUTO: 424 K/UL (ref 135–450)
PMV BLD AUTO: 7.9 FL (ref 5–10.5)
RBC # BLD AUTO: 4.15 M/UL (ref 4.2–5.9)
WBC # BLD AUTO: 6.9 K/UL (ref 4–11)

## 2024-04-04 DIAGNOSIS — Z86.718 HX OF FEMORAL ARTERY THROMBOSIS: ICD-10-CM

## 2024-04-04 DIAGNOSIS — Z79.01 WARFARIN ANTICOAGULATION: ICD-10-CM

## 2024-04-04 RX ORDER — WARFARIN SODIUM 4 MG/1
TABLET ORAL
Qty: 30 TABLET | Refills: 0 | Status: SHIPPED | OUTPATIENT
Start: 2024-04-04

## 2024-04-13 DIAGNOSIS — Z86.718 HX OF FEMORAL ARTERY THROMBOSIS: ICD-10-CM

## 2024-04-13 DIAGNOSIS — Z79.01 WARFARIN ANTICOAGULATION: ICD-10-CM

## 2024-04-15 RX ORDER — WARFARIN SODIUM 3 MG/1
TABLET ORAL
Qty: 18 TABLET | Refills: 0 | OUTPATIENT
Start: 2024-04-15

## 2024-04-23 ENCOUNTER — NURSE ONLY (OUTPATIENT)
Dept: FAMILY MEDICINE CLINIC | Age: 79
End: 2024-04-23
Payer: MEDICARE

## 2024-04-23 DIAGNOSIS — Z79.01 WARFARIN ANTICOAGULATION: Primary | ICD-10-CM

## 2024-04-23 DIAGNOSIS — Z79.01 WARFARIN ANTICOAGULATION: ICD-10-CM

## 2024-04-23 DIAGNOSIS — Z86.718 HX OF FEMORAL ARTERY THROMBOSIS: ICD-10-CM

## 2024-04-23 LAB
INTERNATIONAL NORMALIZATION RATIO, POC: 6.5
PROTHROMBIN TIME, POC: 77.5

## 2024-04-23 PROCEDURE — 85610 PROTHROMBIN TIME: CPT | Performed by: FAMILY MEDICINE

## 2024-04-23 RX ORDER — WARFARIN SODIUM 3 MG/1
TABLET ORAL
Qty: 90 TABLET | Refills: 0 | Status: SHIPPED | OUTPATIENT
Start: 2024-04-23

## 2024-04-26 ENCOUNTER — NURSE ONLY (OUTPATIENT)
Dept: FAMILY MEDICINE CLINIC | Age: 79
End: 2024-04-26

## 2024-04-26 DIAGNOSIS — Z79.01 WARFARIN ANTICOAGULATION: Primary | ICD-10-CM

## 2024-04-26 LAB
INTERNATIONAL NORMALIZATION RATIO, POC: 2.2
PROTHROMBIN TIME, POC: 26.2

## 2024-04-26 NOTE — PROGRESS NOTES
Patient came in for INR. Capillary puncture performed on patients left index finger. Patient reports Warfarin 3 mg 6 days a week and 4mg one day a week. INR 2.2 and 26.2 seconds. Patient tolerated well, no complications.

## 2024-05-09 ENCOUNTER — NURSE ONLY (OUTPATIENT)
Dept: FAMILY MEDICINE CLINIC | Age: 79
End: 2024-05-09
Payer: MEDICARE

## 2024-05-09 DIAGNOSIS — Z79.01 WARFARIN ANTICOAGULATION: Primary | ICD-10-CM

## 2024-05-09 LAB
INTERNATIONAL NORMALIZATION RATIO, POC: 3.7
PROTHROMBIN TIME, POC: 44.2

## 2024-05-09 PROCEDURE — 85610 PROTHROMBIN TIME: CPT | Performed by: FAMILY MEDICINE

## 2024-05-20 ENCOUNTER — NURSE ONLY (OUTPATIENT)
Dept: FAMILY MEDICINE CLINIC | Age: 79
End: 2024-05-20
Payer: MEDICARE

## 2024-05-20 DIAGNOSIS — Z79.01 WARFARIN ANTICOAGULATION: Primary | ICD-10-CM

## 2024-05-20 LAB
INTERNATIONAL NORMALIZATION RATIO, POC: 2.9
PROTHROMBIN TIME, POC: 35.4

## 2024-05-20 PROCEDURE — 85610 PROTHROMBIN TIME: CPT | Performed by: FAMILY MEDICINE

## 2024-05-28 NOTE — DISCHARGE SUMMARY
further mitigate pain. Has not returned since last visit on 9/10/21. Will discharge with home program at this time. Goal Status:  [] Achieved [x] Partially Cook Dilling return to further assess   [] Not Achieved    Patient goals : return to full L UE function  Short term goals  Time Frame for Short term goals: Defer to Long Term Goals  Short term goal 1: 5 weeks 6/14/21  Short term goal 2: Pt will demo >75% full PROM with flex/ABD to ease reaching. MET  Short term goal 3: Pt will discharge sling appropriately. Met   Short term goal 4: Pt will report <55% disability per Quick DASH. MET  Long term goals  Time Frame for Long term goals : 10 weeks 8/14/21  Long term goal 1: Pt will demo I with HEP/symptom management. Reports partial compliance   Long term goal 2: Pt will demo AROM within 10 deg of R UE to ease ADLs. Partially MET   Long term goal 3: Pt will demo Good strength in neutral and able to lift 3# overhead to ease overhead reaching. Not MET   Long term goal 4: Pt will report <35% Quick DASH to demo improved function. 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 Partially MET         Patient Status: [] Continue per initial plan of Care     [x] Patient now discharged     [] Additional visits requested, Please re-certify for additional visits: If we are requesting more visits, we fully anticipate the patient's condition is expected to improve within the treatment timeframe we are requesting. Electronically signed by:  Bennett Viveros, PT, DPT, OCS  11/4/2021, 8:51 AM    11/4/2021 8:51 AM     If you have any questions or concerns, please don't hesitate to call.   Thank you for your referral.    Physician Signature:______________________ Date:______ Time: ________  By signing above, therapists plan is approved by physician No

## 2024-07-12 DIAGNOSIS — I10 ESSENTIAL HYPERTENSION: ICD-10-CM

## 2024-07-12 RX ORDER — METOPROLOL SUCCINATE 100 MG/1
100 TABLET, EXTENDED RELEASE ORAL DAILY
Qty: 90 TABLET | Refills: 0 | OUTPATIENT
Start: 2024-07-12

## 2024-07-12 RX ORDER — HYDROCHLOROTHIAZIDE 25 MG/1
25 TABLET ORAL EVERY MORNING
Qty: 90 TABLET | Refills: 0 | OUTPATIENT
Start: 2024-07-12

## 2024-07-12 RX ORDER — AMLODIPINE BESYLATE 5 MG/1
5 TABLET ORAL DAILY
Qty: 90 TABLET | Refills: 0 | OUTPATIENT
Start: 2024-07-12

## 2024-07-14 ASSESSMENT — PATIENT HEALTH QUESTIONNAIRE - PHQ9
SUM OF ALL RESPONSES TO PHQ9 QUESTIONS 1 & 2: 0
SUM OF ALL RESPONSES TO PHQ QUESTIONS 1-9: 0
1. LITTLE INTEREST OR PLEASURE IN DOING THINGS: NOT AT ALL
SUM OF ALL RESPONSES TO PHQ9 QUESTIONS 1 & 2: 0
1. LITTLE INTEREST OR PLEASURE IN DOING THINGS: NOT AT ALL
SUM OF ALL RESPONSES TO PHQ QUESTIONS 1-9: 0
SUM OF ALL RESPONSES TO PHQ QUESTIONS 1-9: 0
2. FEELING DOWN, DEPRESSED OR HOPELESS: NOT AT ALL
SUM OF ALL RESPONSES TO PHQ QUESTIONS 1-9: 0
2. FEELING DOWN, DEPRESSED OR HOPELESS: NOT AT ALL

## 2024-07-17 ENCOUNTER — OFFICE VISIT (OUTPATIENT)
Dept: FAMILY MEDICINE CLINIC | Age: 79
End: 2024-07-17
Payer: MEDICARE

## 2024-07-17 VITALS
WEIGHT: 163.2 LBS | SYSTOLIC BLOOD PRESSURE: 130 MMHG | DIASTOLIC BLOOD PRESSURE: 72 MMHG | OXYGEN SATURATION: 93 % | BODY MASS INDEX: 26.34 KG/M2 | HEART RATE: 74 BPM

## 2024-07-17 DIAGNOSIS — Z86.718 HX OF FEMORAL ARTERY THROMBOSIS: ICD-10-CM

## 2024-07-17 DIAGNOSIS — I25.119 ATHEROSCLEROSIS OF NATIVE CORONARY ARTERY OF NATIVE HEART WITH ANGINA PECTORIS (HCC): ICD-10-CM

## 2024-07-17 DIAGNOSIS — E78.01 HYPERLIPIDEMIA TYPE II: ICD-10-CM

## 2024-07-17 DIAGNOSIS — K22.719 BARRETT'S ESOPHAGUS WITH DYSPLASIA: ICD-10-CM

## 2024-07-17 DIAGNOSIS — Z00.00 MEDICARE ANNUAL WELLNESS VISIT, SUBSEQUENT: Primary | ICD-10-CM

## 2024-07-17 DIAGNOSIS — Z12.5 SCREENING FOR PROSTATE CANCER: ICD-10-CM

## 2024-07-17 DIAGNOSIS — Z79.01 WARFARIN ANTICOAGULATION: ICD-10-CM

## 2024-07-17 DIAGNOSIS — M25.50 ARTHRALGIA, UNSPECIFIED JOINT: ICD-10-CM

## 2024-07-17 DIAGNOSIS — M15.9 PRIMARY OSTEOARTHRITIS INVOLVING MULTIPLE JOINTS: Chronic | ICD-10-CM

## 2024-07-17 DIAGNOSIS — D68.59 HYPERCOAGULABLE STATE (HCC): Chronic | ICD-10-CM

## 2024-07-17 DIAGNOSIS — I10 ESSENTIAL HYPERTENSION: ICD-10-CM

## 2024-07-17 LAB
BASOPHILS # BLD: 0.1 K/UL (ref 0–0.2)
BASOPHILS NFR BLD: 0.8 %
DEPRECATED RDW RBC AUTO: 14 % (ref 12.4–15.4)
EOSINOPHIL # BLD: 0.3 K/UL (ref 0–0.6)
EOSINOPHIL NFR BLD: 3.7 %
ERYTHROCYTE [SEDIMENTATION RATE] IN BLOOD BY WESTERGREN METHOD: 41 MM/HR (ref 0–20)
HCT VFR BLD AUTO: 42.7 % (ref 40.5–52.5)
HGB BLD-MCNC: 13.9 G/DL (ref 13.5–17.5)
INTERNATIONAL NORMALIZATION RATIO, POC: 3.6
LYMPHOCYTES # BLD: 1 K/UL (ref 1–5.1)
LYMPHOCYTES NFR BLD: 14.3 %
MCH RBC QN AUTO: 31.8 PG (ref 26–34)
MCHC RBC AUTO-ENTMCNC: 32.6 G/DL (ref 31–36)
MCV RBC AUTO: 97.6 FL (ref 80–100)
MONOCYTES # BLD: 0.5 K/UL (ref 0–1.3)
MONOCYTES NFR BLD: 7.2 %
NEUTROPHILS # BLD: 5.4 K/UL (ref 1.7–7.7)
NEUTROPHILS NFR BLD: 74 %
PLATELET # BLD AUTO: 453 K/UL (ref 135–450)
PMV BLD AUTO: 8 FL (ref 5–10.5)
PROTHROMBIN TIME, POC: 42.7
RBC # BLD AUTO: 4.38 M/UL (ref 4.2–5.9)
WBC # BLD AUTO: 7.3 K/UL (ref 4–11)

## 2024-07-17 PROCEDURE — 85610 PROTHROMBIN TIME: CPT | Performed by: FAMILY MEDICINE

## 2024-07-17 PROCEDURE — 3078F DIAST BP <80 MM HG: CPT | Performed by: FAMILY MEDICINE

## 2024-07-17 PROCEDURE — 99214 OFFICE O/P EST MOD 30 MIN: CPT | Performed by: FAMILY MEDICINE

## 2024-07-17 PROCEDURE — G0439 PPPS, SUBSEQ VISIT: HCPCS | Performed by: FAMILY MEDICINE

## 2024-07-17 PROCEDURE — 3075F SYST BP GE 130 - 139MM HG: CPT | Performed by: FAMILY MEDICINE

## 2024-07-17 PROCEDURE — 1123F ACP DISCUSS/DSCN MKR DOCD: CPT | Performed by: FAMILY MEDICINE

## 2024-07-17 RX ORDER — NITROGLYCERIN 0.4 MG/1
0.4 TABLET SUBLINGUAL EVERY 5 MIN PRN
Qty: 25 TABLET | Refills: 3 | Status: SHIPPED | OUTPATIENT
Start: 2024-07-17

## 2024-07-17 RX ORDER — PANTOPRAZOLE SODIUM 40 MG/1
40 TABLET, DELAYED RELEASE ORAL
Qty: 90 TABLET | Refills: 2 | Status: SHIPPED | OUTPATIENT
Start: 2024-07-17

## 2024-07-17 RX ORDER — GABAPENTIN 300 MG/1
300 CAPSULE ORAL 2 TIMES DAILY
Qty: 180 CAPSULE | Refills: 1 | Status: SHIPPED | OUTPATIENT
Start: 2024-07-17 | End: 2025-01-13

## 2024-07-17 RX ORDER — PRAVASTATIN SODIUM 40 MG
40 TABLET ORAL DAILY
Qty: 90 TABLET | Refills: 2 | Status: SHIPPED | OUTPATIENT
Start: 2024-07-17

## 2024-07-17 RX ORDER — FENOFIBRATE 54 MG/1
54 TABLET ORAL EVERY MORNING
Qty: 90 TABLET | Refills: 2 | Status: SHIPPED | OUTPATIENT
Start: 2024-07-17

## 2024-07-17 RX ORDER — AMLODIPINE BESYLATE 5 MG/1
5 TABLET ORAL DAILY
Qty: 90 TABLET | Refills: 1 | Status: SHIPPED | OUTPATIENT
Start: 2024-07-17

## 2024-07-17 RX ORDER — WARFARIN SODIUM 3 MG/1
TABLET ORAL DAILY
COMMUNITY

## 2024-07-17 RX ORDER — HYDROCHLOROTHIAZIDE 25 MG/1
25 TABLET ORAL EVERY MORNING
Qty: 90 TABLET | Refills: 1 | Status: SHIPPED | OUTPATIENT
Start: 2024-07-17

## 2024-07-17 RX ORDER — PREDNISONE 5 MG/1
5 TABLET ORAL DAILY
Qty: 30 TABLET | Refills: 0 | Status: SHIPPED | OUTPATIENT
Start: 2024-07-17

## 2024-07-17 RX ORDER — METOPROLOL SUCCINATE 100 MG/1
100 TABLET, EXTENDED RELEASE ORAL DAILY
Qty: 90 TABLET | Refills: 1 | Status: SHIPPED | OUTPATIENT
Start: 2024-07-17

## 2024-07-17 ASSESSMENT — PATIENT HEALTH QUESTIONNAIRE - PHQ9
SUM OF ALL RESPONSES TO PHQ QUESTIONS 1-9: 0
SUM OF ALL RESPONSES TO PHQ9 QUESTIONS 1 & 2: 0
SUM OF ALL RESPONSES TO PHQ QUESTIONS 1-9: 0
2. FEELING DOWN, DEPRESSED OR HOPELESS: NOT AT ALL
1. LITTLE INTEREST OR PLEASURE IN DOING THINGS: NOT AT ALL

## 2024-07-17 ASSESSMENT — VISUAL ACUITY
OD_CC: 20/20-1
OS_CC: 20/25

## 2024-07-17 NOTE — ASSESSMENT & PLAN NOTE
Stable and well-controlled.  Plan to continue same medications of amlodipine 5 mg daily, hydrochlorothiazide 25 mg daily and metoprolol succinate 100 mg daily.

## 2024-07-17 NOTE — ASSESSMENT & PLAN NOTE
Stable.  He occasionally has chest pain about twice a year and nitroglycerin controls it.  His angina is stable.  Encourage exercise.

## 2024-07-17 NOTE — ASSESSMENT & PLAN NOTE
He will continue warfarin.  INR today is 3.6.  Recommended reducing the dose to 3 mg 5 days a week and rechecking INR in about 2 weeks.

## 2024-07-17 NOTE — PROGRESS NOTES
MD Isaiah   aspirin 81 MG EC tablet Take 1 tablet by mouth daily Yes Trace Fortune MD   Cholecalciferol (VITAMIN D3) 2000 units TABS Take 1 tablet by mouth every morning Yes Haider Alaniz MD   Magnesium 400 MG CAPS Take 1 capsule by mouth every morning  Yes Haider Alaniz MD   ascorbic acid (VITAMIN C) 500 MG tablet Take 1 tablet by mouth every morning Yes Haider Alaniz MD   vitamin E 400 UNIT capsule Take 1 capsule by mouth every morning Yes Haider Alaniz MD   Multiple Vitamins-Minerals (CENTRUM SILVER PO) Take 1 tablet by mouth every morning  Yes Haider Alaniz MD       CareTeam (Including outside providers/suppliers regularly involved in providing care):   Patient Care Team:  Dariusz Munoz MD as PCP - General (Family Medicine)  Dariusz Munoz MD as PCP - Empaneled Provider      Reviewed and updated this visit:  Tobacco  Allergies  Meds  Problems  Med Hx  Surg Hx  Soc Hx  Fam Hx                _________________________________________________    Evaluation and Management  7/17/24  Blade Sharma    TYREE COMBS Miki Murguia is a 79 y.o. male who presents today for evaluation of:  Chief Complaint   Patient presents with    6 Month Follow-Up     Pain, INR, HTN     Medicare AWV     Joint pain: He has joint pain with about half an hour of morning stiffness.  The same joints are typically affected but some days some of them are more painful than others.  He was given prednisone by a friend who got it in [place] and discovered that taking 10 mg was helpful for about a day and a half or so but lower amounts was not effective.  So far he is only taking it about once a week because he did not want to take too much of it before he talked to me.  He does not have joint physical swelling.    Hypercoagulability: He has been taking warfarin and his dose has been 3 mg on 6 days a week and 0 on the other day.    Allergies   Allergen Reactions    Azithromycin Diarrhea and

## 2024-07-17 NOTE — ASSESSMENT & PLAN NOTE
He has joint pain helped by prednisone.  He has half an hour of morning stiffness.  I will order tests to look for inflammatory arthritis.  Since it is helpful I will prescribe a limited amount of prednisone.  I will write a 30-day supply of prednisone 5 mg a day but I told him that I would like to see him get it down to 2.5 mg a day if possible.  I discussed that taking prednisone can cause increased risk of cataracts and osteoporosis and elevated blood sugar.

## 2024-07-17 NOTE — ASSESSMENT & PLAN NOTE
INR 3.6 today.  He will decrease the dose to 3 mg 5 days a week and 0 on the other 2 days.  He knows that those 2 days should be spread evenly throughout the week.  Should get a recheck of the INR in about 2 weeks.

## 2024-07-18 LAB
ALBUMIN SERPL-MCNC: 4.4 G/DL (ref 3.4–5)
ALBUMIN/GLOB SERPL: 1.8 {RATIO} (ref 1.1–2.2)
ALP SERPL-CCNC: 62 U/L (ref 40–129)
ALT SERPL-CCNC: 12 U/L (ref 10–40)
ANION GAP SERPL CALCULATED.3IONS-SCNC: 13 MMOL/L (ref 3–16)
AST SERPL-CCNC: 22 U/L (ref 15–37)
BILIRUB SERPL-MCNC: 0.4 MG/DL (ref 0–1)
BUN SERPL-MCNC: 21 MG/DL (ref 7–20)
CALCIUM SERPL-MCNC: 9.9 MG/DL (ref 8.3–10.6)
CCP IGG SERPL-ACNC: 0.7 U/ML (ref 0–2.9)
CHLORIDE SERPL-SCNC: 99 MMOL/L (ref 99–110)
CHOLEST SERPL-MCNC: 208 MG/DL (ref 0–199)
CO2 SERPL-SCNC: 28 MMOL/L (ref 21–32)
CREAT SERPL-MCNC: 1.2 MG/DL (ref 0.8–1.3)
CRP SERPL-MCNC: 10.2 MG/L (ref 0–5.1)
GFR SERPLBLD CREATININE-BSD FMLA CKD-EPI: 61 ML/MIN/{1.73_M2}
GLUCOSE SERPL-MCNC: 110 MG/DL (ref 70–99)
HDLC SERPL-MCNC: 51 MG/DL (ref 40–60)
LDLC SERPL CALC-MCNC: 115 MG/DL
POTASSIUM SERPL-SCNC: 4.5 MMOL/L (ref 3.5–5.1)
PROT SERPL-MCNC: 6.9 G/DL (ref 6.4–8.2)
PSA SERPL DL<=0.01 NG/ML-MCNC: 6.72 NG/ML (ref 0–4)
RHEUMATOID FACT SER IA-ACNC: <10 IU/ML
SODIUM SERPL-SCNC: 140 MMOL/L (ref 136–145)
TRIGL SERPL-MCNC: 209 MG/DL (ref 0–150)
VLDLC SERPL CALC-MCNC: 42 MG/DL

## 2024-07-20 DIAGNOSIS — M15.9 PRIMARY OSTEOARTHRITIS INVOLVING MULTIPLE JOINTS: Chronic | ICD-10-CM

## 2024-07-20 RX ORDER — TRAMADOL HYDROCHLORIDE 50 MG/1
100 TABLET ORAL EVERY 8 HOURS PRN
Qty: 270 TABLET | Refills: 0 | Status: SHIPPED | OUTPATIENT
Start: 2024-07-20 | End: 2024-10-18

## 2024-07-22 ENCOUNTER — PATIENT MESSAGE (OUTPATIENT)
Dept: FAMILY MEDICINE CLINIC | Age: 79
End: 2024-07-22

## 2024-07-22 DIAGNOSIS — E78.01 HYPERLIPIDEMIA TYPE II: ICD-10-CM

## 2024-07-22 DIAGNOSIS — M15.9 PRIMARY OSTEOARTHRITIS INVOLVING MULTIPLE JOINTS: Chronic | ICD-10-CM

## 2024-07-22 RX ORDER — PRAVASTATIN SODIUM 80 MG/1
80 TABLET ORAL DAILY
Qty: 90 TABLET | Refills: 2 | Status: SHIPPED | OUTPATIENT
Start: 2024-07-22

## 2024-07-22 NOTE — TELEPHONE ENCOUNTER
From: Blade Sharma  To: Dr. Dariusz Munoz  Sent: 7/22/2024 4:05 PM EDT  Subject: Stoping Fenofibrate    You recommend that I stop taking Fenofibrate and increase my dosage of Pravastatin.   I agree, but I just refilled my 40mg RX for 90 days of Pravaststin. Is there a dose that I can add to 40mg that I am taking. If so, can you request that pharmacy fill it for me.

## 2024-07-23 DIAGNOSIS — Z86.718 HX OF FEMORAL ARTERY THROMBOSIS: ICD-10-CM

## 2024-07-23 DIAGNOSIS — D68.59 HYPERCOAGULABLE STATE (HCC): Chronic | ICD-10-CM

## 2024-07-23 RX ORDER — WARFARIN SODIUM 3 MG/1
3 TABLET ORAL DAILY
Qty: 30 TABLET | Refills: 2 | Status: SHIPPED | OUTPATIENT
Start: 2024-07-23

## 2024-07-23 RX ORDER — TRAMADOL HYDROCHLORIDE 50 MG/1
100 TABLET ORAL EVERY 8 HOURS PRN
Qty: 270 TABLET | Refills: 0 | OUTPATIENT
Start: 2024-07-23 | End: 2024-10-21

## 2024-07-25 ENCOUNTER — TELEPHONE (OUTPATIENT)
Dept: FAMILY MEDICINE CLINIC | Age: 79
End: 2024-07-25

## 2024-07-25 NOTE — TELEPHONE ENCOUNTER
Patient came in wanting a note saying he has a shoulder replacement so he can go avoid metal detectors.

## 2024-07-27 DIAGNOSIS — Z86.718 HX OF FEMORAL ARTERY THROMBOSIS: ICD-10-CM

## 2024-07-27 DIAGNOSIS — D68.59 HYPERCOAGULABLE STATE (HCC): Chronic | ICD-10-CM

## 2024-07-29 RX ORDER — WARFARIN SODIUM 3 MG/1
TABLET ORAL
Qty: 90 TABLET | Refills: 0 | OUTPATIENT
Start: 2024-07-29

## 2024-08-29 ENCOUNTER — LAB (OUTPATIENT)
Dept: FAMILY MEDICINE CLINIC | Age: 79
End: 2024-08-29
Payer: MEDICARE

## 2024-08-29 DIAGNOSIS — D68.59 HYPERCOAGULABLE STATE (HCC): Primary | ICD-10-CM

## 2024-08-29 DIAGNOSIS — E78.01 HYPERLIPIDEMIA TYPE II: ICD-10-CM

## 2024-08-29 LAB
INTERNATIONAL NORMALIZATION RATIO, POC: 1.4
PROTHROMBIN TIME, POC: 16.8

## 2024-08-29 PROCEDURE — 85610 PROTHROMBIN TIME: CPT | Performed by: FAMILY MEDICINE

## 2024-08-29 RX ORDER — PRAVASTATIN SODIUM 80 MG/1
80 TABLET ORAL DAILY
Qty: 90 TABLET | Refills: 2 | Status: SHIPPED | OUTPATIENT
Start: 2024-08-29

## 2024-09-05 DIAGNOSIS — M25.50 ARTHRALGIA, UNSPECIFIED JOINT: ICD-10-CM

## 2024-09-05 RX ORDER — PREDNISONE 5 MG/1
TABLET ORAL
Qty: 30 TABLET | Refills: 0 | Status: SHIPPED | OUTPATIENT
Start: 2024-09-05

## 2024-10-24 DIAGNOSIS — Z86.718 HX OF FEMORAL ARTERY THROMBOSIS: ICD-10-CM

## 2024-10-24 DIAGNOSIS — D68.59 HYPERCOAGULABLE STATE (HCC): Chronic | ICD-10-CM

## 2024-10-24 RX ORDER — WARFARIN SODIUM 3 MG/1
TABLET ORAL
Qty: 30 TABLET | Refills: 0 | Status: SHIPPED | OUTPATIENT
Start: 2024-10-24

## 2024-10-24 NOTE — TELEPHONE ENCOUNTER
He is overdue for an INR check since we need to verify that INR is back in normal range after 1 that was low in August.

## 2024-10-28 ENCOUNTER — NURSE ONLY (OUTPATIENT)
Dept: FAMILY MEDICINE CLINIC | Age: 79
End: 2024-10-28
Payer: MEDICARE

## 2024-10-28 DIAGNOSIS — D68.59 HYPERCOAGULABLE STATE (HCC): Primary | ICD-10-CM

## 2024-10-28 LAB
INTERNATIONAL NORMALIZATION RATIO, POC: 2.1
PROTHROMBIN TIME, POC: 24.6

## 2024-10-28 PROCEDURE — 85610 PROTHROMBIN TIME: CPT | Performed by: FAMILY MEDICINE

## 2024-11-13 DIAGNOSIS — M25.50 ARTHRALGIA, UNSPECIFIED JOINT: ICD-10-CM

## 2024-11-13 DIAGNOSIS — M15.0 PRIMARY OSTEOARTHRITIS INVOLVING MULTIPLE JOINTS: Chronic | ICD-10-CM

## 2024-11-13 RX ORDER — TRAMADOL HYDROCHLORIDE 50 MG/1
100 TABLET ORAL EVERY 8 HOURS PRN
Qty: 270 TABLET | Refills: 0 | OUTPATIENT
Start: 2024-11-13 | End: 2025-02-11

## 2024-11-13 RX ORDER — PREDNISONE 5 MG/1
TABLET ORAL
Qty: 30 TABLET | Refills: 0 | OUTPATIENT
Start: 2024-11-13

## 2024-11-25 ENCOUNTER — OFFICE VISIT (OUTPATIENT)
Dept: FAMILY MEDICINE CLINIC | Age: 79
End: 2024-11-25

## 2024-11-25 VITALS
WEIGHT: 163.8 LBS | SYSTOLIC BLOOD PRESSURE: 118 MMHG | TEMPERATURE: 97.8 F | HEART RATE: 76 BPM | DIASTOLIC BLOOD PRESSURE: 72 MMHG | BODY MASS INDEX: 26.44 KG/M2 | OXYGEN SATURATION: 95 %

## 2024-11-25 DIAGNOSIS — Z28.21 PNEUMOCOCCAL VACCINATION DECLINED: ICD-10-CM

## 2024-11-25 DIAGNOSIS — K22.719 BARRETT'S ESOPHAGUS WITH DYSPLASIA: ICD-10-CM

## 2024-11-25 DIAGNOSIS — E78.01 HYPERLIPIDEMIA TYPE II: ICD-10-CM

## 2024-11-25 DIAGNOSIS — Z86.718 HX OF FEMORAL ARTERY THROMBOSIS: ICD-10-CM

## 2024-11-25 DIAGNOSIS — M15.0 PRIMARY OSTEOARTHRITIS INVOLVING MULTIPLE JOINTS: Chronic | ICD-10-CM

## 2024-11-25 DIAGNOSIS — Z79.01 WARFARIN ANTICOAGULATION: ICD-10-CM

## 2024-11-25 DIAGNOSIS — I10 ESSENTIAL HYPERTENSION: ICD-10-CM

## 2024-11-25 DIAGNOSIS — R97.20 RAISED PROSTATE SPECIFIC ANTIGEN: ICD-10-CM

## 2024-11-25 DIAGNOSIS — Z00.00 ENCOUNTER FOR WELL ADULT EXAM WITHOUT ABNORMAL FINDINGS: Primary | ICD-10-CM

## 2024-11-25 DIAGNOSIS — N40.0 BENIGN PROSTATIC HYPERPLASIA WITHOUT LOWER URINARY TRACT SYMPTOMS: ICD-10-CM

## 2024-11-25 DIAGNOSIS — M25.50 ARTHRALGIA, UNSPECIFIED JOINT: ICD-10-CM

## 2024-11-25 DIAGNOSIS — I73.9 PAD (PERIPHERAL ARTERY DISEASE) (HCC): ICD-10-CM

## 2024-11-25 DIAGNOSIS — I25.119 ATHEROSCLEROSIS OF NATIVE CORONARY ARTERY OF NATIVE HEART WITH ANGINA PECTORIS (HCC): ICD-10-CM

## 2024-11-25 DIAGNOSIS — D68.59 HYPERCOAGULABLE STATE (HCC): Chronic | ICD-10-CM

## 2024-11-25 LAB
INTERNATIONAL NORMALIZATION RATIO, POC: 2.1
PROTHROMBIN TIME, POC: 25.2

## 2024-11-25 RX ORDER — PREDNISONE 5 MG/1
5 TABLET ORAL DAILY PRN
Qty: 30 TABLET | Refills: 0 | Status: SHIPPED | OUTPATIENT
Start: 2024-11-25

## 2024-11-25 RX ORDER — WARFARIN SODIUM 1 MG/1
TABLET ORAL
Qty: 30 TABLET | Refills: 3 | Status: SHIPPED | OUTPATIENT
Start: 2024-11-25

## 2024-11-25 RX ORDER — TRAMADOL HYDROCHLORIDE 50 MG/1
50 TABLET ORAL EVERY 6 HOURS PRN
Qty: 360 TABLET | Refills: 0 | Status: SHIPPED | OUTPATIENT
Start: 2024-11-25 | End: 2025-02-23

## 2024-11-25 RX ORDER — PRAVASTATIN SODIUM 80 MG/1
80 TABLET ORAL DAILY
Qty: 90 TABLET | Refills: 2 | Status: SHIPPED | OUTPATIENT
Start: 2024-11-25

## 2024-11-25 RX ORDER — AMLODIPINE BESYLATE 5 MG/1
5 TABLET ORAL DAILY
Qty: 90 TABLET | Refills: 1 | Status: SHIPPED | OUTPATIENT
Start: 2024-11-25

## 2024-11-25 RX ORDER — WARFARIN SODIUM 3 MG/1
3 TABLET ORAL DAILY
Qty: 90 TABLET | Refills: 1 | Status: SHIPPED | OUTPATIENT
Start: 2024-11-25

## 2024-11-25 ASSESSMENT — ENCOUNTER SYMPTOMS
SHORTNESS OF BREATH: 0
CONSTIPATION: 0
VOMITING: 0
TROUBLE SWALLOWING: 0
APNEA: 0
COUGH: 0
DIARRHEA: 0
ABDOMINAL PAIN: 0
NAUSEA: 0
BACK PAIN: 1
EYE PAIN: 0

## 2024-11-25 NOTE — ASSESSMENT & PLAN NOTE
He has arthritis is that is helped with occasional sparing use of prednisone, gabapentin, and tramadol.  The tramadol is not completely controlling his pain so we will increase to up to 4 pills a day.

## 2024-11-25 NOTE — PROGRESS NOTES
11/25/24    Blade Sharma  1945  79 y.o. male     Adult Annual Preventive Visit   Chief Complaint   Patient presents with    3 Month Follow-Up    Annual Exam         Risk Assessment:  Activity habits: He does not exercise much.    Eating habits: He eats healthfully.    Sleep habits: He gets good sleep.    Social support: He has some social connection but not as much as in the past.  He is a member of the [employer].    Stressors: He does not have any stress in his life.    Review of Systems   Constitutional:  Negative for fatigue and fever.   HENT:  Negative for nosebleeds and trouble swallowing.    Eyes:  Negative for pain and visual disturbance.   Respiratory:  Negative for apnea, cough and shortness of breath.    Cardiovascular:  Negative for chest pain and leg swelling.   Gastrointestinal:  Negative for abdominal pain, constipation, diarrhea, nausea and vomiting.   Endocrine: Negative for cold intolerance, heat intolerance and polyuria.   Genitourinary:  Negative for difficulty urinating and hematuria.   Musculoskeletal:  Positive for back pain. Negative for arthralgias, gait problem and neck pain.   Skin:  Negative for rash and wound.   Allergic/Immunologic: Negative for environmental allergies, food allergies and immunocompromised state.   Neurological:  Negative for dizziness, seizures, speech difficulty, light-headedness, numbness and headaches.   Hematological:  Negative for adenopathy. Does not bruise/bleed easily.   Psychiatric/Behavioral:  Negative for decreased concentration, dysphoric mood, sleep disturbance and suicidal ideas. The patient is not nervous/anxious.      He is not fasting    Allergies   Allergen Reactions    Azithromycin Diarrhea and Nausea And Vomiting     Diarrhea and coffee ground vomiting.         Current Outpatient Medications   Medication Sig Dispense Refill    traMADol (ULTRAM) 50 MG tablet Take 1 tablet by mouth every 6 hours as needed for Pain for up to 90 days. Intended

## 2024-11-25 NOTE — ASSESSMENT & PLAN NOTE
Hypertension is stable and well-controlled.  Plan to continue amlodipine 5 mg a day, hydrochlorothiazide 25 mg daily, metoprolol succinate 100 mg daily

## 2024-11-25 NOTE — PATIENT INSTRUCTIONS
Well Visit, Over 65: Care Instructions  Well visits can help you stay healthy. Your doctor has checked your overall health and may have suggested ways to take good care of yourself. Your doctor also may have recommended tests. You can help prevent illness with healthy eating, good sleep, vaccinations, regular exercise, and other steps.    Get the tests that you and your doctor decide on. Depending on your age and risks, examples might include hearing tests as well as screening for colon, breast, and lung cancer. Screening helps find diseases before any symptoms appear.   Eat healthy foods. Choose fruits, vegetables, whole grains, lean protein, and low-fat dairy foods. Limit saturated fat, and reduce salt.     Limit alcohol. Men should have no more than 2 drinks a day. Women should have no more than 1. For some people, no alcohol is the best choice.   Exercise. It can help prevent falls. Get at least 30 minutes of exercise on most days of the week. Walking, yoga, and pranav chi can be good choices.     Reach and stay at your healthy weight. This will lower your risk for many health problems.   Take care of your mental health. Try to stay connected with friends, family, and community, and find ways to manage stress.     If you're feeling depressed or hopeless, talk to someone. A counselor can help. If you don't have a counselor, talk to your doctor.   Talk to your doctor if you think you may have a problem with alcohol or drug use. This includes prescription medicines and illegal drugs.     Avoid tobacco and nicotine: Don't smoke, vape, or chew. If you need help quitting, talk to your doctor.   Practice safer sex. Getting tested, using condoms or dental dams, and limiting sex partners can help prevent STIs.     Make an advance directive. This is a legal way to tell your family and doctor what you want to happen at the end of your life or when you can't speak for yourself.   Prevent problems where you can. Protect

## 2024-11-25 NOTE — ASSESSMENT & PLAN NOTE
He gets benefit from occasional use of 5 mg prednisone for joint pains and I feel the benefit is worth the risk.  He understands that long-term use of prednisone can cause problems.

## 2024-11-25 NOTE — ASSESSMENT & PLAN NOTE
Currently has no angina.  He has never had a heart attack.  He has seen a cardiologist in the past simply because his prior PCP thought he should see 1 at the age of 70.  He does not feel he needs to see a cardiologist.  I agree that he does not need to.

## 2024-11-25 NOTE — ASSESSMENT & PLAN NOTE
Stable.  Continue warfarin.  Since he reports having had a horrible experience in the past and being told by his surgeon that he was making clots faster than the surgeon could remove them he prefers to have his INR range in the 2.5-3.5 range.  He has done this for many years.  Therefore since his INR today is 2.1 he will increase to taking 4 mg of warfarin twice a week and continuing with the 3 mg every day that he has been doing.

## 2024-12-23 DIAGNOSIS — I10 ESSENTIAL HYPERTENSION: ICD-10-CM

## 2024-12-26 RX ORDER — HYDROCHLOROTHIAZIDE 25 MG/1
25 TABLET ORAL EVERY MORNING
Qty: 90 TABLET | Refills: 1 | Status: SHIPPED | OUTPATIENT
Start: 2024-12-26

## 2024-12-26 RX ORDER — AMLODIPINE BESYLATE 5 MG/1
5 TABLET ORAL DAILY
Qty: 90 TABLET | Refills: 1 | Status: SHIPPED | OUTPATIENT
Start: 2024-12-26

## 2024-12-26 RX ORDER — METOPROLOL SUCCINATE 100 MG/1
100 TABLET, EXTENDED RELEASE ORAL DAILY
Qty: 90 TABLET | Refills: 1 | Status: SHIPPED | OUTPATIENT
Start: 2024-12-26

## 2024-12-31 DIAGNOSIS — M15.0 PRIMARY OSTEOARTHRITIS INVOLVING MULTIPLE JOINTS: Chronic | ICD-10-CM

## 2024-12-31 RX ORDER — GABAPENTIN 300 MG/1
300 CAPSULE ORAL 2 TIMES DAILY
Qty: 180 CAPSULE | Refills: 0 | Status: SHIPPED | OUTPATIENT
Start: 2024-12-31 | End: 2025-03-31

## 2025-03-03 DIAGNOSIS — M25.50 ARTHRALGIA, UNSPECIFIED JOINT: ICD-10-CM

## 2025-03-03 DIAGNOSIS — M15.0 PRIMARY OSTEOARTHRITIS INVOLVING MULTIPLE JOINTS: Chronic | ICD-10-CM

## 2025-03-03 RX ORDER — PREDNISONE 5 MG/1
TABLET ORAL
Qty: 30 TABLET | Refills: 0 | Status: SHIPPED | OUTPATIENT
Start: 2025-03-03

## 2025-03-03 RX ORDER — TRAMADOL HYDROCHLORIDE 50 MG/1
50 TABLET ORAL EVERY 6 HOURS PRN
Qty: 360 TABLET | Refills: 0 | Status: SHIPPED | OUTPATIENT
Start: 2025-03-03 | End: 2025-06-01

## 2025-03-03 NOTE — TELEPHONE ENCOUNTER
Reviewed PDMP and it shows that the last prescription for tramadol #360 was dispensed on 11/25/2024.

## 2025-03-19 ENCOUNTER — OFFICE VISIT (OUTPATIENT)
Dept: FAMILY MEDICINE CLINIC | Age: 80
End: 2025-03-19
Payer: MEDICARE

## 2025-03-19 VITALS
HEART RATE: 69 BPM | OXYGEN SATURATION: 95 % | WEIGHT: 166 LBS | SYSTOLIC BLOOD PRESSURE: 110 MMHG | BODY MASS INDEX: 26.79 KG/M2 | DIASTOLIC BLOOD PRESSURE: 60 MMHG | TEMPERATURE: 97.3 F

## 2025-03-19 DIAGNOSIS — K22.719 BARRETT'S ESOPHAGUS WITH DYSPLASIA: Chronic | ICD-10-CM

## 2025-03-19 DIAGNOSIS — Z86.718 HX OF FEMORAL ARTERY THROMBOSIS: ICD-10-CM

## 2025-03-19 DIAGNOSIS — Z79.01 WARFARIN ANTICOAGULATION: ICD-10-CM

## 2025-03-19 DIAGNOSIS — M15.0 PRIMARY OSTEOARTHRITIS INVOLVING MULTIPLE JOINTS: Chronic | ICD-10-CM

## 2025-03-19 DIAGNOSIS — M20.42 HAMMERTOE OF LEFT FOOT: ICD-10-CM

## 2025-03-19 DIAGNOSIS — Z87.19 HISTORY OF GI BLEED: ICD-10-CM

## 2025-03-19 DIAGNOSIS — M10.9 ACUTE GOUT INVOLVING TOE OF LEFT FOOT, UNSPECIFIED CAUSE: Primary | ICD-10-CM

## 2025-03-19 DIAGNOSIS — D68.59 HYPERCOAGULABLE STATE: Chronic | ICD-10-CM

## 2025-03-19 LAB
INTERNATIONAL NORMALIZATION RATIO, POC: 1.6
PROTHROMBIN TIME, POC: 19.4

## 2025-03-19 PROCEDURE — 85610 PROTHROMBIN TIME: CPT | Performed by: FAMILY MEDICINE

## 2025-03-19 PROCEDURE — 99214 OFFICE O/P EST MOD 30 MIN: CPT | Performed by: FAMILY MEDICINE

## 2025-03-19 PROCEDURE — 1159F MED LIST DOCD IN RCRD: CPT | Performed by: FAMILY MEDICINE

## 2025-03-19 PROCEDURE — 3074F SYST BP LT 130 MM HG: CPT | Performed by: FAMILY MEDICINE

## 2025-03-19 PROCEDURE — 36415 COLL VENOUS BLD VENIPUNCTURE: CPT | Performed by: FAMILY MEDICINE

## 2025-03-19 PROCEDURE — 3078F DIAST BP <80 MM HG: CPT | Performed by: FAMILY MEDICINE

## 2025-03-19 PROCEDURE — 1123F ACP DISCUSS/DSCN MKR DOCD: CPT | Performed by: FAMILY MEDICINE

## 2025-03-19 RX ORDER — COLCHICINE 0.6 MG/1
0.6 TABLET ORAL DAILY
Qty: 30 TABLET | Refills: 1 | Status: SHIPPED | OUTPATIENT
Start: 2025-03-19

## 2025-03-19 RX ORDER — PANTOPRAZOLE SODIUM 40 MG/1
40 TABLET, DELAYED RELEASE ORAL
Qty: 90 TABLET | Refills: 2 | Status: SHIPPED | OUTPATIENT
Start: 2025-03-19

## 2025-03-19 RX ORDER — TRAMADOL HYDROCHLORIDE 50 MG/1
100 TABLET ORAL EVERY 8 HOURS PRN
Qty: 480 TABLET | Refills: 0 | Status: SHIPPED | OUTPATIENT
Start: 2025-05-02 | End: 2025-07-31

## 2025-03-19 RX ORDER — ASPIRIN 81 MG/1
81 TABLET ORAL DAILY
Qty: 90 TABLET | Refills: 2 | Status: SHIPPED | OUTPATIENT
Start: 2025-03-19

## 2025-03-19 RX ORDER — WARFARIN SODIUM 3 MG/1
3 TABLET ORAL DAILY
Qty: 90 TABLET | Refills: 0 | Status: SHIPPED | OUTPATIENT
Start: 2025-03-19

## 2025-03-19 RX ORDER — GABAPENTIN 300 MG/1
300 CAPSULE ORAL 2 TIMES DAILY
Qty: 180 CAPSULE | Refills: 0 | Status: SHIPPED | OUTPATIENT
Start: 2025-05-05 | End: 2025-08-03

## 2025-03-19 SDOH — ECONOMIC STABILITY: INCOME INSECURITY: IN THE LAST 12 MONTHS, WAS THERE A TIME WHEN YOU WERE NOT ABLE TO PAY THE MORTGAGE OR RENT ON TIME?: NO

## 2025-03-19 SDOH — ECONOMIC STABILITY: FOOD INSECURITY: WITHIN THE PAST 12 MONTHS, THE FOOD YOU BOUGHT JUST DIDN'T LAST AND YOU DIDN'T HAVE MONEY TO GET MORE.: NEVER TRUE

## 2025-03-19 SDOH — ECONOMIC STABILITY: FOOD INSECURITY: WITHIN THE PAST 12 MONTHS, YOU WORRIED THAT YOUR FOOD WOULD RUN OUT BEFORE YOU GOT MONEY TO BUY MORE.: NEVER TRUE

## 2025-03-19 ASSESSMENT — PATIENT HEALTH QUESTIONNAIRE - PHQ9
1. LITTLE INTEREST OR PLEASURE IN DOING THINGS: NOT AT ALL
SUM OF ALL RESPONSES TO PHQ QUESTIONS 1-9: 0
2. FEELING DOWN, DEPRESSED OR HOPELESS: NOT AT ALL
2. FEELING DOWN, DEPRESSED OR HOPELESS: NOT AT ALL
SUM OF ALL RESPONSES TO PHQ QUESTIONS 1-9: 0
1. LITTLE INTEREST OR PLEASURE IN DOING THINGS: NOT AT ALL
SUM OF ALL RESPONSES TO PHQ QUESTIONS 1-9: 0
SUM OF ALL RESPONSES TO PHQ QUESTIONS 1-9: 0
SUM OF ALL RESPONSES TO PHQ9 QUESTIONS 1 & 2: 0

## 2025-03-19 NOTE — ASSESSMENT & PLAN NOTE
I suspect he may actually have gout and that the uric acid may have been low at the time that he had a previous blood tests.  I know it is possible that uric acid levels can be normal during an acute flare of gout.  He did not feel the need to do a uric acid blood test today.  Will treat this with colchicine.  Use of NSAIDs is limited given his history of stomach ulcers and the use of aspirin.

## 2025-03-19 NOTE — ASSESSMENT & PLAN NOTE
With his history of GI bleed and also has history of arterial blood clots it is appropriate for him to continue pantoprazole long-term to protect his stomach.  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

## 2025-03-19 NOTE — ASSESSMENT & PLAN NOTE
Recommended increase warfarin to 4 mg every day. Total of 28 mg/week.  He has a history of arterial clots.

## 2025-03-19 NOTE — ASSESSMENT & PLAN NOTE
Recommended that he monitor the left foot and be sure that the scab on the dorsal toe which is most likely caused by his hammertoes is gradually getting better.  Suggested wearing shoes that do not compress the area.

## 2025-03-19 NOTE — ASSESSMENT & PLAN NOTE
His osteoarthritis pain is not adequately controlled with tramadol 4 times a day so will increase the dose to 6 tablets a day of tramadol.  Also he will continue gabapentin that helps some with the pain.

## 2025-03-19 NOTE — PROGRESS NOTES
3/19/25    Blade Sharma  1945    SUBJECTIVE    HPI - Blade is a 79 y.o. male who presents today for evaluation of:  Chief Complaint   Patient presents with    Joint Pain    Gout     Hx arterial blood clot. Was told to take enough warfarin for INR of 2.5 to 3.5. Recently taking 23 mg / week.     Left foot has pain at the great toe MTPJ.  He states his father had a uric acid problem and had gout.  Once in the past he was told he had pseudogout but the doctor had not done any tests other than a blood test.  The doctor who told him he had pseudogout had done a blood test but had not looked at any joint crystals.    Osteoarthritis: He takes tramadol up to 4 tablets a day but feels it is not adequately controlling his osteoarthritis pain.  He does not feel that it has any opioid effects on him.  He gets some pain relief with the gabapentin as well.      Review of Systems    Allergies   Allergen Reactions    Azithromycin Diarrhea and Nausea And Vomiting     Diarrhea and coffee ground vomiting.         OBJECTIVE    /60 (BP Site: Left Upper Arm, Patient Position: Sitting, BP Cuff Size: Medium Adult)   Pulse 69   Temp 97.3 °F (36.3 °C) (Infrared)   Wt 75.3 kg (166 lb)   SpO2 95%   BMI 26.79 kg/m²     Physical Exam     Constitutional:       General: Not in acute distress.     Appearance: Normal appearance. Not ill-appearing.   Eyes:      General: No scleral icterus.  Cardiovascular:      Rate and Rhythm: Normal rate and regular rhythm.      Heart sounds: No murmur heard.   No friction rub. No gallop.    Pulmonary:      Effort: Pulmonary effort is normal. No respiratory distress.      Breath sounds: No wheezing, rhonchi or rales.   Abdominal:      Palpations: Abdomen is soft. There is no mass.      Tenderness: There is no abdominal tenderness.   Musculoskeletal:     Moves all extremities normally.  Left great toe MTPJ is slightly swollen and erythematous.  Skin:     General: Skin is warm.      Coloration: Skin

## 2025-04-11 ENCOUNTER — RESULTS FOLLOW-UP (OUTPATIENT)
Dept: FAMILY MEDICINE CLINIC | Age: 80
End: 2025-04-11

## 2025-04-11 ENCOUNTER — CLINICAL SUPPORT (OUTPATIENT)
Dept: FAMILY MEDICINE CLINIC | Age: 80
End: 2025-04-11
Payer: MEDICARE

## 2025-04-11 DIAGNOSIS — D68.59 HYPERCOAGULABLE STATE: Primary | Chronic | ICD-10-CM

## 2025-04-11 LAB
INTERNATIONAL NORMALIZATION RATIO, POC: 3
PROTHROMBIN TIME, POC: 36.2

## 2025-04-11 PROCEDURE — 36415 COLL VENOUS BLD VENIPUNCTURE: CPT | Performed by: FAMILY MEDICINE

## 2025-04-11 PROCEDURE — 85610 PROTHROMBIN TIME: CPT | Performed by: FAMILY MEDICINE

## 2025-04-11 NOTE — PROGRESS NOTES
Patient was seen for INR check, patient reports Warfarin 4 mg daily. Capillary puncture performed on patients left index finger. INR 3.0 and 36.2 seconds. Patient tolerated well, no complications.

## 2025-05-03 DIAGNOSIS — M25.50 ARTHRALGIA, UNSPECIFIED JOINT: ICD-10-CM

## 2025-05-05 RX ORDER — PREDNISONE 5 MG/1
TABLET ORAL
Qty: 30 TABLET | Refills: 0 | OUTPATIENT
Start: 2025-05-05

## 2025-05-21 ENCOUNTER — RESULTS FOLLOW-UP (OUTPATIENT)
Dept: FAMILY MEDICINE CLINIC | Age: 80
End: 2025-05-21

## 2025-05-21 ENCOUNTER — OFFICE VISIT (OUTPATIENT)
Dept: FAMILY MEDICINE CLINIC | Age: 80
End: 2025-05-21
Payer: MEDICARE

## 2025-05-21 VITALS
TEMPERATURE: 97.8 F | BODY MASS INDEX: 26.21 KG/M2 | SYSTOLIC BLOOD PRESSURE: 114 MMHG | WEIGHT: 162.4 LBS | DIASTOLIC BLOOD PRESSURE: 64 MMHG | HEART RATE: 70 BPM | OXYGEN SATURATION: 92 %

## 2025-05-21 DIAGNOSIS — I10 ESSENTIAL HYPERTENSION: ICD-10-CM

## 2025-05-21 DIAGNOSIS — Z86.718 HX OF FEMORAL ARTERY THROMBOSIS: ICD-10-CM

## 2025-05-21 DIAGNOSIS — D68.59 HYPERCOAGULABLE STATE: Primary | ICD-10-CM

## 2025-05-21 DIAGNOSIS — M25.50 ARTHRALGIA, UNSPECIFIED JOINT: ICD-10-CM

## 2025-05-21 LAB
INTERNATIONAL NORMALIZATION RATIO, POC: 3.9
PROTHROMBIN TIME, POC: 47.1

## 2025-05-21 PROCEDURE — 85610 PROTHROMBIN TIME: CPT | Performed by: FAMILY MEDICINE

## 2025-05-21 PROCEDURE — 36415 COLL VENOUS BLD VENIPUNCTURE: CPT | Performed by: FAMILY MEDICINE

## 2025-05-21 PROCEDURE — 1123F ACP DISCUSS/DSCN MKR DOCD: CPT | Performed by: FAMILY MEDICINE

## 2025-05-21 PROCEDURE — 99214 OFFICE O/P EST MOD 30 MIN: CPT | Performed by: FAMILY MEDICINE

## 2025-05-21 PROCEDURE — 3078F DIAST BP <80 MM HG: CPT | Performed by: FAMILY MEDICINE

## 2025-05-21 PROCEDURE — 3074F SYST BP LT 130 MM HG: CPT | Performed by: FAMILY MEDICINE

## 2025-05-21 RX ORDER — WARFARIN SODIUM 4 MG/1
4 TABLET ORAL DAILY
Qty: 90 TABLET | Refills: 0 | Status: SHIPPED | OUTPATIENT
Start: 2025-05-21

## 2025-05-21 RX ORDER — HYDROCHLOROTHIAZIDE 25 MG/1
12.5 TABLET ORAL EVERY MORNING
Qty: 90 TABLET | Refills: 1 | Status: SHIPPED | OUTPATIENT
Start: 2025-05-21 | End: 2025-05-21 | Stop reason: SDUPTHER

## 2025-05-21 RX ORDER — HYDROCHLOROTHIAZIDE 25 MG/1
25 TABLET ORAL EVERY MORNING
Qty: 90 TABLET | Refills: 1 | Status: SHIPPED | OUTPATIENT
Start: 2025-05-21

## 2025-05-21 RX ORDER — METOPROLOL SUCCINATE 100 MG/1
100 TABLET, EXTENDED RELEASE ORAL DAILY
Qty: 90 TABLET | Refills: 1 | Status: SHIPPED | OUTPATIENT
Start: 2025-05-21

## 2025-05-21 RX ORDER — WARFARIN SODIUM 3 MG/1
TABLET ORAL
Qty: 30 TABLET | Refills: 0 | Status: SHIPPED | OUTPATIENT
Start: 2025-05-21

## 2025-05-21 RX ORDER — PREDNISONE 5 MG/1
5 TABLET ORAL EVERY OTHER DAY
Qty: 30 TABLET | Refills: 1 | Status: SHIPPED | OUTPATIENT
Start: 2025-05-21

## 2025-05-21 NOTE — ASSESSMENT & PLAN NOTE
Will continue warfarin but reduce the dose to 4 mg most days and 3 mg 1 or 2 days a week.  He will get rechecked in 10 days.

## 2025-05-21 NOTE — PROGRESS NOTES
5/21/25    Blade Sharma  1945    SUBJECTIVE    HPI - Blade is a 79 y.o. male who presents today for evaluation of:  Chief Complaint   Patient presents with    Follow-up     HPI: Anticoagulation he has been taking warfarin 4 mg daily every day.  He is here for an INR check.    Blood pressure: He gets lightheaded when he stands up.  Blood pressure medicines in clued hydrochlorothiazide 25 mg daily and metoprolol 100 mg daily and amlodipine 5 mg daily.      Joint pain: Significantly helped with prednisone that he takes typically every other day.  He has run out of it and had some increased joint pain recently.    Review of Systems    Allergies   Allergen Reactions    Azithromycin Diarrhea and Nausea And Vomiting     Diarrhea and coffee ground vomiting.         OBJECTIVE    /64 (BP Site: Left Upper Arm, Patient Position: Sitting, BP Cuff Size: Medium Adult)   Pulse 70   Temp 97.8 °F (36.6 °C) (Infrared)   Wt 73.7 kg (162 lb 6.4 oz)   SpO2 92%   BMI 26.21 kg/m²     Physical Exam   Constitutional:       General: Not in acute distress.     Appearance: Normal appearance. Not ill-appearing.   Eyes:      General: No scleral icterus.  Cardiovascular:      Rate and Rhythm: Normal rate and regular rhythm.      Heart sounds: No murmur heard.   No friction rub. No gallop.    Pulmonary:      Effort: Pulmonary effort is normal. No respiratory distress.      Breath sounds: No wheezing, rhonchi or rales.   Abdominal:      Palpations: Abdomen is soft. There is no mass.      Tenderness: There is no abdominal tenderness.   Musculoskeletal:     Moves all extremities normally.    Skin:     General: Skin is warm.      Coloration: Skin is not jaundiced.   Neurological:      Mental Status: Patient is alert.   Psychiatric:         Behavior: Behavior normal.         Thought Content: Thought content normal.         Judgment: Judgment normal.        ASSESSMENT/PLAN:    1. Hypercoagulable state  Assessment & Plan:   See note for

## 2025-05-21 NOTE — ASSESSMENT & PLAN NOTE
I believe his blood pressure is getting lower than necessary.  I called him and asked him to stop the amlodipine.  He can continue hydrochlorothiazide 25 mg a day and metoprolol succinate 100 mg a day.  He will monitor his blood pressure more frequently at home.

## 2025-05-21 NOTE — ASSESSMENT & PLAN NOTE
He has joint pains that are significant intermittently helped by taking prednisone 5 mg every other day.  I will put out a prescription for 30 with 1 refill.

## 2025-05-22 LAB
ALBUMIN SERPL-MCNC: 4.2 G/DL (ref 3.4–5)
ALBUMIN/GLOB SERPL: 1.6 {RATIO} (ref 1.1–2.2)
ALP SERPL-CCNC: 70 U/L (ref 40–129)
ALT SERPL-CCNC: 20 U/L (ref 10–40)
ANION GAP SERPL CALCULATED.3IONS-SCNC: 10 MMOL/L (ref 3–16)
AST SERPL-CCNC: 26 U/L (ref 15–37)
BASOPHILS # BLD: 0.1 K/UL (ref 0–0.2)
BASOPHILS NFR BLD: 0.7 %
BILIRUB SERPL-MCNC: 0.4 MG/DL (ref 0–1)
BUN SERPL-MCNC: 23 MG/DL (ref 7–20)
CALCIUM SERPL-MCNC: 10.2 MG/DL (ref 8.3–10.6)
CHLORIDE SERPL-SCNC: 100 MMOL/L (ref 99–110)
CO2 SERPL-SCNC: 31 MMOL/L (ref 21–32)
CREAT SERPL-MCNC: 1.1 MG/DL (ref 0.8–1.3)
DEPRECATED RDW RBC AUTO: 13.5 % (ref 12.4–15.4)
EOSINOPHIL # BLD: 0.3 K/UL (ref 0–0.6)
EOSINOPHIL NFR BLD: 3.3 %
ERYTHROCYTE [SEDIMENTATION RATE] IN BLOOD BY WESTERGREN METHOD: 55 MM/HR (ref 0–20)
GFR SERPLBLD CREATININE-BSD FMLA CKD-EPI: 68 ML/MIN/{1.73_M2}
GLUCOSE SERPL-MCNC: 103 MG/DL (ref 70–99)
HCT VFR BLD AUTO: 45.3 % (ref 40.5–52.5)
HGB BLD-MCNC: 14.9 G/DL (ref 13.5–17.5)
LYMPHOCYTES # BLD: 0.8 K/UL (ref 1–5.1)
LYMPHOCYTES NFR BLD: 9.2 %
MCH RBC QN AUTO: 31.9 PG (ref 26–34)
MCHC RBC AUTO-ENTMCNC: 32.8 G/DL (ref 31–36)
MCV RBC AUTO: 97.1 FL (ref 80–100)
MONOCYTES # BLD: 0.7 K/UL (ref 0–1.3)
MONOCYTES NFR BLD: 7.2 %
NEUTROPHILS # BLD: 7.3 K/UL (ref 1.7–7.7)
NEUTROPHILS NFR BLD: 79.6 %
PLATELET # BLD AUTO: 380 K/UL (ref 135–450)
PMV BLD AUTO: 8.1 FL (ref 5–10.5)
POTASSIUM SERPL-SCNC: 5.3 MMOL/L (ref 3.5–5.1)
PROT SERPL-MCNC: 6.8 G/DL (ref 6.4–8.2)
RBC # BLD AUTO: 4.66 M/UL (ref 4.2–5.9)
SODIUM SERPL-SCNC: 141 MMOL/L (ref 136–145)
WBC # BLD AUTO: 9.2 K/UL (ref 4–11)

## 2025-06-14 DIAGNOSIS — M15.0 PRIMARY OSTEOARTHRITIS INVOLVING MULTIPLE JOINTS: Chronic | ICD-10-CM

## 2025-06-16 RX ORDER — TRAMADOL HYDROCHLORIDE 50 MG/1
TABLET ORAL
Qty: 360 TABLET | Refills: 0 | OUTPATIENT
Start: 2025-06-16

## 2025-06-20 DIAGNOSIS — M15.0 PRIMARY OSTEOARTHRITIS INVOLVING MULTIPLE JOINTS: Chronic | ICD-10-CM

## 2025-06-20 RX ORDER — TRAMADOL HYDROCHLORIDE 50 MG/1
TABLET ORAL
Qty: 360 TABLET | Refills: 0 | OUTPATIENT
Start: 2025-06-20

## 2025-06-26 DIAGNOSIS — D68.59 HYPERCOAGULABLE STATE: ICD-10-CM

## 2025-06-26 DIAGNOSIS — Z86.718 HX OF FEMORAL ARTERY THROMBOSIS: ICD-10-CM

## 2025-06-26 DIAGNOSIS — M15.0 PRIMARY OSTEOARTHRITIS INVOLVING MULTIPLE JOINTS: Chronic | ICD-10-CM

## 2025-06-27 RX ORDER — TRAMADOL HYDROCHLORIDE 50 MG/1
50 TABLET ORAL EVERY 6 HOURS PRN
Qty: 360 TABLET | Refills: 0 | Status: SHIPPED | OUTPATIENT
Start: 2025-06-27 | End: 2025-09-25

## 2025-06-27 RX ORDER — WARFARIN SODIUM 3 MG/1
TABLET ORAL
Qty: 90 TABLET | Refills: 0 | Status: SHIPPED | OUTPATIENT
Start: 2025-06-27

## 2025-06-27 NOTE — TELEPHONE ENCOUNTER
Reviewed PDMP and his last prescription for tramadol #360 was about 3-1/2 months ago.  Prior to that his prior prescription was also about 3-1/2 months before that prescription

## (undated) DEVICE — LINER SUCT CANSTR 1500CC SEMI RIG W/ POR HYDROPHOBIC SHUT

## (undated) DEVICE — Z INACTIVE CONVERTED USE 2418596 CONTAINER SPEC 40ML 10% NEUT BUFF FRMLN CLR POLYPR PREFIL

## (undated) DEVICE — YANKAUER,FLEXIBLE HANDLE,REGLR CAPACITY: Brand: MEDLINE INDUSTRIES, INC.

## (undated) DEVICE — BRUSH CLN DIA5MM NYL SGL END CBL ASST FLEX DSTL TIP POLYPR

## (undated) DEVICE — BW-412T DISP COMBO CLEANING BRUSH: Brand: SINGLE USE COMBINATION CLEANING BRUSH

## (undated) DEVICE — KENDALL 500 SERIES DIAPHORETIC FOAM MONITORING ELECTRODE - TEAR DROP SHAPE ( 30/PK): Brand: KENDALL

## (undated) DEVICE — TUBING, SUCTION, 3/16" X 6', STRAIGHT: Brand: MEDLINE

## (undated) DEVICE — LINE SAMP O2 6.5FT W/FEMALE CONN F/ADULT CAPNOLINE PLUS

## (undated) DEVICE — FORCEPS BX L240CM JAW DIA2.8MM L CAP W/ NDL MIC MESH TOOTH

## (undated) DEVICE — TUBING, SUCTION, 9/32" X 10', STRAIGHT: Brand: MEDLINE

## (undated) DEVICE — JELLY LUBRICATING 3 GM BACTERIOSTATIC

## (undated) DEVICE — ENDOSCOPY KIT: Brand: MEDLINE INDUSTRIES, INC.

## (undated) DEVICE — THE TORRENT IRRIGATION SCOPE CONNECTOR IS USED WITH THE TORRENT IRRIGATION TUBING TO PROVIDE IRRIGATION FLUIDS SUCH AS STERILE WATER DURING GASTROINTESTINAL ENDOSCOPIC PROCEDURES WHEN USED IN CONJUNCTION WITH AN IRRIGATION PUMP (OR ELECTROSURGICAL UNIT).: Brand: TORRENT

## (undated) DEVICE — FIAPC® PROBE W/ FILTER 2200 A OD 2.3MM/6.9FR; L 2.2M/7.2FT: Brand: ERBE

## (undated) DEVICE — Z DISCONTINUED (USE MFG CAT MVABO)  TUBING GAS SAMPLING STD 6.5 FT FEMALE CONN SMRT CAPNOLINE